# Patient Record
Sex: MALE | Race: WHITE | Employment: FULL TIME | URBAN - METROPOLITAN AREA
[De-identification: names, ages, dates, MRNs, and addresses within clinical notes are randomized per-mention and may not be internally consistent; named-entity substitution may affect disease eponyms.]

---

## 2017-07-28 ENCOUNTER — DOCUMENTATION ONLY (OUTPATIENT)
Dept: LAB | Facility: CLINIC | Age: 47
End: 2017-07-28

## 2017-07-28 DIAGNOSIS — I10 HYPERTENSION GOAL BP (BLOOD PRESSURE) < 140/90: ICD-10-CM

## 2017-07-28 DIAGNOSIS — E78.5 HYPERLIPIDEMIA LDL GOAL <130: Primary | ICD-10-CM

## 2017-07-28 NOTE — PROGRESS NOTES
Need previsit labs-see orders and close encounter if nothing else needed./Claudia Ugarte,       Lab results 8/4/17

## 2017-07-28 NOTE — PROGRESS NOTES
Please review, associate diagnosis and sign pending laboratory future orders for patient  upcoming lab appointment on 08/01/17.  Thank you,   Nancy Santos MLT

## 2017-08-01 ENCOUNTER — DOCUMENTATION ONLY (OUTPATIENT)
Dept: LAB | Facility: CLINIC | Age: 47
End: 2017-08-01

## 2017-08-01 DIAGNOSIS — I10 HYPERTENSION GOAL BP (BLOOD PRESSURE) < 140/90: ICD-10-CM

## 2017-08-01 DIAGNOSIS — E78.5 HYPERLIPIDEMIA LDL GOAL <130: ICD-10-CM

## 2017-08-01 DIAGNOSIS — R73.9 HYPERGLYCEMIA: Primary | ICD-10-CM

## 2017-08-01 DIAGNOSIS — R73.9 HYPERGLYCEMIA: ICD-10-CM

## 2017-08-01 LAB
ANION GAP SERPL CALCULATED.3IONS-SCNC: 10 MMOL/L (ref 3–14)
BUN SERPL-MCNC: 18 MG/DL (ref 7–30)
CALCIUM SERPL-MCNC: 9.1 MG/DL (ref 8.5–10.1)
CHLORIDE SERPL-SCNC: 103 MMOL/L (ref 94–109)
CHOLEST SERPL-MCNC: 168 MG/DL
CO2 SERPL-SCNC: 25 MMOL/L (ref 20–32)
CREAT SERPL-MCNC: 0.99 MG/DL (ref 0.66–1.25)
GFR SERPL CREATININE-BSD FRML MDRD: 81 ML/MIN/1.7M2
GLUCOSE SERPL-MCNC: 118 MG/DL (ref 70–99)
HBA1C MFR BLD: 6.4 % (ref 4.3–6)
HDLC SERPL-MCNC: 29 MG/DL
LDLC SERPL CALC-MCNC: 75 MG/DL
NONHDLC SERPL-MCNC: 139 MG/DL
POTASSIUM SERPL-SCNC: 4.2 MMOL/L (ref 3.4–5.3)
SODIUM SERPL-SCNC: 138 MMOL/L (ref 133–144)
TRIGL SERPL-MCNC: 319 MG/DL

## 2017-08-01 PROCEDURE — 83036 HEMOGLOBIN GLYCOSYLATED A1C: CPT | Performed by: FAMILY MEDICINE

## 2017-08-01 PROCEDURE — 80048 BASIC METABOLIC PNL TOTAL CA: CPT | Performed by: FAMILY MEDICINE

## 2017-08-01 PROCEDURE — 36415 COLL VENOUS BLD VENIPUNCTURE: CPT | Performed by: FAMILY MEDICINE

## 2017-08-01 PROCEDURE — 80061 LIPID PANEL: CPT | Performed by: FAMILY MEDICINE

## 2017-08-01 NOTE — PROGRESS NOTES
This patient had his blood drawn today and is requesting a Hgb a1c for his work.  Please review and order laboratory future orders for patient's  lab appointment on 08/01/17.  Thank you,   Nancy Santos MLT

## 2017-08-01 NOTE — PROGRESS NOTES
Please review lab orders sign and close encounter. Cynthia Valdes MA/GREER    Patient is requesting A1C for his work

## 2017-08-03 DIAGNOSIS — I10 ESSENTIAL HYPERTENSION WITH GOAL BLOOD PRESSURE LESS THAN 140/90: ICD-10-CM

## 2017-08-03 DIAGNOSIS — E78.5 HYPERLIPIDEMIA LDL GOAL <130: ICD-10-CM

## 2017-08-03 RX ORDER — AMLODIPINE BESYLATE 10 MG/1
TABLET ORAL
Qty: 90 TABLET | Refills: 0 | Status: SHIPPED | OUTPATIENT
Start: 2017-08-03 | End: 2017-08-04

## 2017-08-03 RX ORDER — SIMVASTATIN 20 MG
TABLET ORAL
Qty: 90 TABLET | Refills: 0 | Status: SHIPPED | OUTPATIENT
Start: 2017-08-03 | End: 2017-08-04

## 2017-08-04 ENCOUNTER — OFFICE VISIT (OUTPATIENT)
Dept: FAMILY MEDICINE | Facility: CLINIC | Age: 47
End: 2017-08-04
Payer: COMMERCIAL

## 2017-08-04 VITALS
WEIGHT: 224 LBS | HEIGHT: 76 IN | DIASTOLIC BLOOD PRESSURE: 78 MMHG | BODY MASS INDEX: 27.28 KG/M2 | HEART RATE: 61 BPM | TEMPERATURE: 97.3 F | SYSTOLIC BLOOD PRESSURE: 138 MMHG | OXYGEN SATURATION: 97 %

## 2017-08-04 DIAGNOSIS — I10 HYPERTENSION GOAL BP (BLOOD PRESSURE) < 140/90: Primary | ICD-10-CM

## 2017-08-04 DIAGNOSIS — E78.5 DYSLIPIDEMIA: ICD-10-CM

## 2017-08-04 PROCEDURE — 99214 OFFICE O/P EST MOD 30 MIN: CPT | Performed by: FAMILY MEDICINE

## 2017-08-04 RX ORDER — SIMVASTATIN 40 MG
40 TABLET ORAL AT BEDTIME
Qty: 90 TABLET | Refills: 3 | Status: SHIPPED | OUTPATIENT
Start: 2017-08-04 | End: 2017-08-10 | Stop reason: DRUGHIGH

## 2017-08-04 RX ORDER — AMLODIPINE BESYLATE 10 MG/1
10 TABLET ORAL DAILY
Qty: 90 TABLET | Refills: 3 | Status: SHIPPED | OUTPATIENT
Start: 2017-08-04 | End: 2018-08-21

## 2017-08-04 NOTE — PROGRESS NOTES
HPI:    I am seeing Teddy Shah for the 1st time. he is a 47 year old male here to discuss:    Work biographic form - I filled this out and gave to him.    HTN - not checking at home. Fairly controlled in clinic.  Evaluation and treatment:   Norvasc 10 mg qd - no side effects   Continue same treatment and advised diet, exercise.    Last Basic Metabolic Panel:  Lab Results   Component Value Date     08/01/2017      Lab Results   Component Value Date    POTASSIUM 4.2 08/01/2017     Lab Results   Component Value Date    CHLORIDE 103 08/01/2017     Lab Results   Component Value Date    ZENOBIA 9.1 08/01/2017     Lab Results   Component Value Date    CO2 25 08/01/2017     Lab Results   Component Value Date    BUN 18 08/01/2017     Lab Results   Component Value Date    CR 0.99 08/01/2017     Lab Results   Component Value Date     08/01/2017     Lab Results   Component Value Date    A1C 6.4 08/01/2017    A1C 6.2 07/22/2016    A1C 6.3 05/13/2016    A1C 5.5 02/11/2013       Dyslipidemia - No history of CAD, CVA, PAD or diabetes. Per ATP4, statin may or may not be recommended since I don't have the 10 year CV risk off of his statin. But he would like to protect himself given FH of MI.    Evaluation and treatment:   Zocor 20 mg qd - some mucle cramps - discussed option to switch to another statin but he wants to wait.    Fish Oil 1 gm qd - has not been taking lately   Co Q 10 qd - not taking lately    The 10-year ASCVD risk score ([place] TAVO Jr, et al., 2013) is: 5.3%    Values used to calculate the score:      Age: 47 years      Sex: Male      Is Non- : No      Diabetic: No      Tobacco smoker: No      Systolic Blood Pressure: 138 mmHg      Is BP treated: Yes      HDL Cholesterol: 29 mg/dL      Total Cholesterol: 168 mg/dL       Recent Labs   Lab Test  08/01/17   0955  07/22/16   0830  05/12/15  03/12/15   0820   CHOL  168  164   < >  181  147   HDL  29*  30*   < >  35  31*   LDL  75  83   < >  " 94  58   TRIG  319*  253*   < >  260  288*   CHOLHDLRATIO   --    --    --   5.2  4.7    < > = values in this interval not displayed.       GERD -  Evaluation and treatment:   Prilosec 40 mg qd - not needing it lately.    Overweight, pre-diabetes - diet and exercise discussed.    Body mass index is 27.27 kg/(m^2).     Wt Readings from Last 5 Encounters:   08/04/17 224 lb (101.6 kg)   07/28/16 224 lb (101.6 kg)   05/23/16 220 lb (99.8 kg)   03/12/15 237 lb (107.5 kg)   12/23/14 230 lb (104.3 kg)       Lab Results   Component Value Date     08/01/2017     Lab Results   Component Value Date    A1C 6.4 08/01/2017    A1C 6.2 07/22/2016    A1C 6.3 05/13/2016    A1C 5.5 02/11/2013       Preventive -     Immunization History   Administered Date(s) Administered     Influenza Intranasal Vaccine 4 valent 12/12/2014     TDAP Vaccine (Adacel) 06/30/2010       ROS:    Const: No fevers, weight changes or night sweats recently.  ENT: No runny nose, sore throat or ear pain.  Resp: No cough or shortness of breath.  CV: No chest pain, dizziness or cardiac palpitations.  GI: No nausea, vomiting, diarrhea or constipation. Denies blood in stools or black stools.  : No dysuria, frequency or hematuria.  The rest of the ROS negative, other than listed on HPI    SH:    Marital status:   Kids: 3  Employment: sales  Exercise: hockey once per week, motorcycling  Tobacco: no  Etoh: occ  Recreational drugs: no  Caffeine: monster daily    FH:    Dad passed away age 47 due to MI    Exam:    /78  Pulse 61  Temp 97.3  F (36.3  C) (Oral)  Ht 6' 4\" (1.93 m)  Wt 224 lb (101.6 kg)  SpO2 97%  BMI 27.27 kg/m2    Gen: Healthy appearing male in no acute distress  Eyes: Conjunctiva and sclera normal. Pupils react normally to light. No nystagmus.  Neck: No enlarged lymph nodes, thyromegally or other masses.  Lungs: Good air movement and otherwise clear.  CV: Heart RRR with no murmurs. No JVD, carotid bruits or leg " edema.      Assessment and Plan - Decision Making    1. Hypertension goal BP (blood pressure) < 140/90  Per HPI  - amLODIPine (NORVASC) 10 MG tablet; Take 1 tablet (10 mg) by mouth daily  Dispense: 90 tablet; Refill: 3    2. Dyslipidemia  Per HPI  - simvastatin (ZOCOR) 40 MG tablet; Take 1 tablet (40 mg) by mouth At Bedtime  Dispense: 90 tablet; Refill: 3      Written instructions given as follows:    Patient Instructions   1. Let's shoot for waist circ of 37 inches.    2. I recommend including veggies, fresh fruits (3 to 5 servings), nuts (unsalted) in your daily diet. Cut down on carbs like bread, pasta, rice, potatoes. Cut down on red meats like burgers etc. Increase healthy proteins like beans, tofu, tuna, chicken and turkey.    3. Get regular exercise like jogging, biking, swimming at least 3 times per week. Also do some weights.    4. Come back in 6 months fasting. If you can't, at least make a lab only appointment.

## 2017-08-04 NOTE — MR AVS SNAPSHOT
After Visit Summary   8/4/2017    Teddy Shah    MRN: [id number]           Patient Information     Date Of Birth          1970        Visit Information        Provider Department      8/4/2017 8:10 AM Levi Oliver MD [facility]        Today's Diagnoses     Hypertension goal BP (blood pressure) < 140/90    -  1    Dyslipidemia          Care Instructions    1. Let's shoot for waist circ of 37 inches.    2. I recommend including veggies, fresh fruits (3 to 5 servings), nuts (unsalted) in your daily diet. Cut down on carbs like bread, pasta, rice, potatoes. Cut down on red meats like burgers etc. Increase healthy proteins like beans, tofu, tuna, chicken and turkey.    3. Get regular exercise like jogging, biking, swimming at least 3 times per week. Also do some weights.    4. Come back in 6 months fasting. If you can't, at least make a lab only appointment.                      Follow-ups after your visit        Who to contact     If you have questions or need follow up information about today's clinic visit or your schedule please contact [facility] directly at [phone].  Normal or non-critical lab and imaging results will be communicated to you by Scondoohart, letter or phone within 4 business days after the clinic has received the results. If you do not hear from us within 7 days, please contact the clinic through Scondoohart or phone. If you have a critical or abnormal lab result, we will notify you by phone as soon as possible.  Submit refill requests through [employer] or call your pharmacy and they will forward the refill request to us. Please allow 3 business days for your refill to be completed.          Additional Information About Your Visit        MyChart Information     [employer] gives you secure access to your electronic health record. If you see a primary care provider, you can also send messages to your care team and make appointments. If you have questions,  "please call your primary care clinic.  If you do not have a primary care provider, please call [phone] and they will assist you.        Care EveryWhere ID     This is your Care EveryWhere ID. This could be used by other organizations to access your [place] medical records  [id number]        Your Vitals Were     Pulse Temperature Height Pulse Oximetry BMI (Body Mass Index)       61 97.3  F (36.3  C) (Oral) 6' 4\" (1.93 m) 97% 27.27 kg/m2        Blood Pressure from Last 3 Encounters:   08/04/17 138/78   07/28/16 124/66   05/26/16 113/65    Weight from Last 3 Encounters:   08/04/17 224 lb (101.6 kg)   07/28/16 224 lb (101.6 kg)   05/23/16 220 lb (99.8 kg)              Today, you had the following     No orders found for display         Today's Medication Changes          These changes are accurate as of: 8/4/17  8:43 AM.  If you have any questions, ask your nurse or doctor.               Start taking these medicines.        Dose/Directions    amLODIPine 10 MG tablet   Commonly known as:  NORVASC   Used for:  Hypertension goal BP (blood pressure) < 140/90   Started by:  Levi Oliver MD        Dose:  10 mg   Take 1 tablet (10 mg) by mouth daily   Quantity:  90 tablet   Refills:  3         These medicines have changed or have updated prescriptions.        Dose/Directions    simvastatin 40 MG tablet   Commonly known as:  ZOCOR   This may have changed:  See the new instructions.   Used for:  Dyslipidemia   Changed by:  Levi Oliver MD        Dose:  40 mg   Take 1 tablet (40 mg) by mouth At Bedtime   Quantity:  90 tablet   Refills:  3            Where to get your medicines      These medications were sent to [facility] - [facility], [address]  [address] [facility] [address]     Phone:  [phone]     amLODIPine 10 MG tablet    simvastatin 40 MG tablet                Primary Care Provider Office Phone # Fax #    Hasmukh Vick [phone]       " [facility] [address] [facility] [address]        Equal Access to Services     OLGA COLEMAN : Hadii aad ku hadamiralayo Soclaudiaali, waaxda luqadaha, qaybta kaalmada yarelicristianokevin, adry willson renebasil salinasjadeheather bonds. So [facility] [phone].    ATENCIÓN: Si habla español, tiene a booth disposición servicios gratuitos de asistencia lingüística. Llame al [phone].    We comply with applicable federal civil rights laws and [place] laws. We do not discriminate on the basis of race, color, national origin, age, disability sex, sexual orientation or gender identity.            Thank you!     Thank you for choosing [facility]  for your care. Our goal is always to provide you with excellent care. Hearing back from our patients is one way we can continue to improve our services. Please take a few minutes to complete the written survey that you may receive in the mail after your visit with us. Thank you!             Your Updated Medication List - Protect others around you: Learn how to safely use, store and throw away your medicines at www.disposemymeds.org.          This list is accurate as of: 8/4/17  8:43 AM.  Always use your most recent med list.                   Brand Name Dispense Instructions for use Diagnosis    amLODIPine 10 MG tablet    NORVASC    90 tablet    Take 1 tablet (10 mg) by mouth daily    Hypertension goal BP (blood pressure) < 140/90       COQ-10 PO           MAGNESIUM OXIDE PO      Take 200 mg by mouth daily        OMEGA-3 FISH OIL PO      Take 1 g by mouth daily        omeprazole 40 MG capsule    priLOSEC    90 capsule    Take 1 capsule (40 mg) by mouth daily Take 30-60 minutes before a meal. x14 days and than as needed for heartburn    GERD (gastroesophageal reflux disease)       simvastatin 40 MG tablet    ZOCOR    90 tablet    Take 1 tablet (40 mg) by mouth At Bedtime    Dyslipidemia       VITAMIN D (CHOLECALCIFEROL) PO      Take 1,000 Units by mouth daily

## 2017-08-04 NOTE — NURSING NOTE
"Chief Complaint   Patient presents with     Hypertension     Results     labs 8-1-2017       Initial /78 (Cuff Size: Adult Regular)  Pulse 61  Temp 97.3  F (36.3  C) (Oral)  Ht 6' 4\" (1.93 m)  Wt 224 lb (101.6 kg)  SpO2 97%  BMI 27.27 kg/m2 Estimated body mass index is 27.27 kg/(m^2) as calculated from the following:    Height as of this encounter: 6' 4\" (1.93 m).    Weight as of this encounter: 224 lb (101.6 kg).  Medication Reconciliation: complete    Maribel Cardozo LPN    "

## 2017-08-04 NOTE — PATIENT INSTRUCTIONS
1. Let's shoot for waist circ of 37 inches.    2. I recommend including veggies, fresh fruits (3 to 5 servings), nuts (unsalted) in your daily diet. Cut down on carbs like bread, pasta, rice, potatoes. Cut down on red meats like burgers etc. Increase healthy proteins like beans, tofu, tuna, chicken and turkey.    3. Get regular exercise like jogging, biking, swimming at least 3 times per week. Also do some weights.    4. Come back in 6 months fasting. If you can't, at least make a lab only appointment.

## 2017-08-10 ENCOUNTER — TELEPHONE (OUTPATIENT)
Dept: FAMILY MEDICINE | Facility: CLINIC | Age: 47
End: 2017-08-10

## 2017-08-10 DIAGNOSIS — E78.5 HYPERLIPIDEMIA LDL GOAL <130: ICD-10-CM

## 2017-08-10 DIAGNOSIS — E78.5 DYSLIPIDEMIA: ICD-10-CM

## 2017-08-10 RX ORDER — SIMVASTATIN 20 MG
20 TABLET ORAL AT BEDTIME
Qty: 90 TABLET | Refills: 3 | Status: SHIPPED | OUTPATIENT
Start: 2017-08-10 | End: 2018-08-21

## 2017-08-10 NOTE — TELEPHONE ENCOUNTER
[facility] calling regarding medication order for Simvastatin 40mg. Patient is already on Amlodipine. When taking Amlodipine, Simvastatin should not exceed 20mg daily. Pharmacist said they have sent this same notice a few times already by fax, so if they don't hear back in 24 hours they may need to cancel the order. Please call to advise. Thank you.

## 2018-01-09 ENCOUNTER — ALLIED HEALTH/NURSE VISIT (OUTPATIENT)
Dept: NURSING | Facility: CLINIC | Age: 48
End: 2018-01-09
Payer: COMMERCIAL

## 2018-01-09 DIAGNOSIS — Z23 NEED FOR PROPHYLACTIC VACCINATION AND INOCULATION AGAINST INFLUENZA: Primary | ICD-10-CM

## 2018-01-09 PROCEDURE — 90471 IMMUNIZATION ADMIN: CPT

## 2018-01-09 PROCEDURE — 99207 ZZC NO CHARGE NURSE ONLY: CPT

## 2018-01-09 PROCEDURE — 90686 IIV4 VACC NO PRSV 0.5 ML IM: CPT

## 2018-01-09 NOTE — MR AVS SNAPSHOT
After Visit Summary   1/9/2018    Teddy Shah    MRN: [id number]           Patient Information     Date Of Birth          1970        Visit Information        Provider Department      1/9/2018 3:30 PM AN ANCILLARY [facility]        Today's Diagnoses     Need for prophylactic vaccination and inoculation against influenza    -  1       Follow-ups after your visit        Who to contact     If you have questions or need follow up information about today's clinic visit or your schedule please contact [facility] directly at [phone].  Normal or non-critical lab and imaging results will be communicated to you by Naymithart, letter or phone within 4 business days after the clinic has received the results. If you do not hear from us within 7 days, please contact the clinic through Goingt or phone. If you have a critical or abnormal lab result, we will notify you by phone as soon as possible.  Submit refill requests through [employer] or call your pharmacy and they will forward the refill request to us. Please allow 3 business days for your refill to be completed.          Additional Information About Your Visit        MyChart Information     [employer] gives you secure access to your electronic health record. If you see a primary care provider, you can also send messages to your care team and make appointments. If you have questions, please call your primary care clinic.  If you do not have a primary care provider, please call [phone] and they will assist you.        Care EveryWhere ID     This is your Care EveryWhere ID. This could be used by other organizations to access your [place] medical records  [id number]         Blood Pressure from Last 3 Encounters:   08/04/17 138/78   07/28/16 124/66   05/26/16 113/65    Weight from Last 3 Encounters:   08/04/17 224 lb (101.6 kg)   07/28/16 224 lb (101.6 kg)   05/23/16 220 lb (99.8 kg)              We Performed the Following      FLU VAC, SPLIT VIRUS IM > 3 YO (QUADRIVALENT) [92604]     Vaccine Administration, Initial [52133]        Primary Care Provider Office Phone # Fax #    Hasmukh Vick [phone] [address] [facility] [address]        Equal Access to Services     OLGA COLEMAN : Hadii aad ku hadasho Soomaali, waaxda luqadaha, qaybta kaalmada adeegyada, waxay annain renen yareli salinasjadeheather bonds. So [facility] [phone].    ATENCIÓN: Si habla español, tiene a booth disposición servicios gratuitos de asistencia lingüística. [facility] [phone].    We comply with applicable federal civil rights laws and [place] laws. We do not discriminate on the basis of race, color, national origin, age, disability, sex, sexual orientation, or gender identity.            Thank you!     Thank you for choosing [facility]  for your care. Our goal is always to provide you with excellent care. Hearing back from our patients is one way we can continue to improve our services. Please take a few minutes to complete the written survey that you may receive in the mail after your visit with us. Thank you!             Your Updated Medication List - Protect others around you: Learn how to safely use, store and throw away your medicines at www.disposemymeds.org.          This list is accurate as of: 1/9/18  3:45 PM.  Always use your most recent med list.                   Brand Name Dispense Instructions for use Diagnosis    amLODIPine 10 MG tablet    NORVASC    90 tablet    Take 1 tablet (10 mg) by mouth daily    Hypertension goal BP (blood pressure) < 140/90       COQ-10 PO           MAGNESIUM OXIDE PO      Take 200 mg by mouth daily        OMEGA-3 FISH OIL PO      Take 1 g by mouth daily        omeprazole 40 MG capsule    priLOSEC    90 capsule    Take 1 capsule (40 mg) by mouth daily Take 30-60 minutes before a meal. x14 days and than as needed for heartburn    GERD (gastroesophageal reflux disease)        simvastatin 20 MG tablet    ZOCOR    90 tablet    Take 1 tablet (20 mg) by mouth At Bedtime    Hyperlipidemia LDL goal <130       VITAMIN D (CHOLECALCIFEROL) PO      Take 1,000 Units by mouth daily

## 2018-01-09 NOTE — PROGRESS NOTES

## 2018-02-11 ENCOUNTER — MEDICAL CORRESPONDENCE (OUTPATIENT)
Dept: HEALTH INFORMATION MANAGEMENT | Facility: CLINIC | Age: 48
End: 2018-02-11

## 2018-02-15 ENCOUNTER — TELEPHONE (OUTPATIENT)
Dept: FAMILY MEDICINE | Facility: CLINIC | Age: 48
End: 2018-02-15

## 2018-02-15 NOTE — TELEPHONE ENCOUNTER
Reason for Call:  Form, our goal is to have forms completed with 72 hours, however, some forms may require a visit or additional information.    Type of letter, form or note:  Letter of Medical necessity    Who is the form from?: Patient    Where did the form come from: Patient or family brought in       What clinic location was the form placed at?: [place]    Where the form was placed: 's Box    What number is listed as a contact on the form?: [phone]       Additional comments: Please Fax to [phone]    Call taken on 2/15/2018 at 1:42 PM by Katelyn Fernandez

## 2018-02-16 NOTE — TELEPHONE ENCOUNTER
Dr Vick filled out form. Faxed to [employer]@ [phone]. Cynthia Valdes MA/GREER

## 2018-04-01 ENCOUNTER — TRANSFERRED RECORDS (OUTPATIENT)
Dept: HEALTH INFORMATION MANAGEMENT | Facility: CLINIC | Age: 48
End: 2018-04-01

## 2018-04-24 ENCOUNTER — MYC MEDICAL ADVICE (OUTPATIENT)
Dept: FAMILY MEDICINE | Facility: CLINIC | Age: 48
End: 2018-04-24

## 2018-04-24 DIAGNOSIS — J30.2 SEASONAL ALLERGIC RHINITIS, UNSPECIFIED CHRONICITY, UNSPECIFIED TRIGGER: Primary | ICD-10-CM

## 2018-04-24 RX ORDER — FEXOFENADINE HCL 180 MG/1
180 TABLET ORAL DAILY
Qty: 90 TABLET | Refills: 0 | Status: SHIPPED | OUTPATIENT
Start: 2018-04-24 | End: 2018-08-21

## 2018-04-24 NOTE — TELEPHONE ENCOUNTER
Please call patient - overdue for md appointment/labs. Please help set-up in next month. Prescription allegra to x-press prescriptions. Hasmukh Vick MD

## 2018-04-25 ENCOUNTER — DOCUMENTATION ONLY (OUTPATIENT)
Dept: LAB | Facility: CLINIC | Age: 48
End: 2018-04-25

## 2018-04-25 DIAGNOSIS — E78.5 HYPERLIPIDEMIA LDL GOAL <130: Primary | ICD-10-CM

## 2018-04-25 DIAGNOSIS — I10 HYPERTENSION GOAL BP (BLOOD PRESSURE) < 140/90: ICD-10-CM

## 2018-04-25 DIAGNOSIS — R73.9 HYPERGLYCEMIA: ICD-10-CM

## 2018-04-25 DIAGNOSIS — Z12.5 SPECIAL SCREENING FOR MALIGNANT NEOPLASM OF PROSTATE: ICD-10-CM

## 2018-04-25 NOTE — PROGRESS NOTES
Please review, associate diagnosis and sign pending laboratory future orders for patient's  upcoming lab appointment on 04/27/18.    Thank you,   Nancy Santos MLT

## 2018-04-27 DIAGNOSIS — I10 HYPERTENSION GOAL BP (BLOOD PRESSURE) < 140/90: ICD-10-CM

## 2018-04-27 DIAGNOSIS — Z12.5 SPECIAL SCREENING FOR MALIGNANT NEOPLASM OF PROSTATE: ICD-10-CM

## 2018-04-27 DIAGNOSIS — E78.5 HYPERLIPIDEMIA LDL GOAL <130: ICD-10-CM

## 2018-04-27 DIAGNOSIS — R73.9 HYPERGLYCEMIA: ICD-10-CM

## 2018-04-27 LAB
ALBUMIN SERPL-MCNC: 4.3 G/DL (ref 3.4–5)
ANION GAP SERPL CALCULATED.3IONS-SCNC: 5 MMOL/L (ref 3–14)
BUN SERPL-MCNC: 14 MG/DL (ref 7–30)
CALCIUM SERPL-MCNC: 9.2 MG/DL (ref 8.5–10.1)
CHLORIDE SERPL-SCNC: 104 MMOL/L (ref 94–109)
CHOLEST SERPL-MCNC: 152 MG/DL
CO2 SERPL-SCNC: 29 MMOL/L (ref 20–32)
CREAT SERPL-MCNC: 0.94 MG/DL (ref 0.66–1.25)
GFR SERPL CREATININE-BSD FRML MDRD: 85 ML/MIN/1.7M2
GLUCOSE SERPL-MCNC: 131 MG/DL (ref 70–99)
HBA1C MFR BLD: 6.6 % (ref 0–5.6)
HDLC SERPL-MCNC: 31 MG/DL
LDLC SERPL CALC-MCNC: 57 MG/DL
NONHDLC SERPL-MCNC: 121 MG/DL
PHOSPHATE SERPL-MCNC: 2.4 MG/DL (ref 2.5–4.5)
POTASSIUM SERPL-SCNC: 4.6 MMOL/L (ref 3.4–5.3)
PSA SERPL-ACNC: 1.75 UG/L (ref 0–4)
SODIUM SERPL-SCNC: 138 MMOL/L (ref 133–144)
TRIGL SERPL-MCNC: 322 MG/DL

## 2018-04-27 PROCEDURE — G0103 PSA SCREENING: HCPCS | Performed by: FAMILY MEDICINE

## 2018-04-27 PROCEDURE — 80069 RENAL FUNCTION PANEL: CPT | Performed by: FAMILY MEDICINE

## 2018-04-27 PROCEDURE — 83036 HEMOGLOBIN GLYCOSYLATED A1C: CPT | Performed by: FAMILY MEDICINE

## 2018-04-27 PROCEDURE — 80061 LIPID PANEL: CPT | Performed by: FAMILY MEDICINE

## 2018-04-27 PROCEDURE — 36415 COLL VENOUS BLD VENIPUNCTURE: CPT | Performed by: FAMILY MEDICINE

## 2018-04-30 ENCOUNTER — OFFICE VISIT (OUTPATIENT)
Dept: FAMILY MEDICINE | Facility: CLINIC | Age: 48
End: 2018-04-30
Payer: COMMERCIAL

## 2018-04-30 VITALS
HEART RATE: 67 BPM | BODY MASS INDEX: 27.4 KG/M2 | RESPIRATION RATE: 16 BRPM | OXYGEN SATURATION: 100 % | DIASTOLIC BLOOD PRESSURE: 86 MMHG | SYSTOLIC BLOOD PRESSURE: 138 MMHG | WEIGHT: 225 LBS | TEMPERATURE: 96.6 F | HEIGHT: 76 IN

## 2018-04-30 DIAGNOSIS — I10 HYPERTENSION GOAL BP (BLOOD PRESSURE) < 140/90: ICD-10-CM

## 2018-04-30 DIAGNOSIS — R73.9 HYPERGLYCEMIA: Primary | ICD-10-CM

## 2018-04-30 DIAGNOSIS — E78.5 HYPERLIPIDEMIA LDL GOAL <130: ICD-10-CM

## 2018-04-30 PROCEDURE — 99214 OFFICE O/P EST MOD 30 MIN: CPT | Performed by: FAMILY MEDICINE

## 2018-04-30 RX ORDER — METFORMIN HCL 500 MG
500 TABLET, EXTENDED RELEASE 24 HR ORAL
Qty: 30 TABLET | Refills: 5 | Status: SHIPPED | OUTPATIENT
Start: 2018-04-30 | End: 2018-07-03

## 2018-04-30 NOTE — PROGRESS NOTES
SUBJECTIVE:  Teddy Shah, a 47 year old male scheduled an appointment to discuss the following issues:  Follow-up htn, high cholesterol and hyperglycemia.   No metformin. Exercise more. No family history dm. Diet pop.   Limiting bread. Hockey/biking. No chest pain or shortness of breath. No nausea, vomiting or diarrhea or black or bloody stools.   No outside blood pressure reading. No urine changes or std concerns.   Emotionally doing.   Will travel more for job. 19 yo son doing ok.    but marriage ok.     Past Medical History:   Diagnosis Date     Arthritis      Coronary artery disease     Father  of heart attack at age 47     HTN (hypertension) 2010     Hypercholesteremia        Past Surgical History:   Procedure Laterality Date     C SHOULDER SURG PROC UNLISTED       COLONOSCOPY WITH CO2 INSUFFLATION N/A 2016    Procedure: COLONOSCOPY WITH CO2 INSUFFLATION;  Surgeon: Gerber Fitzpatrick MD;  Location: [facility] OR     ESOPHAGOSCOPY, GASTROSCOPY, DUODENOSCOPY (EGD), COMBINED  7/15/2014    Procedure: COMBINED ESOPHAGOSCOPY, GASTROSCOPY, DUODENOSCOPY (EGD), BIOPSY SINGLE OR MULTIPLE;  Surgeon: Teddy Grimes MD;  Location: [facility] OR     HC OPEN TREATMENT PROX HUMERAL FRACTURE  1/4/10     LASIK  2008    Epi-LASIK due to thin cornea       Family History   Problem Relation Age of Onset     HEART DISEASE Father      mi at 39yo.     Hypertension Father      Hypertension Sister      CANCER Other      colon cancer - paternal side 3 cousins and uncle     DIABETES No family hx of      CEREBROVASCULAR DISEASE No family hx of      Thyroid Disease No family hx of      Glaucoma No family hx of      Macular Degeneration No family hx of        Social History   Substance Use Topics     Smoking status: Never Smoker     Smokeless tobacco: Never Used      Comment: Lives in smoke free household     Alcohol use Yes      Comment: occasional       ROS:  All other ROS negative    OBJECTIVE:  /86  Pulse  "67  Temp 96.6  F (35.9  C) (Oral)  Resp 16  Ht 6' 4\" (1.93 m)  Wt 225 lb (102.1 kg)  SpO2 100%  BMI 27.39 kg/m2  EXAM:  GENERAL APPEARANCE: healthy, alert and no distress  EYES: EOMI,  PERRL  HENT: ear canals and TM's normal and nose and mouth without ulcers or lesions  NECK: no adenopathy, no asymmetry, masses, or scars and thyroid normal to palpation  RESP: lungs clear to auscultation - no rales, rhonchi or wheezes  CV: regular rates and rhythm, normal S1 S2, no S3 or S4 and no murmur, click or rub -  ABDOMEN:  soft, nontender, no HSM or masses and bowel sounds normal  MS: extremities normal- no gross deformities noted, no evidence of inflammation in joints, FROM in all extremities.  PSYCH: mentation appears normal and affect normal/bright    ASSESSMENT / PLAN:  (R73.9) Hyperglycemia  (primary encounter diagnosis)  Comment: early dm  Plan: metFORMIN (GLUCOPHAGE-XR) 500 MG 24 hr tablet        Reveiwed risks and side effects of medication  Continue exercise and work on diet. Recheck in 6 months  Formal dm ed/diatitian if worse. Expected course and warning signs reviewed. Call/email with questions/concerns.     (I10) Hypertension goal BP (blood pressure) < 140/90  Comment: stable  Plan: continue meds. likley add lisinopril in future. Chest pain or shortness of breath to er    (E78.5) Hyperlipidemia LDL goal <130  Comment: stable  Plan: Recheck in 6 months continue irineo Vick    "

## 2018-04-30 NOTE — PROGRESS NOTES
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 He did hepatitis-he is not sure which one.

## 2018-04-30 NOTE — MR AVS SNAPSHOT
"              After Visit Summary   4/30/2018    Teddy Shah    MRN: [id number]           Patient Information     Date Of Birth          1970        Visit Information        Provider Department      4/30/2018 9:15 AM Hasmukh Vick MD [facility]        Today's Diagnoses     Hyperglycemia    -  1    Hypertension goal BP (blood pressure) < 140/90        Hyperlipidemia LDL goal <130           Follow-ups after your visit        Who to contact     If you have questions or need follow up information about today's clinic visit or your schedule please contact [facility] directly at [phone].  Normal or non-critical lab and imaging results will be communicated to you by Swayhart, letter or phone within 4 business days after the clinic has received the results. If you do not hear from us within 7 days, please contact the clinic through Swayhart or phone. If you have a critical or abnormal lab result, we will notify you by phone as soon as possible.  Submit refill requests through UXPin or call your pharmacy and they will forward the refill request to us. Please allow 3 business days for your refill to be completed.          Additional Information About Your Visit        MyChart Information     UXPin gives you secure access to your electronic health record. If you see a primary care provider, you can also send messages to your care team and make appointments. If you have questions, please call your primary care clinic.  If you do not have a primary care provider, please call [phone] and they will assist you.        Care EveryWhere ID     This is your Care EveryWhere ID. This could be used by other organizations to access your [facility] medical records  [id number]        Your Vitals Were     Pulse Temperature Respirations Height Pulse Oximetry BMI (Body Mass Index)    67 96.6  F (35.9  C) (Oral) 16 6' 4\" (1.93 m) 100% 27.39 kg/m2       Blood Pressure from Last 3 Encounters: "   04/30/18 138/86   08/04/17 138/78   07/28/16 124/66    Weight from Last 3 Encounters:   04/30/18 225 lb (102.1 kg)   08/04/17 224 lb (101.6 kg)   07/28/16 224 lb (101.6 kg)              Today, you had the following     No orders found for display         Today's Medication Changes          These changes are accurate as of 4/30/18  9:39 AM.  If you have any questions, ask your nurse or doctor.               Start taking these medicines.        Dose/Directions    metFORMIN 500 MG 24 hr tablet   Commonly known as:  GLUCOPHAGE-XR   Used for:  Hyperglycemia   Started by:  Hasmukh Vick MD        Dose:  500 mg   Take 1 tablet (500 mg) by mouth daily (with dinner) New for blood sugars   Quantity:  30 tablet   Refills:  5            Where to get your medicines      These medications were sent to [facility] [address], [address]  [address] [address], [facility] [address]     Phone:  [phone]     metFORMIN 500 MG 24 hr tablet                Primary Care Provider Office Phone # Fax #    Hasmukh Vick [phone] [address] [place] [address]        Equal Access to Services     OLGA COLEMAN : Hadii edi mitchell hadasho Soomaali, waaxda luqadaha, qaybta kaalmada adeegyada, adry bonds. So [facility] [phone].    ATENCIÓN: Si habla español, tiene a booth disposición servicios gratuitos de asistencia lingüística. Llame al [phone].    We comply with applicable federal civil rights laws and [place] laws. We do not discriminate on the basis of race, color, national origin, age, disability, sex, sexual orientation, or gender identity.            Thank you!     Thank you for choosing [facility]  for your care. Our goal is always to provide you with excellent care. Hearing back from our patients is one way we can continue to improve our services. Please take a few minutes to complete the written survey that  you may receive in the mail after your visit with us. Thank you!             Your Updated Medication List - Protect others around you: Learn how to safely use, store and throw away your medicines at www.disposemymeds.org.          This list is accurate as of 4/30/18  9:39 AM.  Always use your most recent med list.                   Brand Name Dispense Instructions for use Diagnosis    amLODIPine 10 MG tablet    NORVASC    90 tablet    Take 1 tablet (10 mg) by mouth daily    Hypertension goal BP (blood pressure) < 140/90       COQ-10 PO           fexofenadine 180 MG tablet    ALLEGRA    90 tablet    Take 1 tablet (180 mg) by mouth daily As needed for allergies    Seasonal allergic rhinitis, unspecified chronicity, unspecified trigger       MAGNESIUM OXIDE PO      Take 200 mg by mouth daily        metFORMIN 500 MG 24 hr tablet    GLUCOPHAGE-XR    30 tablet    Take 1 tablet (500 mg) by mouth daily (with dinner) New for blood sugars    Hyperglycemia       OMEGA-3 FISH OIL PO      Take 1 g by mouth daily        simvastatin 20 MG tablet    ZOCOR    90 tablet    Take 1 tablet (20 mg) by mouth At Bedtime    Hyperlipidemia LDL goal <130       VITAMIN D (CHOLECALCIFEROL) PO      Take 1,000 Units by mouth daily

## 2018-04-30 NOTE — NURSING NOTE
"Chief Complaint   Patient presents with     Hypertension     Health Maintenance     Consult     immunizations, he did have hepatiits        Initial /86  Pulse 67  Temp 96.6  F (35.9  C) (Oral)  Resp 16  Ht 6' 4\" (1.93 m)  Wt 225 lb (102.1 kg)  SpO2 100%  BMI 27.39 kg/m2 Estimated body mass index is 27.39 kg/(m^2) as calculated from the following:    Height as of this encounter: 6' 4\" (1.93 m).    Weight as of this encounter: 225 lb (102.1 kg).    Taryn Ortiz CMA      "

## 2018-07-03 DIAGNOSIS — R73.9 HYPERGLYCEMIA: ICD-10-CM

## 2018-07-06 RX ORDER — METFORMIN HCL 500 MG
500 TABLET, EXTENDED RELEASE 24 HR ORAL
Qty: 90 TABLET | Refills: 0 | Status: SHIPPED | OUTPATIENT
Start: 2018-07-06 | End: 2018-10-17

## 2018-08-21 DIAGNOSIS — I10 HYPERTENSION GOAL BP (BLOOD PRESSURE) < 140/90: ICD-10-CM

## 2018-08-21 DIAGNOSIS — E78.5 HYPERLIPIDEMIA LDL GOAL <130: ICD-10-CM

## 2018-08-21 DIAGNOSIS — J30.2 SEASONAL ALLERGIC RHINITIS, UNSPECIFIED CHRONICITY, UNSPECIFIED TRIGGER: ICD-10-CM

## 2018-08-21 RX ORDER — FEXOFENADINE HYDROCHLORIDE 180 MG/1
TABLET, FILM COATED ORAL
Qty: 90 TABLET | Refills: 0 | Status: SHIPPED | OUTPATIENT
Start: 2018-08-21 | End: 2019-05-09

## 2018-08-22 RX ORDER — AMLODIPINE BESYLATE 10 MG/1
TABLET ORAL
Qty: 90 TABLET | Refills: 0 | Status: SHIPPED | OUTPATIENT
Start: 2018-08-22 | End: 2018-10-17

## 2018-08-22 RX ORDER — SIMVASTATIN 20 MG
TABLET ORAL
Qty: 90 TABLET | Refills: 0 | Status: SHIPPED | OUTPATIENT
Start: 2018-08-22 | End: 2018-10-17

## 2018-10-17 DIAGNOSIS — E78.5 HYPERLIPIDEMIA LDL GOAL <130: ICD-10-CM

## 2018-10-17 DIAGNOSIS — R73.9 HYPERGLYCEMIA: ICD-10-CM

## 2018-10-17 DIAGNOSIS — I10 HYPERTENSION GOAL BP (BLOOD PRESSURE) < 140/90: ICD-10-CM

## 2018-10-17 RX ORDER — AMLODIPINE BESYLATE 10 MG/1
TABLET ORAL
Qty: 90 TABLET | Refills: 0 | Status: SHIPPED | OUTPATIENT
Start: 2018-10-17 | End: 2019-02-05

## 2018-10-17 RX ORDER — SIMVASTATIN 20 MG
TABLET ORAL
Qty: 90 TABLET | Refills: 0 | Status: SHIPPED | OUTPATIENT
Start: 2018-10-17 | End: 2019-02-05

## 2018-10-17 RX ORDER — METFORMIN HCL 500 MG
TABLET, EXTENDED RELEASE 24 HR ORAL
Qty: 90 TABLET | Refills: 0 | Status: SHIPPED | OUTPATIENT
Start: 2018-10-17 | End: 2019-02-05

## 2018-10-17 NOTE — TELEPHONE ENCOUNTER
Medication is being filled for 1 time refill only due to:  Patient needs to be seen for fasting lab appointment and appointment with the provider for further refills.  Hyperglycemia, hypertension, and cholesterol.  Taryn DARBYN, RN

## 2019-01-18 ENCOUNTER — TELEPHONE (OUTPATIENT)
Dept: FAMILY MEDICINE | Facility: CLINIC | Age: 49
End: 2019-01-18

## 2019-01-18 NOTE — TELEPHONE ENCOUNTER
[facility] looks like for [employer] life insurance. Placed in your basket.Cynthia Valdes MA/GREER

## 2019-01-28 ENCOUNTER — DOCUMENTATION ONLY (OUTPATIENT)
Dept: LAB | Facility: CLINIC | Age: 49
End: 2019-01-28

## 2019-01-28 ENCOUNTER — TELEPHONE (OUTPATIENT)
Dept: FAMILY MEDICINE | Facility: CLINIC | Age: 49
End: 2019-01-28

## 2019-01-28 DIAGNOSIS — R73.9 HYPERGLYCEMIA: ICD-10-CM

## 2019-01-28 DIAGNOSIS — E78.5 DYSLIPIDEMIA: ICD-10-CM

## 2019-01-28 DIAGNOSIS — I10 HYPERTENSION GOAL BP (BLOOD PRESSURE) < 140/90: Primary | ICD-10-CM

## 2019-01-28 DIAGNOSIS — E78.5 HYPERLIPIDEMIA LDL GOAL <130: ICD-10-CM

## 2019-01-28 NOTE — PROGRESS NOTES
Please review lab orders sign and close encounter. Cynthia Valdes MA/GREER    BP/chol appt 2/5/19

## 2019-01-28 NOTE — PROGRESS NOTES
Patient scheduled to see Lab on 1/30/2019 for Pre-visit labs.  Patient does not qualify per Pre-visit protocol.  Please place future orders, or call and advise patient to cancel Lab appointment.

## 2019-01-30 DIAGNOSIS — R73.9 HYPERGLYCEMIA: ICD-10-CM

## 2019-01-30 DIAGNOSIS — E78.5 HYPERLIPIDEMIA LDL GOAL <130: ICD-10-CM

## 2019-01-30 DIAGNOSIS — I10 HYPERTENSION GOAL BP (BLOOD PRESSURE) < 140/90: ICD-10-CM

## 2019-01-30 DIAGNOSIS — E78.5 DYSLIPIDEMIA: ICD-10-CM

## 2019-01-30 LAB
ALBUMIN SERPL-MCNC: 4.3 G/DL (ref 3.4–5)
ANION GAP SERPL CALCULATED.3IONS-SCNC: 7 MMOL/L (ref 3–14)
BUN SERPL-MCNC: 16 MG/DL (ref 7–30)
CALCIUM SERPL-MCNC: 9.2 MG/DL (ref 8.5–10.1)
CHLORIDE SERPL-SCNC: 104 MMOL/L (ref 94–109)
CHOLEST SERPL-MCNC: 165 MG/DL
CO2 SERPL-SCNC: 30 MMOL/L (ref 20–32)
CREAT SERPL-MCNC: 0.94 MG/DL (ref 0.66–1.25)
GFR SERPL CREATININE-BSD FRML MDRD: >90 ML/MIN/{1.73_M2}
GLUCOSE SERPL-MCNC: 112 MG/DL (ref 70–99)
HBA1C MFR BLD: 6.4 % (ref 0–5.6)
HDLC SERPL-MCNC: 32 MG/DL
LDLC SERPL CALC-MCNC: 86 MG/DL
NONHDLC SERPL-MCNC: 133 MG/DL
PHOSPHATE SERPL-MCNC: 2.6 MG/DL (ref 2.5–4.5)
POTASSIUM SERPL-SCNC: 4 MMOL/L (ref 3.4–5.3)
SODIUM SERPL-SCNC: 141 MMOL/L (ref 133–144)
TRIGL SERPL-MCNC: 234 MG/DL

## 2019-01-30 PROCEDURE — 80069 RENAL FUNCTION PANEL: CPT | Performed by: FAMILY MEDICINE

## 2019-01-30 PROCEDURE — 83036 HEMOGLOBIN GLYCOSYLATED A1C: CPT | Performed by: FAMILY MEDICINE

## 2019-01-30 PROCEDURE — 80061 LIPID PANEL: CPT | Performed by: FAMILY MEDICINE

## 2019-01-30 PROCEDURE — 36415 COLL VENOUS BLD VENIPUNCTURE: CPT | Performed by: FAMILY MEDICINE

## 2019-02-05 ENCOUNTER — OFFICE VISIT (OUTPATIENT)
Dept: FAMILY MEDICINE | Facility: CLINIC | Age: 49
End: 2019-02-05
Payer: COMMERCIAL

## 2019-02-05 ENCOUNTER — TELEPHONE (OUTPATIENT)
Dept: FAMILY MEDICINE | Facility: CLINIC | Age: 49
End: 2019-02-05

## 2019-02-05 VITALS
HEIGHT: 76 IN | SYSTOLIC BLOOD PRESSURE: 137 MMHG | OXYGEN SATURATION: 98 % | TEMPERATURE: 97.8 F | HEART RATE: 71 BPM | BODY MASS INDEX: 27.52 KG/M2 | DIASTOLIC BLOOD PRESSURE: 79 MMHG | WEIGHT: 226 LBS

## 2019-02-05 DIAGNOSIS — E78.5 HYPERLIPIDEMIA LDL GOAL <130: ICD-10-CM

## 2019-02-05 DIAGNOSIS — I10 HYPERTENSION GOAL BP (BLOOD PRESSURE) < 140/90: Primary | ICD-10-CM

## 2019-02-05 DIAGNOSIS — R73.9 HYPERGLYCEMIA: ICD-10-CM

## 2019-02-05 PROCEDURE — 99214 OFFICE O/P EST MOD 30 MIN: CPT | Performed by: FAMILY MEDICINE

## 2019-02-05 RX ORDER — AMLODIPINE BESYLATE 10 MG/1
10 TABLET ORAL DAILY
Qty: 90 TABLET | Refills: 1 | Status: SHIPPED | OUTPATIENT
Start: 2019-02-05 | End: 2019-08-30

## 2019-02-05 RX ORDER — SIMVASTATIN 20 MG
20 TABLET ORAL AT BEDTIME
Qty: 90 TABLET | Refills: 3 | Status: SHIPPED | OUTPATIENT
Start: 2019-02-05 | End: 2019-08-30

## 2019-02-05 RX ORDER — METFORMIN HCL 500 MG
TABLET, EXTENDED RELEASE 24 HR ORAL
Qty: 90 TABLET | Refills: 1 | Status: SHIPPED | OUTPATIENT
Start: 2019-02-05 | End: 2019-08-30

## 2019-02-05 ASSESSMENT — MIFFLIN-ST. JEOR: SCORE: 1991.63

## 2019-02-05 NOTE — PROGRESS NOTES
SUBJECTIVE:  Teddy Shah, a 48 year old male scheduled an appointment to discuss the following issues:  Follow-up htn, high cholesterol and hyperglycemia.  No outside blood pressure reading. No cuff. Exercise - hockey x2/week and treadmill. Some weight lifting.   No chest pain or shortness of breath. No nausea, vomiting or diarrhea or black/bloody stools. Uncle family history colon. Had colonoscopy 3 years.  No urine changes or hematuria.   Family ok 18yo.   Marriage ok - wife in [place].         Past Medical History:   Diagnosis Date     Arthritis      Coronary artery disease     Father  of heart attack at age 47     HTN (hypertension) 2010     Hypercholesteremia        Past Surgical History:   Procedure Laterality Date     C SHOULDER SURG PROC UNLISTED       COLONOSCOPY WITH CO2 INSUFFLATION N/A 2016    Procedure: COLONOSCOPY WITH CO2 INSUFFLATION;  Surgeon: Gerber Fitzpatrick MD;  Location: [facility] OR     ESOPHAGOSCOPY, GASTROSCOPY, DUODENOSCOPY (EGD), COMBINED  7/15/2014    Procedure: COMBINED ESOPHAGOSCOPY, GASTROSCOPY, DUODENOSCOPY (EGD), BIOPSY SINGLE OR MULTIPLE;  Surgeon: Teddy Grimes MD;  Location:  OR      OPEN TREATMENT PROX HUMERAL FRACTURE  1/4/10     LASIK      Epi-LASIK due to thin cornea       Family History   Problem Relation Age of Onset     Heart Disease Father         mi at 39yo.     Hypertension Father      Hypertension Sister      Cancer Other         colon cancer - paternal side 3 cousins and uncle     Diabetes No family hx of      Cerebrovascular Disease No family hx of      Thyroid Disease No family hx of      Glaucoma No family hx of      Macular Degeneration No family hx of        Social History     Tobacco Use     Smoking status: Never Smoker     Smokeless tobacco: Never Used     Tobacco comment: Lives in smoke free household   Substance Use Topics     Alcohol use: Yes     Comment: occasional       ROS:  All other ROS negative    OBJECTIVE:  BP  "137/79   Pulse 71   Temp 97.8  F (36.6  C) (Oral)   Ht 1.93 m (6' 4\")   Wt 102.5 kg (226 lb)   SpO2 98%   BMI 27.51 kg/m    EXAM:  GENERAL APPEARANCE: healthy, alert and no distress  EYES: EOMI,  PERRL  HENT: ear canals and TM's normal and nose and mouth without ulcers or lesions  RESP: lungs clear to auscultation - no rales, rhonchi or wheezes  CV: regular rates and rhythm, normal S1 S2, no S3 or S4 and no murmur, click or rub -  ABDOMEN:  soft, nontender, no HSM or masses and bowel sounds normal  MS: extremities normal- no gross deformities noted, no evidence of inflammation in joints, FROM in all extremities.  PSYCH: mentation appears normal and affect normal/bright    ASSESSMENT / PLAN:  (I10) Hypertension goal BP (blood pressure) < 140/90  (primary encounter diagnosis)  Comment: a little high  Plan: order for DME, amLODIPine (NORVASC) 10 MG         tablet        Self-monitor with home blood pressure cuff. Add lisinopril if worse. Limit sodium and continue exercise. Chest pain or shortness of breath to er. Recheck in 6 months  Sooner if worse    (E78.5) Hyperlipidemia LDL goal <130  Comment: stable  Plan: simvastatin (ZOCOR) 20 MG tablet        Continue diet/exercise    (R73.9) Hyperglycemia  Comment: improving  Plan: metFORMIN (GLUCOPHAGE-XR) 500 MG 24 hr tablet        Continue med/diet/exercise and 5 lbs weight loss. Reveiwed risks and side effects of medication  Follow-up eye exam.     Hasmukh Vick  "

## 2019-02-05 NOTE — TELEPHONE ENCOUNTER
[employer] calling. They faxed over a Hepatitis Questionnaire on 1/28. They are wondering if it was received.

## 2019-02-05 NOTE — TELEPHONE ENCOUNTER
I completed a form this AM about hyperglycemia and gave to patient. Didn't see any other form. Hasmukh Vick MD

## 2019-02-08 NOTE — TELEPHONE ENCOUNTER
Please call. Form says hepatitis. I have no record of patient having hepatitis in past. Was this in [place]. Patient might have fatty liver? But that is not hepatitis. If not had testing for hepatis we could do a lab test for A/BandC. Hasmukh Vick MD

## 2019-05-01 ENCOUNTER — TRANSFERRED RECORDS (OUTPATIENT)
Dept: MULTI SPECIALTY CLINIC | Facility: CLINIC | Age: 49
End: 2019-05-01

## 2019-05-05 ENCOUNTER — MYC MEDICAL ADVICE (OUTPATIENT)
Dept: FAMILY MEDICINE | Facility: CLINIC | Age: 49
End: 2019-05-05

## 2019-05-07 ENCOUNTER — MYC MEDICAL ADVICE (OUTPATIENT)
Dept: FAMILY MEDICINE | Facility: CLINIC | Age: 49
End: 2019-05-07

## 2019-05-09 DIAGNOSIS — J30.2 SEASONAL ALLERGIC RHINITIS: ICD-10-CM

## 2019-05-09 RX ORDER — FEXOFENADINE HYDROCHLORIDE 180 MG/1
TABLET, FILM COATED ORAL
Qty: 90 TABLET | Refills: 0 | Status: SHIPPED | OUTPATIENT
Start: 2019-05-09 | End: 2019-12-02

## 2019-05-09 NOTE — TELEPHONE ENCOUNTER
Patient should work hard on diet/exercise/ blood pressure and weight loss. We can discuss more heart tests at follow-up md appointment in next couple months. Hasmukh Vick MD if having actual chest pain or shortness of breath to er.

## 2019-08-06 ENCOUNTER — MYC MEDICAL ADVICE (OUTPATIENT)
Dept: FAMILY MEDICINE | Facility: CLINIC | Age: 49
End: 2019-08-06

## 2019-08-06 NOTE — TELEPHONE ENCOUNTER
I placed form in your basket to review. Is this something you can do or does he need to come in and see a different provider? If you will do it, I will fill in the spots I can after I know if this is something you will do.Cynthia Valdes MA/TC

## 2019-08-09 ENCOUNTER — TELEPHONE (OUTPATIENT)
Dept: FAMILY MEDICINE | Facility: CLINIC | Age: 49
End: 2019-08-09

## 2019-08-09 NOTE — TELEPHONE ENCOUNTER
Robbie is calling from [employer] to inform provider they only received the front page of form there should be the back side with patient's signature. Please fax the form back to [phone]     If need to speak to Robbie his ext is [phone]    Thank you

## 2019-08-12 NOTE — TELEPHONE ENCOUNTER
Form was emailed in-form was not signed. Left message on Robbie's voicemail that patient did not sign the from prior to emailing it to us.

## 2019-08-19 ENCOUNTER — DOCUMENTATION ONLY (OUTPATIENT)
Dept: LAB | Facility: CLINIC | Age: 49
End: 2019-08-19

## 2019-08-19 DIAGNOSIS — E78.5 HYPERLIPIDEMIA LDL GOAL <130: Primary | ICD-10-CM

## 2019-08-19 DIAGNOSIS — I10 HYPERTENSION GOAL BP (BLOOD PRESSURE) < 140/90: ICD-10-CM

## 2019-08-19 DIAGNOSIS — Z00.00 ROUTINE HISTORY AND PHYSICAL EXAMINATION OF ADULT: ICD-10-CM

## 2019-08-19 DIAGNOSIS — Z12.5 SCREENING FOR PROSTATE CANCER: ICD-10-CM

## 2019-08-19 DIAGNOSIS — E11.9 TYPE 2 DIABETES MELLITUS WITHOUT COMPLICATION, WITHOUT LONG-TERM CURRENT USE OF INSULIN (H): ICD-10-CM

## 2019-08-19 NOTE — PROGRESS NOTES
Pt has a PV-lab appointment on 8-23-19 at 0745 with no orders.  Please review pended order and place any additional future orders if necessary.      Thank you,  Maira Dawson MLT  Lab

## 2019-08-23 DIAGNOSIS — Z12.5 SCREENING FOR PROSTATE CANCER: ICD-10-CM

## 2019-08-23 DIAGNOSIS — E78.5 HYPERLIPIDEMIA LDL GOAL <130: ICD-10-CM

## 2019-08-23 DIAGNOSIS — E11.9 TYPE 2 DIABETES MELLITUS WITHOUT COMPLICATION, WITHOUT LONG-TERM CURRENT USE OF INSULIN (H): ICD-10-CM

## 2019-08-23 DIAGNOSIS — I10 HYPERTENSION GOAL BP (BLOOD PRESSURE) < 140/90: ICD-10-CM

## 2019-08-23 LAB
ALT SERPL W P-5'-P-CCNC: 38 U/L (ref 0–70)
ANION GAP SERPL CALCULATED.3IONS-SCNC: 10 MMOL/L (ref 3–14)
BUN SERPL-MCNC: 12 MG/DL (ref 7–30)
CALCIUM SERPL-MCNC: 9.4 MG/DL (ref 8.5–10.1)
CHLORIDE SERPL-SCNC: 105 MMOL/L (ref 94–109)
CHOLEST SERPL-MCNC: 148 MG/DL
CO2 SERPL-SCNC: 26 MMOL/L (ref 20–32)
CREAT SERPL-MCNC: 1.05 MG/DL (ref 0.66–1.25)
CREAT UR-MCNC: 209 MG/DL
GFR SERPL CREATININE-BSD FRML MDRD: 82 ML/MIN/{1.73_M2}
GLUCOSE SERPL-MCNC: 104 MG/DL (ref 70–99)
HDLC SERPL-MCNC: 36 MG/DL
LDLC SERPL CALC-MCNC: 81 MG/DL
MICROALBUMIN UR-MCNC: 24 MG/L
MICROALBUMIN/CREAT UR: 11.67 MG/G CR (ref 0–17)
NONHDLC SERPL-MCNC: 112 MG/DL
POTASSIUM SERPL-SCNC: 4 MMOL/L (ref 3.4–5.3)
PSA SERPL-ACNC: 2.07 UG/L (ref 0–4)
SODIUM SERPL-SCNC: 141 MMOL/L (ref 133–144)
TRIGL SERPL-MCNC: 153 MG/DL
TSH SERPL DL<=0.005 MIU/L-ACNC: 2.42 MU/L (ref 0.4–4)

## 2019-08-23 PROCEDURE — 36415 COLL VENOUS BLD VENIPUNCTURE: CPT | Performed by: FAMILY MEDICINE

## 2019-08-23 PROCEDURE — 80061 LIPID PANEL: CPT | Performed by: FAMILY MEDICINE

## 2019-08-23 PROCEDURE — 82043 UR ALBUMIN QUANTITATIVE: CPT | Performed by: FAMILY MEDICINE

## 2019-08-23 PROCEDURE — 84443 ASSAY THYROID STIM HORMONE: CPT | Performed by: FAMILY MEDICINE

## 2019-08-23 PROCEDURE — 84460 ALANINE AMINO (ALT) (SGPT): CPT | Performed by: FAMILY MEDICINE

## 2019-08-23 PROCEDURE — G0103 PSA SCREENING: HCPCS | Performed by: FAMILY MEDICINE

## 2019-08-23 PROCEDURE — 80048 BASIC METABOLIC PNL TOTAL CA: CPT | Performed by: FAMILY MEDICINE

## 2019-08-29 ENCOUNTER — TELEPHONE (OUTPATIENT)
Dept: FAMILY MEDICINE | Facility: CLINIC | Age: 49
End: 2019-08-29

## 2019-08-30 ENCOUNTER — OFFICE VISIT (OUTPATIENT)
Dept: FAMILY MEDICINE | Facility: CLINIC | Age: 49
End: 2019-08-30
Payer: COMMERCIAL

## 2019-08-30 VITALS
HEART RATE: 56 BPM | SYSTOLIC BLOOD PRESSURE: 142 MMHG | WEIGHT: 221 LBS | DIASTOLIC BLOOD PRESSURE: 64 MMHG | RESPIRATION RATE: 20 BRPM | HEIGHT: 76 IN | BODY MASS INDEX: 26.91 KG/M2 | TEMPERATURE: 97.8 F

## 2019-08-30 DIAGNOSIS — R73.03 PREDIABETES: ICD-10-CM

## 2019-08-30 DIAGNOSIS — E78.5 HYPERLIPIDEMIA LDL GOAL <130: ICD-10-CM

## 2019-08-30 DIAGNOSIS — Z00.00 ENCOUNTER FOR PREVENTIVE HEALTH EXAMINATION: Primary | ICD-10-CM

## 2019-08-30 DIAGNOSIS — I10 HYPERTENSION GOAL BP (BLOOD PRESSURE) < 140/90: ICD-10-CM

## 2019-08-30 LAB — HBA1C MFR BLD: 6.1 % (ref 0–5.6)

## 2019-08-30 PROCEDURE — 36415 COLL VENOUS BLD VENIPUNCTURE: CPT | Performed by: FAMILY MEDICINE

## 2019-08-30 PROCEDURE — 83036 HEMOGLOBIN GLYCOSYLATED A1C: CPT | Performed by: FAMILY MEDICINE

## 2019-08-30 PROCEDURE — 99213 OFFICE O/P EST LOW 20 MIN: CPT | Mod: 25 | Performed by: FAMILY MEDICINE

## 2019-08-30 PROCEDURE — 99396 PREV VISIT EST AGE 40-64: CPT | Performed by: FAMILY MEDICINE

## 2019-08-30 RX ORDER — AMLODIPINE BESYLATE 10 MG/1
10 TABLET ORAL DAILY
Qty: 90 TABLET | Refills: 1 | Status: CANCELLED | OUTPATIENT
Start: 2019-08-30

## 2019-08-30 RX ORDER — LOSARTAN POTASSIUM 50 MG/1
50 TABLET ORAL DAILY
Qty: 90 TABLET | Refills: 3 | Status: SHIPPED | OUTPATIENT
Start: 2019-08-30 | End: 2019-11-29 | Stop reason: DRUGHIGH

## 2019-08-30 RX ORDER — GLUC/MSM/COLGN2/HYAL/ANTIARTH3 375-375-20
2 TABLET ORAL DAILY
COMMUNITY
End: 2021-11-23

## 2019-08-30 RX ORDER — SIMVASTATIN 20 MG
20 TABLET ORAL AT BEDTIME
Qty: 90 TABLET | Refills: 3 | Status: SHIPPED | OUTPATIENT
Start: 2019-08-30 | End: 2020-09-22

## 2019-08-30 ASSESSMENT — ENCOUNTER SYMPTOMS
HEADACHES: 0
NAUSEA: 0
SHORTNESS OF BREATH: 0
EYE PAIN: 0
DIARRHEA: 0
DYSURIA: 0
PALPITATIONS: 0
JOINT SWELLING: 0
COUGH: 0
SORE THROAT: 0
FEVER: 0
MYALGIAS: 0
HEMATOCHEZIA: 0
HEARTBURN: 0
FREQUENCY: 0
DIZZINESS: 0
PARESTHESIAS: 0
CONSTIPATION: 0
CHILLS: 0
HEMATURIA: 0
WEAKNESS: 0
ARTHRALGIAS: 0
NERVOUS/ANXIOUS: 0
ABDOMINAL PAIN: 0

## 2019-08-30 ASSESSMENT — MIFFLIN-ST. JEOR: SCORE: 1963.95

## 2019-08-30 NOTE — PROGRESS NOTES
SUBJECTIVE:   CC: Teddy Shah is an 49 year old male who presents for preventative health visit.     Healthy Habits:     Getting at least 3 servings of Calcium per day:  NO    Bi-annual eye exam:  Yes    Dental care twice a year:  Yes    Sleep apnea or symptoms of sleep apnea:  None    Diet:  Regular (no restrictions)    Frequency of exercise:  2-3 days/week    Duration of exercise:  15-30 minutes    Taking medications regularly:  Yes    Medication side effects:  Other    PHQ-2 Total Score: 0    Additional concerns today:  No    Work biographic form - I filled this out and gave to him.    Prediabetes - does not check glucose at home - does not have monitor. Denies symptoms of high or low glucose.  Evaluation and treatment:    Eye exam ???   Foot exam ???   Urine albumin - negative 8/23/19   Metformin  mg daily - no side effects.   After reviewing records (he had only one A1c and one glucose in the diabetes range), we decided to stop the Metformin and focus on diet and exercise.    Lab Results   Component Value Date    A1C 6.1 08/30/2019    A1C 6.4 01/30/2019    A1C 6.6 04/27/2018    A1C 6.4 08/01/2017    A1C 6.2 07/22/2016       HTN - not checking at home. Fairly controlled in clinic.  Evaluation and treatment:   Norvasc 10 mg qd - minor leg swelling.   I asked him to stop the Norvasc and try Losartan 50 mg daily - start with 1/2 per day for about 5 days.   Check BP at home and send results in 2 weeks.      BP Readings from Last 6 Encounters:   08/30/19 (!) 142/64   02/05/19 137/79   04/30/18 138/86   08/04/17 138/78   07/28/16 124/66   05/26/16 113/65       Last Comprehensive Metabolic Panel:  Sodium   Date Value Ref Range Status   08/23/2019 141 133 - 144 mmol/L Final     Potassium   Date Value Ref Range Status   08/23/2019 4.0 3.4 - 5.3 mmol/L Final     Chloride   Date Value Ref Range Status   08/23/2019 105 94 - 109 mmol/L Final     Carbon Dioxide   Date Value Ref Range Status   08/23/2019 26 20 - 32  mmol/L Final     Anion Gap   Date Value Ref Range Status   08/23/2019 10 3 - 14 mmol/L Final     Glucose   Date Value Ref Range Status   08/23/2019 104 (H) 70 - 99 mg/dL Final     Comment:     Fasting specimen     Urea Nitrogen   Date Value Ref Range Status   08/23/2019 12 7 - 30 mg/dL Final     Creatinine   Date Value Ref Range Status   08/23/2019 1.05 0.66 - 1.25 mg/dL Final     GFR Estimate   Date Value Ref Range Status   08/23/2019 82 >60 mL/min/[1.73_m2] Final     Comment:     Non  GFR Calc  Starting 12/18/2018, serum creatinine based estimated GFR (eGFR) will be   calculated using the Chronic Kidney Disease Epidemiology Collaboration   (CKD-EPI) equation.       Calcium   Date Value Ref Range Status   08/23/2019 9.4 8.5 - 10.1 mg/dL Final     Dyslipidemia - No history of CAD, CVA, PAD. But has prediabetes. There is FH or MI.  Evaluation and treatment:   Evaluation and treatment:   Zocor 20 mg qd - some mucle cramps - discussed option to switch to another statin but he wants to wait.    Fish Oil 1 gm qd - has not been taking    Co Q 10 qd -     The 10-year ASCVD risk score (Uticapardeep VO Jr., et al., 2013) is: 8.5%    Values used to calculate the score:      Age: 49 years      Sex: Male      Is Non- : No      Diabetic: Yes      Tobacco smoker: No      Systolic Blood Pressure: 142 mmHg      Is BP treated: Yes      HDL Cholesterol: 36 mg/dL      Total Cholesterol: 148 mg/dL    Recent Labs   Lab Test 08/23/19  0741 01/30/19  0811  05/12/15 03/12/15  0820   CHOL 148 165   < > 181 147   HDL 36* 32*   < > 35 31*   LDL 81 86   < > 94 58   TRIG 153* 234*   < > 260 288*   CHOLHDLRATIO  --   --   --  5.2 4.7    < > = values in this interval not displayed.       GERD -  Evaluation and treatment:   Prilosec 40 mg qd - not needing it lately.    Overweight - diet and exercise discussed.    Body mass index is 26.9 kg/m .     Wt Readings from Last 5 Encounters:   08/30/19 100.2 kg (221 lb)    02/05/19 102.5 kg (226 lb)   04/30/18 102.1 kg (225 lb)   08/04/17 101.6 kg (224 lb)   07/28/16 101.6 kg (224 lb)       Preventive - advised to get Shingrix from our pharmacy.     Immunization History   Administered Date(s) Administered     Influenza Intranasal Vaccine 4 valent 12/12/2014     Influenza Vaccine IM > 6 months Valent IIV4 01/09/2018     TDAP Vaccine (Adacel) 06/30/2010     -STD screen: declines    - Colon CA screen: there is family history - Colonoscopy 5/26/16 inflammatory polyp - repeat in 5 years.    - Prostate CA screen: Discussed controversy about screening.   PSA   Date Value Ref Range Status   08/23/2019 2.07 0 - 4 ug/L Final     Comment:     Assay Method:  Chemiluminescence using Siemens Vista analyzer       SH:    Marital status:   Kids: 3  Employment: sales  Exercise: hockey once per week, motorcycling  Tobacco: no  Etoh: occ  Recreational drugs: no  Caffeine: monster daily    FH:    Dad passed away age 47 due to MI          Today's PHQ-2 Score:   PHQ-2 ( 1999 Pfizer) 8/30/2019   Q1: Little interest or pleasure in doing things 0   Q2: Feeling down, depressed or hopeless 0   PHQ-2 Score 0   Q1: Little interest or pleasure in doing things Not at all   Q2: Feeling down, depressed or hopeless Not at all   PHQ-2 Score 0       Abuse: Current or Past(Physical, Sexual or Emotional)- No  Do you feel safe in your environment? Yes    Social History     Tobacco Use     Smoking status: Never Smoker     Smokeless tobacco: Never Used     Tobacco comment: Lives in smoke free household   Substance Use Topics     Alcohol use: Yes     Comment: occasional         Alcohol Use 8/30/2019   Prescreen: >3 drinks/day or >7 drinks/week? No   Prescreen: >3 drinks/day or >7 drinks/week? -       Last PSA:   PSA   Date Value Ref Range Status   08/23/2019 2.07 0 - 4 ug/L Final     Comment:     Assay Method:  Chemiluminescence using Siemens Vista analyzer       Reviewed orders with patient. Reviewed health  "maintenance and updated orders accordingly - Yes      Reviewed and updated as needed this visit by clinical staff         Reviewed and updated as needed this visit by Provider            Review of Systems   Constitutional: Negative for chills and fever.   HENT: Negative for congestion, ear pain, hearing loss and sore throat.    Eyes: Negative for pain and visual disturbance.   Respiratory: Negative for cough and shortness of breath.    Cardiovascular: Negative for chest pain, palpitations and peripheral edema.   Gastrointestinal: Negative for abdominal pain, constipation, diarrhea, heartburn, hematochezia and nausea.   Genitourinary: Negative for dysuria, frequency, genital sores, hematuria and urgency.   Musculoskeletal: Negative for arthralgias, joint swelling and myalgias.   Skin: Negative for rash.   Neurological: Negative for dizziness, weakness, headaches and paresthesias.   Psychiatric/Behavioral: Negative for mood changes. The patient is not nervous/anxious.        OBJECTIVE:   BP (!) 142/64   Pulse 56   Temp 97.8  F (36.6  C) (Oral)   Resp 20   Ht 1.93 m (6' 4\")   Wt 100.2 kg (221 lb)   BMI 26.90 kg/m      Physical Exam  GENERAL: healthy, alert and no distress  EYES: Eyes grossly normal to inspection, PERRL and conjunctivae and sclerae normal  HENT: ear canals and TM's normal, nose and mouth without ulcers or lesions  NECK: no adenopathy, no asymmetry, masses, or scars and thyroid normal to palpation  RESP: lungs clear to auscultation - no rales, rhonchi or wheezes  CV: regular rate and rhythm, normal S1 S2, no S3 or S4, no murmur, click or rub, no peripheral edema and peripheral pulses strong  ABDOMEN: soft, nontender, no hepatosplenomegaly, no masses and bowel sounds normal  MS: no gross musculoskeletal defects noted, no edema  SKIN: no suspicious lesions or rashes  NEURO: Normal strength and tone, mentation intact and speech normal  PSYCH: mentation appears normal, affect " "normal/bright      ASSESSMENT/PLAN:       COUNSELING:   Reviewed preventive health counseling, as reflected in patient instructions       Regular exercise       Healthy diet/nutrition    Estimated body mass index is 27.51 kg/m  as calculated from the following:    Height as of 2/5/19: 1.93 m (6' 4\").    Weight as of 2/5/19: 102.5 kg (226 lb).     Weight management plan: Discussed healthy diet and exercise guidelines     reports that he has never smoked. He has never used smokeless tobacco.      Assessment and Plan - Decision Making    1. Encounter for preventive health examination    Results of today's exam given to patient verbally along with age appropriate preventive measures. Written instructions reviewed and handed to the patient.      2. Prediabetes    Per HPI    - Hemoglobin A1c    3. Hypertension goal BP (blood pressure) < 140/90    Per HPI    - losartan (COZAAR) 50 MG tablet; Take 1 tablet (50 mg) by mouth daily  Dispense: 90 tablet; Refill: 3    4. Hyperlipidemia LDL goal <130    Per HPI    - simvastatin (ZOCOR) 20 MG tablet; Take 1 tablet (20 mg) by mouth At Bedtime For cholesterol  Dispense: 90 tablet; Refill: 3      Written instructions given as follows:    Patient Instructions   1. I recommend including veggies, fresh fruits (3 to 5 servings), nuts (unsalted) in your daily diet. Cut down on carbs like bread, pasta, rice, potatoes. Cut down on red meats like burgers etc. Increase healthy proteins like beans, tofu, tuna, chicken and turkey. Cut out pop.    2. Get regular exercise like jogging, biking, swimming at least 5 times per week (150 minutes per week).      3. Avoid the sun or otherwise use sun screen - please look at the guide I gave you about melanoma.    4. See the dentist and eye doctor regularly.     5. Let's stop the Metformin.    6. Let's stop Amlodipine and start Losartan 50 mg daily (start with 1/2 for 5 days and then go to a full pill).    7. Stop by our pharmacy and get the Shingles " vaccine.    8. Send My Chart message in 2 weeks and send me BP records.

## 2019-08-30 NOTE — PATIENT INSTRUCTIONS
1. I recommend including veggies, fresh fruits (3 to 5 servings), nuts (unsalted) in your daily diet. Cut down on carbs like bread, pasta, rice, potatoes. Cut down on red meats like burgers etc. Increase healthy proteins like beans, tofu, tuna, chicken and turkey. Cut out pop.    2. Get regular exercise like jogging, biking, swimming at least 5 times per week (150 minutes per week).      3. Avoid the sun or otherwise use sun screen - please look at the guide I gave you about melanoma.    4. See the dentist and eye doctor regularly.     5. Let's stop the Metformin.    6. Let's stop Amlodipine and start Losartan 50 mg daily (start with 1/2 for 5 days and then go to a full pill).    7. Stop by our pharmacy and get the Shingles vaccine.    8. Send My Chart message in 2 weeks and send me BP records.

## 2019-08-30 NOTE — NURSING NOTE
"Chief Complaint   Patient presents with     Physical       Initial BP (!) 142/64   Pulse 56   Temp 97.8  F (36.6  C) (Oral)   Resp 20   Ht 1.93 m (6' 4\")   Wt 100.2 kg (221 lb)   BMI 26.90 kg/m   Estimated body mass index is 26.9 kg/m  as calculated from the following:    Height as of this encounter: 1.93 m (6' 4\").    Weight as of this encounter: 100.2 kg (221 lb).    Taryn Ortiz CMA    "

## 2019-09-30 ENCOUNTER — HEALTH MAINTENANCE LETTER (OUTPATIENT)
Age: 49
End: 2019-09-30

## 2019-10-17 ENCOUNTER — MYC MEDICAL ADVICE (OUTPATIENT)
Dept: FAMILY MEDICINE | Facility: CLINIC | Age: 49
End: 2019-10-17

## 2019-10-17 DIAGNOSIS — I10 ESSENTIAL HYPERTENSION WITH GOAL BLOOD PRESSURE LESS THAN 140/90: Primary | ICD-10-CM

## 2019-10-21 RX ORDER — HYDROCHLOROTHIAZIDE 12.5 MG/1
12.5 TABLET ORAL DAILY
Qty: 30 TABLET | Refills: 1 | Status: SHIPPED | OUTPATIENT
Start: 2019-10-21 | End: 2020-02-13

## 2019-10-28 ENCOUNTER — ALLIED HEALTH/NURSE VISIT (OUTPATIENT)
Dept: NURSING | Facility: CLINIC | Age: 49
End: 2019-10-28
Payer: COMMERCIAL

## 2019-10-28 DIAGNOSIS — Z23 NEED FOR PROPHYLACTIC VACCINATION AND INOCULATION AGAINST INFLUENZA: Primary | ICD-10-CM

## 2019-10-28 PROCEDURE — 90472 IMMUNIZATION ADMIN EACH ADD: CPT

## 2019-10-28 PROCEDURE — 90732 PPSV23 VACC 2 YRS+ SUBQ/IM: CPT

## 2019-10-28 PROCEDURE — 90471 IMMUNIZATION ADMIN: CPT

## 2019-10-28 PROCEDURE — 90682 RIV4 VACC RECOMBINANT DNA IM: CPT

## 2019-10-28 NOTE — PROGRESS NOTES
Prior to immunization administration, verified patients identity using patient s name and date of birth. Please see Immunization Activity for additional information.     Screening Questionnaire for Adult Immunization    Are you sick today?   No   Do you have allergies to medications, food, a vaccine component or latex?   No   Have you ever had a serious reaction after receiving a vaccination?   No   Do you have a long-term health problem with heart disease, lung disease, asthma, kidney disease, metabolic disease (e.g. diabetes), anemia, or other blood disorder?   No   Do you have cancer, leukemia, HIV/AIDS, or any other immune system problem?   No   In the past 3 months, have you taken medications that affect  your immune system, such as prednisone, other steroids, or anticancer drugs; drugs for the treatment of rheumatoid arthritis, Crohn s disease, or psoriasis; or have you had radiation treatments?   No   Have you had a seizure, or a brain or other nervous system problem?   No   During the past year, have you received a transfusion of blood or blood     products, or been given immune (gamma) globulin or antiviral drug?   No   For women: Are you pregnant or is there a chance you could become        pregnant during the next month?   No   Have you received any vaccinations in the past 4 weeks?   No     Immunization questionnaire answers were all negative.        Per orders of Dr. Oliver, injection of Flu Pneumonia 23 given by Charmaine Alonso CMA. Patient instructed to remain in clinic for 15 minutes afterwards, and to report any adverse reaction to me immediately.       Screening performed by Charmaine Alonso CMA on 10/28/2019 at 4:05 PM.

## 2019-11-26 ENCOUNTER — MYC MEDICAL ADVICE (OUTPATIENT)
Dept: FAMILY MEDICINE | Facility: CLINIC | Age: 49
End: 2019-11-26

## 2019-11-26 DIAGNOSIS — I10 ESSENTIAL HYPERTENSION WITH GOAL BLOOD PRESSURE LESS THAN 140/90: Primary | ICD-10-CM

## 2019-11-29 RX ORDER — LOSARTAN POTASSIUM 100 MG/1
100 TABLET ORAL DAILY
Qty: 30 TABLET | Refills: 1 | Status: SHIPPED | OUTPATIENT
Start: 2019-11-29 | End: 2020-01-07

## 2019-12-02 DIAGNOSIS — J30.2 SEASONAL ALLERGIC RHINITIS: ICD-10-CM

## 2019-12-03 RX ORDER — FEXOFENADINE HYDROCHLORIDE 180 MG/1
TABLET, FILM COATED ORAL
Qty: 90 TABLET | Refills: 1 | Status: SHIPPED | OUTPATIENT
Start: 2019-12-03

## 2019-12-03 NOTE — TELEPHONE ENCOUNTER
Pres[place] per [facility] [place].  Jo Orozco RN

## 2019-12-04 ENCOUNTER — TELEPHONE (OUTPATIENT)
Dept: FAMILY MEDICINE | Facility: CLINIC | Age: 49
End: 2019-12-04

## 2019-12-04 NOTE — TELEPHONE ENCOUNTER
See 11/26 Vrvana message  To provider to sign form (in basket) to be faxed to pharmacy    Princess DARBYN, RN, CPN

## 2019-12-04 NOTE — TELEPHONE ENCOUNTER
Form filled out for Losartan 100mg     - please fax form to pharmacy    Princess DARBYN, RN, CPN

## 2019-12-04 NOTE — TELEPHONE ENCOUNTER
Message: We are processing a presc and our records indicate that a drug was recently filled in the same therapeutic category. Please clarify current therapy, sign, and return.     Req: Losartan 50mg tabs #90  Recently filled: Losartan Potassium 100mg #30.

## 2019-12-04 NOTE — TELEPHONE ENCOUNTER
Patient shouldn't be on both 100 and 50mg. Just 100mg - top dosage. If has 50mg can keep them and that 2x50=100mg but max dosage is 100mg daily. Hasmukh Vick MD

## 2020-01-07 ENCOUNTER — MYC REFILL (OUTPATIENT)
Dept: FAMILY MEDICINE | Facility: CLINIC | Age: 50
End: 2020-01-07

## 2020-01-07 ENCOUNTER — MYC MEDICAL ADVICE (OUTPATIENT)
Dept: FAMILY MEDICINE | Facility: CLINIC | Age: 50
End: 2020-01-07

## 2020-01-07 DIAGNOSIS — I10 ESSENTIAL HYPERTENSION WITH GOAL BLOOD PRESSURE LESS THAN 140/90: ICD-10-CM

## 2020-01-07 RX ORDER — AMLODIPINE BESYLATE 5 MG/1
5 TABLET ORAL DAILY
Qty: 30 TABLET | Refills: 1 | Status: SHIPPED | OUTPATIENT
Start: 2020-01-07 | End: 2020-03-11

## 2020-01-07 RX ORDER — HYDROCHLOROTHIAZIDE 25 MG/1
25 TABLET ORAL DAILY
Qty: 30 TABLET | Refills: 1 | Status: SHIPPED | OUTPATIENT
Start: 2020-01-07 | End: 2020-03-18

## 2020-01-07 RX ORDER — HYDROCHLOROTHIAZIDE 12.5 MG/1
12.5 TABLET ORAL DAILY
Qty: 30 TABLET | Refills: 1 | Status: CANCELLED | OUTPATIENT
Start: 2020-01-07

## 2020-01-07 RX ORDER — LOSARTAN POTASSIUM 100 MG/1
100 TABLET ORAL DAILY
Qty: 30 TABLET | Refills: 1 | Status: SHIPPED | OUTPATIENT
Start: 2020-01-07 | End: 2020-03-11

## 2020-01-07 RX ORDER — LOSARTAN POTASSIUM 100 MG/1
100 TABLET ORAL DAILY
Qty: 30 TABLET | Refills: 1 | Status: CANCELLED | OUTPATIENT
Start: 2020-01-07

## 2020-02-11 ENCOUNTER — MYC MEDICAL ADVICE (OUTPATIENT)
Dept: FAMILY MEDICINE | Facility: CLINIC | Age: 50
End: 2020-02-11

## 2020-02-11 ENCOUNTER — MYC REFILL (OUTPATIENT)
Dept: FAMILY MEDICINE | Facility: CLINIC | Age: 50
End: 2020-02-11

## 2020-02-11 DIAGNOSIS — I10 ESSENTIAL HYPERTENSION WITH GOAL BLOOD PRESSURE LESS THAN 140/90: ICD-10-CM

## 2020-02-11 RX ORDER — AMLODIPINE BESYLATE 5 MG/1
5 TABLET ORAL DAILY
Qty: 30 TABLET | Refills: 1 | Status: CANCELLED | OUTPATIENT
Start: 2020-02-11

## 2020-02-11 RX ORDER — LOSARTAN POTASSIUM 100 MG/1
100 TABLET ORAL DAILY
Qty: 30 TABLET | Refills: 1 | Status: CANCELLED | OUTPATIENT
Start: 2020-02-11

## 2020-02-12 NOTE — TELEPHONE ENCOUNTER
"Refills available at [facility] on orders sent 1/7/20 and pt is due for office visit. Message sent to pt.    Requested Prescriptions   Pending Prescriptions Disp Refills     losartan (COZAAR) 100 MG tablet 30 tablet 1     Sig: Take 1 tablet (100 mg) by mouth daily       Angiotensin-II Receptors Failed - 2/11/2020  3:11 PM        Failed - Last blood pressure under 140/90 in past 12 months     BP Readings from Last 3 Encounters:   08/30/19 (!) 142/64   02/05/19 137/79   04/30/18 138/86                 Passed - Recent (12 mo) or future (30 days) visit within the authorizing provider's specialty     Patient has had an office visit with the authorizing provider or a provider within the authorizing providers department within the previous 12 mos or has a future within next 30 days. See \"Patient Info\" tab in inbasket, or \"Choose Columns\" in Meds & Orders section of the refill encounter.              Passed - Medication is active on med list        Passed - Patient is age 18 or older        Passed - Normal serum creatinine on file in past 12 months     Recent Labs   Lab Test 08/23/19  0741   CR 1.05             Passed - Normal serum potassium on file in past 12 months     Recent Labs   Lab Test 08/23/19  0741   POTASSIUM 4.0                    amLODIPine (NORVASC) 5 MG tablet 30 tablet 1     Sig: Take 1 tablet (5 mg) by mouth daily       Calcium Channel Blockers Protocol  Failed - 2/11/2020  3:11 PM        Failed - Blood pressure under 140/90 in past 12 months     BP Readings from Last 3 Encounters:   08/30/19 (!) 142/64   02/05/19 137/79   04/30/18 138/86                 Passed - Recent (12 mo) or future (30 days) visit within the authorizing provider's specialty     Patient has had an office visit with the authorizing provider or a provider within the authorizing providers department within the previous 12 mos or has a future within next 30 days. See \"Patient Info\" tab in inbasket, or \"Choose Columns\" in " Meds & Orders section of the refill encounter.              Passed - Medication is active on med list        Passed - Patient is age 18 or older        Passed - Normal serum creatinine on file in past 12 months     Recent Labs   Lab Test 08/23/19  0741   CR 1.05             Mitzy Coleman, MEHNAZN, RN

## 2020-03-09 ENCOUNTER — DOCUMENTATION ONLY (OUTPATIENT)
Dept: LAB | Facility: CLINIC | Age: 50
End: 2020-03-09

## 2020-03-09 DIAGNOSIS — I10 HYPERTENSION GOAL BP (BLOOD PRESSURE) < 140/90: ICD-10-CM

## 2020-03-09 DIAGNOSIS — R73.9 HYPERGLYCEMIA: Primary | ICD-10-CM

## 2020-03-09 NOTE — PROGRESS NOTES
Please review ADD and sign Pending Pre-visit Labs in [facility]. Labs 03/11/20 and BP 03/18/20   Jodie BUTTERFIELD

## 2020-03-11 DIAGNOSIS — R73.9 HYPERGLYCEMIA: ICD-10-CM

## 2020-03-11 DIAGNOSIS — I10 ESSENTIAL HYPERTENSION WITH GOAL BLOOD PRESSURE LESS THAN 140/90: ICD-10-CM

## 2020-03-11 DIAGNOSIS — I10 HYPERTENSION GOAL BP (BLOOD PRESSURE) < 140/90: ICD-10-CM

## 2020-03-11 LAB
ANION GAP SERPL CALCULATED.3IONS-SCNC: 8 MMOL/L (ref 3–14)
BUN SERPL-MCNC: 14 MG/DL (ref 7–30)
CALCIUM SERPL-MCNC: 9.4 MG/DL (ref 8.5–10.1)
CHLORIDE SERPL-SCNC: 104 MMOL/L (ref 94–109)
CO2 SERPL-SCNC: 29 MMOL/L (ref 20–32)
CREAT SERPL-MCNC: 1.02 MG/DL (ref 0.66–1.25)
GFR SERPL CREATININE-BSD FRML MDRD: 85 ML/MIN/{1.73_M2}
GLUCOSE SERPL-MCNC: 143 MG/DL (ref 70–99)
HBA1C MFR BLD: 6.4 % (ref 0–5.6)
POTASSIUM SERPL-SCNC: 4 MMOL/L (ref 3.4–5.3)
SODIUM SERPL-SCNC: 141 MMOL/L (ref 133–144)

## 2020-03-11 PROCEDURE — 80048 BASIC METABOLIC PNL TOTAL CA: CPT | Performed by: FAMILY MEDICINE

## 2020-03-11 PROCEDURE — 83036 HEMOGLOBIN GLYCOSYLATED A1C: CPT | Performed by: FAMILY MEDICINE

## 2020-03-11 PROCEDURE — 36415 COLL VENOUS BLD VENIPUNCTURE: CPT | Performed by: FAMILY MEDICINE

## 2020-03-11 RX ORDER — LOSARTAN POTASSIUM 100 MG/1
100 TABLET ORAL DAILY
Qty: 30 TABLET | Refills: 0 | Status: SHIPPED | OUTPATIENT
Start: 2020-03-11 | End: 2020-03-18

## 2020-03-11 RX ORDER — AMLODIPINE BESYLATE 5 MG/1
5 TABLET ORAL DAILY
Qty: 30 TABLET | Refills: 1 | Status: SHIPPED | OUTPATIENT
Start: 2020-03-11 | End: 2020-03-18

## 2020-03-12 ENCOUNTER — MYC MEDICAL ADVICE (OUTPATIENT)
Dept: FAMILY MEDICINE | Facility: CLINIC | Age: 50
End: 2020-03-12

## 2020-03-18 ENCOUNTER — OFFICE VISIT (OUTPATIENT)
Dept: FAMILY MEDICINE | Facility: CLINIC | Age: 50
End: 2020-03-18
Payer: COMMERCIAL

## 2020-03-18 VITALS
HEART RATE: 66 BPM | WEIGHT: 224 LBS | TEMPERATURE: 97.1 F | SYSTOLIC BLOOD PRESSURE: 132 MMHG | BODY MASS INDEX: 27.27 KG/M2 | DIASTOLIC BLOOD PRESSURE: 60 MMHG

## 2020-03-18 DIAGNOSIS — E78.5 DYSLIPIDEMIA: Primary | ICD-10-CM

## 2020-03-18 DIAGNOSIS — I10 ESSENTIAL HYPERTENSION WITH GOAL BLOOD PRESSURE LESS THAN 140/90: ICD-10-CM

## 2020-03-18 PROCEDURE — 99214 OFFICE O/P EST MOD 30 MIN: CPT | Performed by: FAMILY MEDICINE

## 2020-03-18 RX ORDER — AMLODIPINE BESYLATE 5 MG/1
5 TABLET ORAL DAILY
Qty: 90 TABLET | Refills: 3 | Status: SHIPPED | OUTPATIENT
Start: 2020-03-18 | End: 2020-07-21

## 2020-03-18 RX ORDER — LOSARTAN POTASSIUM 100 MG/1
100 TABLET ORAL DAILY
Qty: 90 TABLET | Refills: 3 | Status: SHIPPED | OUTPATIENT
Start: 2020-03-18 | End: 2021-03-03

## 2020-03-18 RX ORDER — HYDROCHLOROTHIAZIDE 25 MG/1
25 TABLET ORAL DAILY
Qty: 90 TABLET | Refills: 3 | Status: SHIPPED | OUTPATIENT
Start: 2020-03-18 | End: 2020-07-21

## 2020-03-18 ASSESSMENT — PAIN SCALES - GENERAL: PAINLEVEL: NO PAIN (0)

## 2020-03-18 NOTE — NURSING NOTE
"Chief Complaint   Patient presents with     Hypertension       Initial BP (!) 142/72   Pulse 66   Temp 97.1  F (36.2  C) (Oral)   Wt 101.6 kg (224 lb)   BMI 27.27 kg/m   Estimated body mass index is 27.27 kg/m  as calculated from the following:    Height as of 8/30/19: 1.93 m (6' 4\").    Weight as of this encounter: 101.6 kg (224 lb).  Medication Reconciliation: complete    SMILEY Moody MA    "

## 2020-03-18 NOTE — PROGRESS NOTES
HPI:    Vasyl is a 49 year old male here for follow-up:    Biometric form - we did his last one less than a year ago. But he has changed insurance so we will send this for him after we get lab results.    Diabetes 2 - has glucose monitor at home but does not check at home, citing he does not like needles. Denies symptoms of high or low glucose.  Evaluation and treatment:    Eye exam - advised to set up.    Foot exam - normal 3/18/20.     Urine albumin - negative 8/23/19   Metformin  mg daily - stopped since not needed - we will focus on diet and exercise.    After reviewing records (he had only one A1c and one glucose in the diabetes range), we decided to stop the Metformin and focus on diet and exercise.    Lab Results   Component Value Date    A1C 6.4 03/11/2020    A1C 6.1 08/30/2019    A1C 6.4 01/30/2019    A1C 6.6 04/27/2018    A1C 6.4 08/01/2017     HTN - not checking at home. Fairly controlled in clinic.  Evaluation and treatment:   Norvasc 5 mg daily (10 mg caused minor leg swelling) - no side effects.   Losartan 100 mg daily - no side effects.   hydrochlorothiazide 25 mg daily - no side effects.   Continue same tx.    BP Readings from Last 6 Encounters:   03/18/20 132/60   08/30/19 (!) 142/64   02/05/19 137/79   04/30/18 138/86   08/04/17 138/78   07/28/16 124/66       Last Comprehensive Metabolic Panel:  Sodium   Date Value Ref Range Status   03/11/2020 141 133 - 144 mmol/L Final     Potassium   Date Value Ref Range Status   03/11/2020 4.0 3.4 - 5.3 mmol/L Final     Chloride   Date Value Ref Range Status   03/11/2020 104 94 - 109 mmol/L Final     Carbon Dioxide   Date Value Ref Range Status   03/11/2020 29 20 - 32 mmol/L Final     Anion Gap   Date Value Ref Range Status   03/11/2020 8 3 - 14 mmol/L Final     Glucose   Date Value Ref Range Status   03/11/2020 143 (H) 70 - 99 mg/dL Final     Comment:     Fasting specimen     Urea Nitrogen   Date Value Ref Range Status   03/11/2020 14 7 - 30 mg/dL Final      Creatinine   Date Value Ref Range Status   03/11/2020 1.02 0.66 - 1.25 mg/dL Final     GFR Estimate   Date Value Ref Range Status   03/11/2020 85 >60 mL/min/[1.73_m2] Final     Comment:     Non  GFR Calc  Starting 12/18/2018, serum creatinine based estimated GFR (eGFR) will be   calculated using the Chronic Kidney Disease Epidemiology Collaboration   (CKD-EPI) equation.       Calcium   Date Value Ref Range Status   03/11/2020 9.4 8.5 - 10.1 mg/dL Final     Dyslipidemia - No history of CAD, CVA, PAD. But has diabetes. There is FH or MI.  Evaluation and treatment:   Evaluation and treatment:   Zocor 20 mg qd - some mucle cramps - discussed option to switch to another statin but he wants to stay with the same med.    Fish Oil 1 gm qd - has not been taking    Co Q 10 qd -    Continue same tx.    The 10-year ASCVD risk score (Charlotte VO Jr., et al., 2013) is: 8.5%    Values used to calculate the score:      Age: 49 years      Sex: Male      Is Non- : No      Diabetic: Yes      Tobacco smoker: No      Systolic Blood Pressure: 142 mmHg      Is BP treated: Yes      HDL Cholesterol: 36 mg/dL      Total Cholesterol: 148 mg/dL      Recent Labs   Lab Test 08/23/19  0741 01/30/19  0811  05/12/15 03/12/15  0820   CHOL 148 165   < > 181 147   HDL 36* 32*   < > 35 31*   LDL 81 86   < > 94 58   TRIG 153* 234*   < > 260 288*   CHOLHDLRATIO  --   --   --  5.2 4.7    < > = values in this interval not displayed.       GERD -  Evaluation and treatment:   Prilosec 40 mg qd - not needing it lately.    Overweight - diet and exercise discussed.    Body mass index is 27.27 kg/m .     Wt Readings from Last 5 Encounters:   03/18/20 101.6 kg (224 lb)   08/30/19 100.2 kg (221 lb)   02/05/19 102.5 kg (226 lb)   04/30/18 102.1 kg (225 lb)   08/04/17 101.6 kg (224 lb)       Preventive - advised to get Shingrix from our pharmacy.     Immunization History   Administered Date(s) Administered     Influenza  Intranasal Vaccine 4 valent 12/12/2014     Influenza Quad, Recombinant, p-free (RIV4) 10/28/2019     Influenza Vaccine IM > 6 months Valent IIV4 01/09/2018     Pneumococcal 23 valent 10/28/2019     TDAP Vaccine (Adacel) 06/30/2010     -STD screen: declines    - Colon CA screen: there is family history - Colonoscopy 5/26/16 inflammatory polyp - repeat in 5 years.    - Prostate CA screen: Discussed controversy about screening.     PSA   Date Value Ref Range Status   08/23/2019 2.07 0 - 4 ug/L Final     Comment:     Assay Method:  Chemiluminescence using Siemens Vista analyzer       SH:    Marital status:   Kids: 3  Employment: sales  Exercise: hockey once per week, motorcycling  Tobacco: no  Etoh: occ  Recreational drugs: no  Caffeine: monster daily    FH:    Dad passed away age 47 due to MI    Exam:    /60   Pulse 66   Temp 97.1  F (36.2  C) (Oral)   Wt 101.6 kg (224 lb)   BMI 27.27 kg/m      Gen: Healthy appearing male in no acute distress  Eyes: Conjunctiva and sclera normal. Pupils react normally to light. No nystagmus.  Neck: No enlarged lymph nodes, thyromegally or other masses.  Lungs: Good air movement and otherwise clear.  CV: Heart RRR with no murmurs. No JVD, carotid bruits or leg edema.  Foot exam: Both feet appear normal by inspection. No calluses or other lesions. DP pluses are normal. The tips of the toes are warm with good capillary refill. Sensation intact per monofilament.    Assessment and Plan - Decision Making    1. Dyslipidemia    Per HPI    - Lipid panel reflex to direct LDL Fasting; Future    2. Essential hypertension with goal blood pressure less than 140/90    Per HPI    - losartan (COZAAR) 100 MG tablet; Take 1 tablet (100 mg) by mouth daily  Dispense: 90 tablet; Refill: 3  - hydrochlorothiazide (HYDRODIURIL) 25 MG tablet; Take 1 tablet (25 mg) by mouth daily  Dispense: 90 tablet; Refill: 3  - amLODIPine (NORVASC) 5 MG tablet; Take 1 tablet (5 mg) by mouth daily  Dispense:  90 tablet; Refill: 3      Written instructions given as follows:    Patient Instructions   See you in 6 months for a physical with fasting labs.

## 2020-04-16 ENCOUNTER — TELEPHONE (OUTPATIENT)
Dept: FAMILY MEDICINE | Facility: CLINIC | Age: 50
End: 2020-04-16

## 2020-04-16 NOTE — TELEPHONE ENCOUNTER
Please call patient:    I know the lab test on 4/3/20 was cancelled. Should I fill out the form based on cholesterol from 8/23/19.    Or help him make future lab appointment.    Levi Oliver M.D.

## 2020-04-16 NOTE — TELEPHONE ENCOUNTER
Called and spoke to patient. He does not care if you put in the lab values from last year, if you are comfortable with it. Otherwise, he will make a lab appointment when the virus slows down.Cynthia Valdes MA/TC

## 2020-05-19 ENCOUNTER — TELEPHONE (OUTPATIENT)
Dept: FAMILY MEDICINE | Facility: CLINIC | Age: 50
End: 2020-05-19

## 2020-05-19 ENCOUNTER — MYC MEDICAL ADVICE (OUTPATIENT)
Dept: FAMILY MEDICINE | Facility: CLINIC | Age: 50
End: 2020-05-19

## 2020-07-21 ENCOUNTER — TELEPHONE (OUTPATIENT)
Dept: FAMILY MEDICINE | Facility: CLINIC | Age: 50
End: 2020-07-21

## 2020-07-21 DIAGNOSIS — I10 ESSENTIAL HYPERTENSION WITH GOAL BLOOD PRESSURE LESS THAN 140/90: ICD-10-CM

## 2020-07-21 RX ORDER — HYDROCHLOROTHIAZIDE 25 MG/1
25 TABLET ORAL DAILY
Qty: 90 TABLET | Refills: 2 | Status: SHIPPED | OUTPATIENT
Start: 2020-07-21 | End: 2021-03-03

## 2020-07-21 RX ORDER — AMLODIPINE BESYLATE 5 MG/1
5 TABLET ORAL DAILY
Qty: 90 TABLET | Refills: 2 | Status: SHIPPED | OUTPATIENT
Start: 2020-07-21 | End: 2021-03-03

## 2020-07-21 NOTE — TELEPHONE ENCOUNTER
Reason for Call:  Medication or medication refill:    Do you use a [facility]?  Name of the pharmacy and phone number for the current request:  [facility] [address]  [phone]    Name of the medication requested: hydrochlorothiazide (HYDRODIURIL) 25 MG tablet, and amLODIPine (NORVASC) 5 MG tablet    Other request:     Can we leave a detailed message on this number? YES    Phone number patient can be reached at: [phone]    Best Time:     Call taken on 7/21/2020 at 11:04 AM by Dorita Pierre

## 2020-09-21 DIAGNOSIS — E78.5 HYPERLIPIDEMIA LDL GOAL <130: ICD-10-CM

## 2020-09-22 NOTE — TELEPHONE ENCOUNTER
Simvastatin refill request  Last OV Dr. Oliver was 3/18/20  Last lipid panel was 8/23/19; there is an order futured.  To MD to advise on refill; shows a potential interaction with amlodipine.  RN unable to address.

## 2020-09-23 RX ORDER — SIMVASTATIN 20 MG
TABLET ORAL
Qty: 90 TABLET | Refills: 0 | Status: SHIPPED | OUTPATIENT
Start: 2020-09-23 | End: 2021-03-02

## 2020-12-05 ENCOUNTER — TRANSFERRED RECORDS (OUTPATIENT)
Dept: HEALTH INFORMATION MANAGEMENT | Facility: CLINIC | Age: 50
End: 2020-12-05

## 2020-12-05 LAB — RETINOPATHY: NEGATIVE

## 2021-01-15 ENCOUNTER — HEALTH MAINTENANCE LETTER (OUTPATIENT)
Age: 51
End: 2021-01-15

## 2021-01-15 ENCOUNTER — MYC MEDICAL ADVICE (OUTPATIENT)
Dept: FAMILY MEDICINE | Facility: CLINIC | Age: 51
End: 2021-01-15

## 2021-01-15 DIAGNOSIS — E78.5 HYPERLIPIDEMIA LDL GOAL <130: ICD-10-CM

## 2021-01-15 DIAGNOSIS — I10 HYPERTENSION GOAL BP (BLOOD PRESSURE) < 140/90: ICD-10-CM

## 2021-01-15 DIAGNOSIS — R73.9 HYPERGLYCEMIA: ICD-10-CM

## 2021-01-15 DIAGNOSIS — Z12.5 SCREENING FOR PROSTATE CANCER: ICD-10-CM

## 2021-01-15 DIAGNOSIS — Z00.00 ROUTINE HISTORY AND PHYSICAL EXAMINATION OF ADULT: Primary | ICD-10-CM

## 2021-01-15 NOTE — TELEPHONE ENCOUNTER
Please review lab orders sign and close encounter. Cynthia Valdes MA/GREER    I informed patient to set up a physical and fasting lab appt

## 2021-02-14 ENCOUNTER — DOCUMENTATION ONLY (OUTPATIENT)
Dept: FAMILY MEDICINE | Facility: CLINIC | Age: 51
End: 2021-02-14

## 2021-02-14 DIAGNOSIS — R73.9 HYPERGLYCEMIA: ICD-10-CM

## 2021-02-14 DIAGNOSIS — E78.5 DYSLIPIDEMIA: ICD-10-CM

## 2021-02-14 DIAGNOSIS — Z12.5 SCREENING PSA (PROSTATE SPECIFIC ANTIGEN): ICD-10-CM

## 2021-02-14 DIAGNOSIS — I10 HYPERTENSION GOAL BP (BLOOD PRESSURE) < 140/90: Primary | ICD-10-CM

## 2021-02-15 NOTE — PROGRESS NOTES
Please review lab orders sign and close encounter. Cynthia Valdes MA/GREER    Med check appt 3/3/21

## 2021-02-28 DIAGNOSIS — E78.5 HYPERLIPIDEMIA LDL GOAL <130: ICD-10-CM

## 2021-03-01 DIAGNOSIS — Z12.5 SCREENING PSA (PROSTATE SPECIFIC ANTIGEN): ICD-10-CM

## 2021-03-01 DIAGNOSIS — E78.5 DYSLIPIDEMIA: ICD-10-CM

## 2021-03-01 DIAGNOSIS — I10 HYPERTENSION GOAL BP (BLOOD PRESSURE) < 140/90: ICD-10-CM

## 2021-03-01 DIAGNOSIS — R73.9 HYPERGLYCEMIA: ICD-10-CM

## 2021-03-01 LAB
ALBUMIN SERPL-MCNC: 4.7 G/DL (ref 3.4–5)
ANION GAP SERPL CALCULATED.3IONS-SCNC: 2 MMOL/L (ref 3–14)
BUN SERPL-MCNC: 18 MG/DL (ref 7–30)
CALCIUM SERPL-MCNC: 9.8 MG/DL (ref 8.5–10.1)
CHLORIDE SERPL-SCNC: 103 MMOL/L (ref 94–109)
CHOLEST SERPL-MCNC: 190 MG/DL
CO2 SERPL-SCNC: 33 MMOL/L (ref 20–32)
CREAT SERPL-MCNC: 1.03 MG/DL (ref 0.66–1.25)
GFR SERPL CREATININE-BSD FRML MDRD: 83 ML/MIN/{1.73_M2}
GLUCOSE SERPL-MCNC: 106 MG/DL (ref 70–99)
HBA1C MFR BLD: 6.7 % (ref 0–5.6)
HDLC SERPL-MCNC: 33 MG/DL
LDLC SERPL CALC-MCNC: 93 MG/DL
NONHDLC SERPL-MCNC: 157 MG/DL
PHOSPHATE SERPL-MCNC: 3.3 MG/DL (ref 2.5–4.5)
POTASSIUM SERPL-SCNC: 4.3 MMOL/L (ref 3.4–5.3)
PSA SERPL-ACNC: 1.71 UG/L (ref 0–4)
SODIUM SERPL-SCNC: 138 MMOL/L (ref 133–144)
TRIGL SERPL-MCNC: 322 MG/DL

## 2021-03-01 PROCEDURE — G0103 PSA SCREENING: HCPCS | Performed by: FAMILY MEDICINE

## 2021-03-01 PROCEDURE — 80061 LIPID PANEL: CPT | Performed by: FAMILY MEDICINE

## 2021-03-01 PROCEDURE — 83036 HEMOGLOBIN GLYCOSYLATED A1C: CPT | Performed by: FAMILY MEDICINE

## 2021-03-01 PROCEDURE — 80069 RENAL FUNCTION PANEL: CPT | Performed by: FAMILY MEDICINE

## 2021-03-01 PROCEDURE — 36415 COLL VENOUS BLD VENIPUNCTURE: CPT | Performed by: FAMILY MEDICINE

## 2021-03-02 ENCOUNTER — MYC MEDICAL ADVICE (OUTPATIENT)
Dept: FAMILY MEDICINE | Facility: CLINIC | Age: 51
End: 2021-03-02

## 2021-03-02 RX ORDER — SIMVASTATIN 20 MG
TABLET ORAL
Qty: 90 TABLET | Refills: 3 | Status: SHIPPED | OUTPATIENT
Start: 2021-03-02 | End: 2021-03-03 | Stop reason: ALTCHOICE

## 2021-03-03 ENCOUNTER — OFFICE VISIT (OUTPATIENT)
Dept: FAMILY MEDICINE | Facility: CLINIC | Age: 51
End: 2021-03-03
Payer: COMMERCIAL

## 2021-03-03 VITALS
TEMPERATURE: 96.2 F | DIASTOLIC BLOOD PRESSURE: 60 MMHG | SYSTOLIC BLOOD PRESSURE: 150 MMHG | BODY MASS INDEX: 26.79 KG/M2 | WEIGHT: 220 LBS | HEART RATE: 80 BPM | HEIGHT: 76 IN

## 2021-03-03 DIAGNOSIS — E78.5 DYSLIPIDEMIA: ICD-10-CM

## 2021-03-03 DIAGNOSIS — I10 ESSENTIAL HYPERTENSION WITH GOAL BLOOD PRESSURE LESS THAN 140/90: ICD-10-CM

## 2021-03-03 DIAGNOSIS — R06.83 SNORING: ICD-10-CM

## 2021-03-03 DIAGNOSIS — Z00.00 ROUTINE GENERAL MEDICAL EXAMINATION AT A HEALTH CARE FACILITY: Primary | ICD-10-CM

## 2021-03-03 DIAGNOSIS — E11.9 TYPE 2 DIABETES MELLITUS WITHOUT COMPLICATION, WITHOUT LONG-TERM CURRENT USE OF INSULIN (H): ICD-10-CM

## 2021-03-03 DIAGNOSIS — N52.9 ERECTILE DYSFUNCTION, UNSPECIFIED ERECTILE DYSFUNCTION TYPE: ICD-10-CM

## 2021-03-03 LAB
CREAT UR-MCNC: 107 MG/DL
MICROALBUMIN UR-MCNC: 12 MG/L
MICROALBUMIN/CREAT UR: 10.93 MG/G CR (ref 0–17)

## 2021-03-03 PROCEDURE — 90471 IMMUNIZATION ADMIN: CPT | Performed by: FAMILY MEDICINE

## 2021-03-03 PROCEDURE — 99213 OFFICE O/P EST LOW 20 MIN: CPT | Mod: 25 | Performed by: FAMILY MEDICINE

## 2021-03-03 PROCEDURE — 90750 HZV VACC RECOMBINANT IM: CPT | Performed by: FAMILY MEDICINE

## 2021-03-03 PROCEDURE — 99207 PR FOOT EXAM NO CHARGE: CPT | Mod: 25 | Performed by: FAMILY MEDICINE

## 2021-03-03 PROCEDURE — 99396 PREV VISIT EST AGE 40-64: CPT | Mod: 25 | Performed by: FAMILY MEDICINE

## 2021-03-03 PROCEDURE — 82043 UR ALBUMIN QUANTITATIVE: CPT | Performed by: FAMILY MEDICINE

## 2021-03-03 RX ORDER — SILDENAFIL CITRATE 20 MG/1
TABLET ORAL
Qty: 30 TABLET | Refills: 3 | Status: SHIPPED | OUTPATIENT
Start: 2021-03-03 | End: 2022-08-18

## 2021-03-03 RX ORDER — HYDRALAZINE HYDROCHLORIDE 25 MG/1
25 TABLET, FILM COATED ORAL 2 TIMES DAILY
Qty: 1 TABLET | Refills: 0 | Status: SHIPPED | OUTPATIENT
Start: 2021-03-03 | End: 2021-04-02

## 2021-03-03 RX ORDER — ATORVASTATIN CALCIUM 20 MG/1
20 TABLET, FILM COATED ORAL DAILY
Qty: 90 TABLET | Refills: 2 | Status: SHIPPED | OUTPATIENT
Start: 2021-06-01 | End: 2022-02-01

## 2021-03-03 RX ORDER — HYDRALAZINE HYDROCHLORIDE 25 MG/1
25 TABLET, FILM COATED ORAL 2 TIMES DAILY
Qty: 180 TABLET | Refills: 3 | Status: SHIPPED | OUTPATIENT
Start: 2021-03-03 | End: 2021-03-03

## 2021-03-03 RX ORDER — AMLODIPINE BESYLATE 5 MG/1
5 TABLET ORAL DAILY
Qty: 90 TABLET | Refills: 3 | Status: SHIPPED | OUTPATIENT
Start: 2021-03-03 | End: 2022-05-22

## 2021-03-03 RX ORDER — LOSARTAN POTASSIUM 100 MG/1
100 TABLET ORAL DAILY
Qty: 90 TABLET | Refills: 3 | Status: SHIPPED | OUTPATIENT
Start: 2021-03-03 | End: 2022-05-22

## 2021-03-03 RX ORDER — HYDRALAZINE HYDROCHLORIDE 25 MG/1
25 TABLET, FILM COATED ORAL 2 TIMES DAILY
Qty: 180 TABLET | Refills: 3 | Status: SHIPPED | OUTPATIENT
Start: 2021-03-03 | End: 2022-05-22

## 2021-03-03 RX ORDER — HYDROCHLOROTHIAZIDE 25 MG/1
25 TABLET ORAL DAILY
Qty: 90 TABLET | Refills: 3 | Status: SHIPPED | OUTPATIENT
Start: 2021-03-03 | End: 2022-05-22

## 2021-03-03 ASSESSMENT — ENCOUNTER SYMPTOMS
PALPITATIONS: 0
SORE THROAT: 0
FEVER: 0
HEADACHES: 0
JOINT SWELLING: 0
DYSURIA: 0
PARESTHESIAS: 0
HEMATURIA: 0
EYE PAIN: 0
CONSTIPATION: 0
CHILLS: 0
HEMATOCHEZIA: 0
DIZZINESS: 0
ABDOMINAL PAIN: 0
FREQUENCY: 1
HEARTBURN: 0
DIARRHEA: 0
MYALGIAS: 0
ARTHRALGIAS: 0
WEAKNESS: 0
NERVOUS/ANXIOUS: 1
SHORTNESS OF BREATH: 1
NAUSEA: 0
COUGH: 0

## 2021-03-03 ASSESSMENT — MIFFLIN-ST. JEOR: SCORE: 1950.44

## 2021-03-03 NOTE — PATIENT INSTRUCTIONS
1. Look up the DASH diet.    2. Keep up with your exercise - try to get down to 205-210#.    3. Avoid the sun or otherwise use sun screen.    4. See the dentist and eye doctor regularly.     5. After you run out of your Simvastatin, change to Lipitor (Atorvastatin) 20 mg daily - more potent.    6. Try generic Viagra as needed. It works better on empty stomach. Try low dose 1st and go up if not effective, up to 5 at a time. Possible side effects may be low blood pressure, dizziness, nasal congestion. Rare reports of vision or hearing problems.     7. Set up the sleep appointment - see below for phone number.    8. Check the blood pressure and heart rate daily - give those numbers to me in a few weeks.

## 2021-03-03 NOTE — PROGRESS NOTES
"  3  SUBJECTIVE:   CC: Teddy Shah is an 50 year old male who presents for preventive health visit.     {Split Bill scripting  The purpose of this visit is to discuss your medical history and prevent health problems before you are sick. You may be responsible for a co-pay, coinsurance, or deductible if your visit today includes services such as checking on a sore throat, having an x-ray or lab test, or treating and evaluating a new or existing condition :713534}  Patient has been advised of split billing requirements and indicates understanding: {Yes and No:882160}  Healthy Habits:    Do you get at least three servings of calcium containing foods daily (dairy, green leafy vegetables, etc.)? { :896810::\"yes\"}    Amount of exercise or daily activities, outside of work: { :933997}    Problems taking medications regularly { :476451::\"No\"}    Medication side effects: { :247982::\"No\"}    Have you had an eye exam in the past two years? { :959789}    Do you see a dentist twice per year? { :517143}    Do you have sleep apnea, excessive snoring or daytime drowsiness?{ :688932}  {Outside tests to abstract? :453968}    {additional problems to add (Optional):613578}    Today's PHQ-2 Score:   PHQ-2 ( 1999 Pfizer) 3/18/2020 8/30/2019   Q1: Little interest or pleasure in doing things 0 0   Q2: Feeling down, depressed or hopeless 0 0   PHQ-2 Score 0 0   Q1: Little interest or pleasure in doing things - Not at all   Q2: Feeling down, depressed or hopeless - Not at all   PHQ-2 Score - 0     {PHQ-2 LOOK IN ASSESSMENTS (Optional) :090676}  Abuse: Current or Past(Physical, Sexual or Emotional)- {YES/NO/NA:813861}  Do you feel safe in your environment? {YES/NO/NA:063635}    Have you ever done Advance Care Planning? (For example, a Health Directive, POLST, or a discussion with a medical provider or your loved ones about your wishes): { :614744}    Social History     Tobacco Use     Smoking status: Never Smoker     Smokeless tobacco: " "Never Used     Tobacco comment: Lives in smoke free household   Substance Use Topics     Alcohol use: Yes     Comment: occasional     If you drink alcohol do you typically have >3 drinks per day or >7 drinks per week? {ETOH :393183}                      Last PSA:   PSA   Date Value Ref Range Status   03/01/2021 1.71 0 - 4 ug/L Final     Comment:     Assay Method:  Chemiluminescence using Siemens Vista analyzer       Reviewed orders with patient. Reviewed health maintenance and updated orders accordingly - {Yes/No:410313::\"Yes\"}  {Chronicprobdata (Optional):306487}    Reviewed and updated as needed this visit by clinical staff                 Reviewed and updated as needed this visit by Provider                {HISTORY OPTIONS (Optional):964595}    ROS:  { :671743::\"CONSTITUTIONAL: NEGATIVE for fever, chills, change in weight\",\"INTEGUMENTARY/SKIN: NEGATIVE for worrisome rashes, moles or lesions\",\"EYES: NEGATIVE for vision changes or irritation\",\"ENT: NEGATIVE for ear, mouth and throat problems\",\"RESP: NEGATIVE for significant cough or SOB\",\"CV: NEGATIVE for chest pain, palpitations or peripheral edema\",\"GI: NEGATIVE for nausea, abdominal pain, heartburn, or change in bowel habits\",\" male: negative for dysuria, hematuria, decreased urinary stream, erectile dysfunction, urethral discharge\",\"MUSCULOSKELETAL: NEGATIVE for significant arthralgias or myalgia\",\"NEURO: NEGATIVE for weakness, dizziness or paresthesias\",\"PSYCHIATRIC: NEGATIVE for changes in mood or affect\"}    OBJECTIVE:   There were no vitals taken for this visit.  EXAM:  {Exam Choices:241150}    {Diagnostic Test Results (Optional):782746::\"Diagnostic Test Results:\",\"Labs reviewed in Epic\"}    ASSESSMENT/PLAN:   {Diag Picklist:655689}    Patient has been advised of split billing requirements and indicates understanding: {YES / NO:763690::\"Yes\"}  COUNSELING:  {MALE COUNSELING MESSAGES:624530::\"Reviewed preventive health counseling, as reflected in patient " "instructions\"}    Estimated body mass index is 27.27 kg/m  as calculated from the following:    Height as of 8/30/19: 1.93 m (6' 4\").    Weight as of 3/18/20: 101.6 kg (224 lb).    {Weight Management Plan (ACO) Complete if BMI is abnormal-  Ages 18-64  BMI >24.9.  Age 65+ with BMI <23 or >30 (Optional):834261}    He reports that he has never smoked. He has never used smokeless tobacco.      Counseling Resources:  ATP IV Guidelines  Pooled Cohorts Equation Calculator  FRAX Risk Assessment  ICSI Preventive Guidelines  Dietary Guidelines for Americans, 2010  USDA's MyPlate  ASA Prophylaxis  Lung CA Screening    MONA RODRIGUEZ MD  [facility]  "

## 2021-03-03 NOTE — PROGRESS NOTES
"SUBJECTIVE:   CC: Teddy Shah is an 50 year old male who presents for preventative health visit.     Patient has been advised of split billing requirements and indicates understanding: Yes  Healthy Habits:     Getting at least 3 servings of Calcium per day:  NO    Bi-annual eye exam:  Yes    Dental care twice a year:  Yes    Sleep apnea or symptoms of sleep apnea:  Excessive snoring    Diet:  Regular (no restrictions)    Frequency of exercise:  2-3 days/week    Duration of exercise:  45-60 minutes    Taking medications regularly:  Yes    Medication side effects:  None    PHQ-2 Total Score: 0    Additional concerns today:  Yes    Biometric form - we did this previously but he does not need one today.      Diabetes 2 - checks his glucose levels \"occasionally\" 30 day average 139 and 90 day average 134. Denies symptoms of high or low glucose.  Evaluation and treatment:    Eye exam - advised to set up.    Foot exam - normal 3/18/20.     Urine albumin - negative 8/23/19 - ordered today.   Metformin  mg daily - previously stopped since not needed - I asked him to restart.    Counseled about diet, exercise, controlling weight, checking glucose regularly, self foot checks, various diabetes complications.    Lab Results   Component Value Date    A1C 6.7 03/01/2021    A1C 6.4 03/11/2020    A1C 6.1 08/30/2019    A1C 6.4 01/30/2019    A1C 6.6 04/27/2018     HTN - checking at home sometimes - doesn't recall numbers but he says there were high. Not controlled in clinic.  Evaluation and treatment:   Norvasc 5 mg daily (10 mg caused minor leg swelling) - no side effects.   Losartan 100 mg daily - no side effects.   hydrochlorothiazide 25 mg daily - no side effects.   Add Hydralazine 25 mg bid.   I counseled him about DASH diet.   He snores but does not know about apnea at night - see below.    BP Readings from Last 6 Encounters:   03/03/21 (!) 150/60   03/18/20 132/60   08/30/19 (!) 142/64   02/05/19 137/79   04/30/18 " 138/86   08/04/17 138/78       Last Comprehensive Metabolic Panel:  Sodium   Date Value Ref Range Status   03/01/2021 138 133 - 144 mmol/L Final     Potassium   Date Value Ref Range Status   03/01/2021 4.3 3.4 - 5.3 mmol/L Final     Chloride   Date Value Ref Range Status   03/01/2021 103 94 - 109 mmol/L Final     Carbon Dioxide   Date Value Ref Range Status   03/01/2021 33 (H) 20 - 32 mmol/L Final     Anion Gap   Date Value Ref Range Status   03/01/2021 2 (L) 3 - 14 mmol/L Final     Glucose   Date Value Ref Range Status   03/01/2021 106 (H) 70 - 99 mg/dL Final     Comment:     Fasting specimen     Urea Nitrogen   Date Value Ref Range Status   03/01/2021 18 7 - 30 mg/dL Final     Creatinine   Date Value Ref Range Status   03/01/2021 1.03 0.66 - 1.25 mg/dL Final     GFR Estimate   Date Value Ref Range Status   03/01/2021 83 >60 mL/min/[1.73_m2] Final     Comment:     Non  GFR Calc  Starting 12/18/2018, serum creatinine based estimated GFR (eGFR) will be   calculated using the Chronic Kidney Disease Epidemiology Collaboration   (CKD-EPI) equation.       Calcium   Date Value Ref Range Status   03/01/2021 9.8 8.5 - 10.1 mg/dL Final     Dyslipidemia - No history of CAD, CVA, PAD. But has diabetes. There is FH of MI.   Evaluation and treatment:   Zocor 20 mg qd - some mucle cramps but tolerable. I asked him to switch to Lipitor 20 mg daily.   Fish Oil 1 gm qd - has not been taking    Co Q 10 qd -     The 10-year ASCVD risk score (Como TAVO Jr., et al., 2013) is: 16.9%    Values used to calculate the score:      Age: 50 years      Sex: Male      Is Non- : No      Diabetic: Yes      Tobacco smoker: No      Systolic Blood Pressure: 158 mmHg      Is BP treated: Yes      HDL Cholesterol: 33 mg/dL      Total Cholesterol: 190 mg/dL    Recent Labs   Lab Test 03/01/21  1511 08/23/19  0741 05/12/15  0000 05/12/15 03/12/15  0820   CHOL 190 148   < > 181 147   HDL 33* 36*   < > 35 31*   LDL 93 81    < > 94 58   TRIG 322* 153*   < > 260 288*   CHOLHDLRATIO  --   --   --  5.2 4.7    < > = values in this interval not displayed.     GERD -  Evaluation and treatment:   Prilosec 40 mg qd - not needing it lately.    ED - erections have been poor for a while. They are unreliable. Libido is fine.  Evaluation and treatment:    No contraindications to PDE5 inhibitors.   I asked him to try generic Sildenafil - side effects discussed.    Overweight, snoring -   Evaluation and treatment:    diet and exercise discussed.   I am referring him to sleep specialist.    Body mass index is 26.96 kg/m .     Wt Readings from Last 5 Encounters:   03/03/21 99.8 kg (220 lb)   03/18/20 101.6 kg (224 lb)   08/30/19 100.2 kg (221 lb)   02/05/19 102.5 kg (226 lb)   04/30/18 102.1 kg (225 lb)     Preventive - we gave him Shingrix in clinic - he has looked up his insurance and was informed they cover it.     Immunization History   Administered Date(s) Administered     Flu, Unspecified 11/07/2020     Influenza Intranasal Vaccine 4 valent 12/12/2014     Influenza Quad, Recombinant, p-free (RIV4) 10/28/2019     Influenza Vaccine IM > 6 months Valent IIV4 01/09/2018     Influenza,INJ,MDCK,PF,Quad >4yrs 10/02/2020     Pneumococcal 23 valent 10/28/2019     TDAP Vaccine (Adacel) 06/30/2010     Zoster vaccine recombinant adjuvanted (SHINGRIX) 03/03/2021     -STD screen: declines    - Colon CA screen: there is family history - Colonoscopy 5/26/16 inflammatory polyp - repeat in 5 years.    - Prostate CA screen: Discussed controversy about screening.     PSA   Date Value Ref Range Status   03/01/2021 1.71 0 - 4 ug/L Final     Comment:     Assay Method:  Chemiluminescence using Siemens Vista analyzer       SH:    Marital status:  - she lives in [place]. It works out well for them.  Kids: 3  Employment: sales  Exercise: hockey once per week, motorcycling, treadmill  Tobacco: no  Etoh: occ  Recreational drugs: no  Caffeine: monster  daily    FH:    Dad passed away age 47 due to MI      Today's PHQ-2 Score:   PHQ-2 ( 1999 Pfizer) 3/3/2021   Q1: Little interest or pleasure in doing things -   Q2: Feeling down, depressed or hopeless -   PHQ-2 Score -   Q1: Little interest or pleasure in doing things -   Q2: Feeling down, depressed or hopeless -   PHQ-2 Score Incomplete       Abuse: Current or Past(Physical, Sexual or Emotional)- No  Do you feel safe in your environment? Yes    Have you ever done Advance Care Planning? (For example, a Health Directive, POLST, or a discussion with a medical provider or your loved ones about your wishes): No, advance care planning information given to patient to review.  Patient declined advance care planning discussion at this time.    Social History     Tobacco Use     Smoking status: Never Smoker     Smokeless tobacco: Never Used     Tobacco comment: Lives in smoke free household   Substance Use Topics     Alcohol use: Yes     Comment: occasional         Alcohol Use 8/30/2019   Prescreen: >3 drinks/day or >7 drinks/week? No   Prescreen: >3 drinks/day or >7 drinks/week? -       Last PSA:   PSA   Date Value Ref Range Status   03/01/2021 1.71 0 - 4 ug/L Final     Comment:     Assay Method:  Chemiluminescence using Siemens Vista analyzer       Reviewed orders with patient. Reviewed health maintenance and updated orders accordingly - Yes      Reviewed and updated as needed this visit by clinical staff  Tobacco  Allergies    Med Hx  Surg Hx  Fam Hx  Soc Hx        Reviewed and updated as needed this visit by Provider                    Review of Systems   Constitutional: Negative for chills and fever.   HENT: Negative for congestion, ear pain, hearing loss and sore throat.    Eyes: Negative for pain and visual disturbance.   Respiratory: Positive for shortness of breath. Negative for cough.    Cardiovascular: Negative for chest pain, palpitations and peripheral edema.   Gastrointestinal: Negative for abdominal pain,  "constipation, diarrhea, heartburn, hematochezia and nausea.   Genitourinary: Positive for frequency. Negative for discharge, dysuria, genital sores, hematuria, impotence and urgency.   Musculoskeletal: Negative for arthralgias, joint swelling and myalgias.   Skin: Negative for rash.   Neurological: Negative for dizziness, weakness, headaches and paresthesias.   Psychiatric/Behavioral: Negative for mood changes. The patient is nervous/anxious.      OBJECTIVE:   BP (!) 150/60   Pulse 80   Temp 96.2  F (35.7  C) (Tympanic)   Ht 1.924 m (6' 3.75\")   Wt 99.8 kg (220 lb)   BMI 26.96 kg/m      Physical Exam  GENERAL: healthy, alert and no distress  EYES: Eyes grossly normal to inspection, PERRL and conjunctivae and sclerae normal  HENT: ear canals and TM's normal, nose and mouth without ulcers or lesions  NECK: no adenopathy, no asymmetry, masses, or scars and thyroid normal to palpation  RESP: lungs clear to auscultation - no rales, rhonchi or wheezes  CV: regular rate and rhythm, normal S1 S2, no S3 or S4, no murmur, click or rub, no peripheral edema and peripheral pulses strong  ABDOMEN: soft, nontender, no hepatosplenomegaly, no masses and bowel sounds normal  MS: no gross musculoskeletal defects noted, no edema  SKIN: no suspicious lesions or rashes  NEURO: Normal strength and tone, mentation intact and speech normal  PSYCH: mentation appears normal, affect normal/bright  Foot exam: Both feet appear normal by inspection. No calluses or other lesions. DP pluses are normal. The tips of the toes are warm with good capillary refill. Sensation intact per monofilament.        ASSESSMENT/PLAN:     COUNSELING:   Reviewed preventive health counseling, as reflected in patient instructions       Regular exercise       Healthy diet/nutrition    Estimated body mass index is 27.27 kg/m  as calculated from the following:    Height as of 8/30/19: 1.93 m (6' 4\").    Weight as of 3/18/20: 101.6 kg (224 lb).     Weight management " plan: Discussed healthy diet and exercise guidelines    He reports that he has never smoked. He has never used smokeless tobacco.    Assessment and Plan - Decision Making    1. Routine general medical examination at a health care facility    Results of today's exam given to patient verbally along with age appropriate preventive measures. Written instructions reviewed and handed to the patient.      2. Type 2 diabetes mellitus without complication, without long-term current use of insulin (H)    Per HPI    - Albumin Random Urine Quantitative with Creat Ratio  - FOOT EXAM    3. Dyslipidemia    Per HPI    - atorvastatin (LIPITOR) 20 MG tablet; Take 1 tablet (20 mg) by mouth daily In 90 days he will switch from Simvastatin to Atorvastatin.  Dispense: 90 tablet; Refill: 2    4. Essential hypertension with goal blood pressure less than 140/90    Per HPI    - hydrochlorothiazide (HYDRODIURIL) 25 MG tablet; Take 1 tablet (25 mg) by mouth daily  Dispense: 90 tablet; Refill: 3  - amLODIPine (NORVASC) 5 MG tablet; Take 1 tablet (5 mg) by mouth daily  Dispense: 90 tablet; Refill: 3  - losartan (COZAAR) 100 MG tablet; Take 1 tablet (100 mg) by mouth daily  Dispense: 90 tablet; Refill: 3  - hydrALAZINE (APRESOLINE) 25 MG tablet; Take 1 tablet (25 mg) by mouth 2 times daily  Dispense: 180 tablet; Refill: 3      5. Erectile dysfunction, unspecified erectile dysfunction type    Per HPI    - sildenafil (REVATIO) 20 MG tablet; Take 2-5 tablets at once as needed, max once per day.  Dispense: 30 tablet; Refill: 3    6. Snoring    Per [facility]    - SLEEP EVALUATION & MANAGEMENT REFERRAL - [facility]  [phone] (Age 15 and up); Future      Written instructions given as follows:    Patient Instructions   1. Look up the DASH diet.    2. Keep up with your exercise - try to get down to 205-210#.    3. Avoid the sun or otherwise use sun screen.    4. See the dentist and eye doctor regularly.     5. After you  run out of your Simvastatin, change to Lipitor (Atorvastatin) 20 mg daily - more potent.    6. Try generic Viagra as needed. It works better on empty stomach. Try low dose 1st and go up if not effective, up to 5 at a time. Possible side effects may be low blood pressure, dizziness, nasal congestion. Rare reports of vision or hearing problems.     7. Set up the sleep appointment - see below for phone number.    8. Check the blood pressure and heart rate daily - give those numbers to me in a few weeks.

## 2021-03-24 ENCOUNTER — IMMUNIZATION (OUTPATIENT)
Dept: PEDIATRICS | Facility: CLINIC | Age: 51
End: 2021-03-24
Payer: COMMERCIAL

## 2021-03-24 PROCEDURE — 0001A PR COVID VAC PFIZER DIL RECON 30 MCG/0.3 ML IM: CPT

## 2021-03-24 PROCEDURE — 91300 PR COVID VAC PFIZER DIL RECON 30 MCG/0.3 ML IM: CPT

## 2021-04-02 ENCOUNTER — MYC MEDICAL ADVICE (OUTPATIENT)
Dept: FAMILY MEDICINE | Facility: CLINIC | Age: 51
End: 2021-04-02

## 2021-04-02 ENCOUNTER — VIRTUAL VISIT (OUTPATIENT)
Dept: FAMILY MEDICINE | Facility: CLINIC | Age: 51
End: 2021-04-02
Payer: COMMERCIAL

## 2021-04-02 DIAGNOSIS — I10 ESSENTIAL HYPERTENSION WITH GOAL BLOOD PRESSURE LESS THAN 140/90: Primary | ICD-10-CM

## 2021-04-02 PROCEDURE — 99213 OFFICE O/P EST LOW 20 MIN: CPT | Mod: 95 | Performed by: FAMILY MEDICINE

## 2021-04-02 NOTE — PROGRESS NOTES
"Vasyl is a 50 year old who is being evaluated via a billable video visit.      How would you like to obtain your AVS? MyChart  If the video visit is dropped, the invitation should be resent by: Text to cell phone: [phone]  Will anyone else be joining your video visit? No      Video Start Time: 2:14 PM    Biometric form - we did this previously but he does not need one today.      Diabetes 2 - checks his glucose levels \"occasionally\" 30 day average 139 and 90 day average 134. Denies symptoms of high or low glucose.  Evaluation and treatment:    Eye exam - advised to set up.    Foot exam - normal 3/18/20.     Urine albumin - negative 8/23/19 - ordered today.   Metformin  mg daily - previously stopped since not needed - I asked him to restart.    Counseled about diet, exercise, controlling weight, checking glucose regularly, self foot checks, various diabetes complications.    Lab Results   Component Value Date    A1C 6.7 03/01/2021    A1C 6.4 03/11/2020    A1C 6.1 08/30/2019    A1C 6.4 01/30/2019    A1C 6.6 04/27/2018     HTN - checking at home sometimes - he sent numbers via My Chart - was 140's systolic but the last 2 were around 119/64. doesn't recall numbers but he says there were high. Not controlled in clinic.  Evaluation and treatment:   Norvasc 5 mg daily (10 mg caused minor leg swelling) - no side effects.   Losartan 100 mg daily - no side effects.   hydrochlorothiazide 25 mg daily - no side effects.   Hydralazine 25 mg bid - no side effects.   I counseled him about DASH diet.   He snores - see below.     BP Readings from Last 6 Encounters:   03/03/21 (!) 150/60   03/18/20 132/60   08/30/19 (!) 142/64   02/05/19 137/79   04/30/18 138/86   08/04/17 138/78       Last Comprehensive Metabolic Panel:  Sodium   Date Value Ref Range Status   03/01/2021 138 133 - 144 mmol/L Final     Potassium   Date Value Ref Range Status   03/01/2021 4.3 3.4 - 5.3 mmol/L Final     Chloride   Date Value Ref Range Status "   03/01/2021 103 94 - 109 mmol/L Final     Carbon Dioxide   Date Value Ref Range Status   03/01/2021 33 (H) 20 - 32 mmol/L Final     Anion Gap   Date Value Ref Range Status   03/01/2021 2 (L) 3 - 14 mmol/L Final     Glucose   Date Value Ref Range Status   03/01/2021 106 (H) 70 - 99 mg/dL Final     Comment:     Fasting specimen     Urea Nitrogen   Date Value Ref Range Status   03/01/2021 18 7 - 30 mg/dL Final     Creatinine   Date Value Ref Range Status   03/01/2021 1.03 0.66 - 1.25 mg/dL Final     GFR Estimate   Date Value Ref Range Status   03/01/2021 83 >60 mL/min/[1.73_m2] Final     Comment:     Non  GFR Calc  Starting 12/18/2018, serum creatinine based estimated GFR (eGFR) will be   calculated using the Chronic Kidney Disease Epidemiology Collaboration   (CKD-EPI) equation.       Calcium   Date Value Ref Range Status   03/01/2021 9.8 8.5 - 10.1 mg/dL Final     Dyslipidemia - No history of CAD, CVA, PAD. But has diabetes. There is FH of MI.   Evaluation and treatment:   Zocor 20 mg qd - some mucle cramps but tolerable. I asked him to switch to Lipitor 20 mg daily.   Fish Oil 1 gm qd - has not been taking    Co Q 10 qd -     The 10-year ASCVD risk score (Crossnorepardeep VO Jr., et al., 2013) is: 15.5%    Values used to calculate the score:      Age: 50 years      Sex: Male      Is Non- : No      Diabetic: Yes      Tobacco smoker: No      Systolic Blood Pressure: 150 mmHg      Is BP treated: Yes      HDL Cholesterol: 33 mg/dL      Total Cholesterol: 190 mg/dL    Recent Labs   Lab Test 03/01/21  1511 08/23/19  0741 05/12/15  0000 05/12/15 03/12/15  0820   CHOL 190 148   < > 181 147   HDL 33* 36*   < > 35 31*   LDL 93 81   < > 94 58   TRIG 322* 153*   < > 260 288*   CHOLHDLRATIO  --   --   --  5.2 4.7    < > = values in this interval not displayed.     GERD -  Evaluation and treatment:   Prilosec 40 mg qd - not needing it lately.    ED - erections have been poor for a while. They are  unreliable. Libido is fine.  Evaluation and treatment:    No contraindications to PDE5 inhibitors.   I asked him to try generic Sildenafil - side effects discussed.    Overweight, snoring -   Evaluation and treatment:    diet and exercise discussed.   He snores but does not know about apnea at night. Previously referred to sleep specialist.    There is no height or weight on file to calculate BMI.     Wt Readings from Last 5 Encounters:   03/03/21 99.8 kg (220 lb)   03/18/20 101.6 kg (224 lb)   08/30/19 100.2 kg (221 lb)   02/05/19 102.5 kg (226 lb)   04/30/18 102.1 kg (225 lb)     Preventive - we gave him Shingrix in clinic - he has looked up his insurance and was informed they cover it.     Immunization History   Administered Date(s) Administered     COVID-19,PF,Pfizer 03/24/2021     Flu, Unspecified 11/07/2020     Influenza Intranasal Vaccine 4 valent 12/12/2014     Influenza Quad, Recombinant, p-free (RIV4) 10/28/2019     Influenza Vaccine IM > 6 months Valent IIV4 01/09/2018     Influenza,INJ,MDCK,PF,Quad >4yrs 10/02/2020     Pneumococcal 23 valent 10/28/2019     TDAP Vaccine (Adacel) 06/30/2010     Zoster vaccine recombinant adjuvanted (SHINGRIX) 03/03/2021     -STD screen: declines    - Colon CA screen: there is family history - Colonoscopy 5/26/16 inflammatory polyp - repeat in 5 years.    - Prostate CA screen: Discussed controversy about screening.     PSA   Date Value Ref Range Status   03/01/2021 1.71 0 - 4 ug/L Final     Comment:     Assay Method:  Chemiluminescence using Siemens Vista analyzer     SH:    Marital status:  - she lives in [place]. It works out well for them.  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    On video, in NAD  Assessment and Plan - Decision Making    1.  Essential hypertension with goal blood pressure less than 140/90    Per HPI        Written instructions given as follows:    Patient Instructions   Keep up the good work.    See you in 6 months by video for follow up.     Video-Visit Details    Type of service:  Video Visit    Video End Time:2:23 PM    Originating Location (pt. Location): Home    Distant Location (provider location):  [facility]     Platform used for Video Visit: [employer]

## 2021-04-13 ENCOUNTER — MYC MEDICAL ADVICE (OUTPATIENT)
Dept: FAMILY MEDICINE | Facility: CLINIC | Age: 51
End: 2021-04-13

## 2021-04-14 ENCOUNTER — OFFICE VISIT (OUTPATIENT)
Dept: PEDIATRICS | Facility: CLINIC | Age: 51
End: 2021-04-14
Attending: INTERNAL MEDICINE
Payer: COMMERCIAL

## 2021-04-14 PROCEDURE — 0002A PR COVID VAC PFIZER DIL RECON 30 MCG/0.3 ML IM: CPT

## 2021-04-14 PROCEDURE — 91300 PR COVID VAC PFIZER DIL RECON 30 MCG/0.3 ML IM: CPT

## 2021-04-30 ASSESSMENT — MIFFLIN-ST. JEOR: SCORE: 1931.73

## 2021-04-30 NOTE — PROGRESS NOTES
Teddy Shah is a 50 year old who is being evaluated via a billable video visit.      How would you like to obtain your AVS? MyChart  If the video visit is dropped, the invitation should be resent by: Send to e-mail at: [email or web address]  Will anyone else be joining your video visit? Fara Perez    Video Start Time: 1:03 PM     Video-Visit Details    Type of service:  Video Visit    Video End Time:1:31 PM    Originating Location (pt. Location): Home    Distant Location (provider location):  @[email or web address]@     Platform used for Video Visit: [facility]         Sleep Consultation:    Date on this visit: 5/3/2021    Teddy Shah is sent by Levi Oliver for a sleep consultation regarding snoring.    Primary Physician: Hasmukh Vick     Chief Complaint   Patient presents with     Consult        Teddy goes to bed at 10:30 PM during the week. One night he goes to bed at 11-12 because he plays hockey. He gets up at 7:00 AM with an alarm.  Teddy denies difficulty falling asleep.  He wakes up >2 times a night without falling back to sleep. He will put [employer] because he has trouble falling back to sleep. He has problems turning his brain off. He sometimes will have an elevated pulse when he wakes up.     On weekends, Teddy goes to sleep at 10:30 PM.  He wakes up at same time.He will sometimes watch TV in bed until 8-9 AM.    He is somewhat resistant to sleep hygiene suggestions.     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     Teddy does snore snoring is very loud. Patient does not have a regular bed partner. His wife lives in [place]. He does have witnessed apneas. Patient sleeps on his back <<  side and stomach. He denies no morning headaches or restless legs.     Teddy denies any sleep walking, sleep talking and dream enactment.    Teddy denies frequent reflux at night.       Patient's [place] Sleepiness score 6/24 inconsistent  with severe daytime sleepiness.  He denies significant fatigue.     Teddy naps 1-2 times per month on linch break. He takes rare inadvertant naps.  He denies dozing while driving. He uses 1 liter of sodas/day. Last caffeine intake is usually before dinner.      Recent Labs   Lab Test 03/01/21  1511 03/11/20  0709    141   POTASSIUM 4.3 4.0   CHLORIDE 103 104   CO2 33* 29   ANIONGAP 2* 8   * 143*   BUN 18 14   CR 1.03 1.02   ZENOBIA 9.8 9.4         Allergies:    No Known Allergies    Medications:    Current Outpatient Medications   Medication Sig Dispense Refill     ALLEGRA ALLERGY 180 MG tablet TAKE 1 TABLET DAILY AS NEEDED FOR ALLERGIES 90 tablet 1     amLODIPine (NORVASC) 5 MG tablet Take 1 tablet (5 mg) by mouth daily 90 tablet 3     [START ON 6/1/2021] atorvastatin (LIPITOR) 20 MG tablet Take 1 tablet (20 mg) by mouth daily In 90 days he will switch from Simvastatin to Atorvastatin. 90 tablet 2     CINNAMON PO        Coenzyme Q10 (COQ-10 PO) Take 200 mg by mouth daily        hydrALAZINE (APRESOLINE) 25 MG tablet Take 1 tablet (25 mg) by mouth 2 times daily 180 tablet 3     hydrochlorothiazide (HYDRODIURIL) 25 MG tablet Take 1 tablet (25 mg) by mouth daily 90 tablet 3     losartan (COZAAR) 100 MG tablet Take 1 tablet (100 mg) by mouth daily 90 tablet 3     Multiple Vitamins-Minerals (FIGUEROA MULTI MEN) TABS Take 2 tablets by mouth daily       sildenafil (REVATIO) 20 MG tablet Take 2-5 tablets at once as needed, max once per day. 30 tablet 3       Problem List:  Patient Active Problem List    Diagnosis Date Noted     Diabetes mellitus, type 2 (H) 05/02/2021     Priority: Medium     Essential hypertension with goal blood pressure less than 140/90 07/13/2016     Priority: Medium     Hyperlipidemia LDL goal <130 10/31/2010     Priority: Medium     Seasonal allergic rhinitis, unspecified chronicity, unspecified trigger 04/24/2018     Priority: Low     Hx of Epi-LASIK 2008 10/22/2013     Priority: Low         Past Medical/Surgical History:  Past Medical History:   Diagnosis Date     Adjustment disorder with anxiety 04/12/2013     Arthritis      Closed fracture of humerus 07/08/2010     Delayed union of fracture 07/08/2010     Diabetes mellitus, type 2 (H) 5/2/2021     HTN (hypertension)      Hypercholesteremia      Past Surgical History:   Procedure Laterality Date     COLONOSCOPY WITH CO2 INSUFFLATION N/A 05/26/2016    Procedure: COLONOSCOPY WITH CO2 INSUFFLATION;  Surgeon: Gerber Fitzpatrick MD;  Location: [facility]     ESOPHAGOSCOPY, GASTROSCOPY, DUODENOSCOPY (EGD), COMBINED  07/15/2014    Procedure: COMBINED ESOPHAGOSCOPY, GASTROSCOPY, DUODENOSCOPY (EGD), BIOPSY SINGLE OR MULTIPLE;  Surgeon: Teddy Grimes MD;  Location: [facility]     LASIK  2008    Epi-LASIK due to thin cornea     ORTHOPEDIC SURGERY  01/04/2010    ORIF humerus       Social History:  Social History     Socioeconomic History     Marital status:      Spouse name: Not on file     Number of children: 2     Years of education: Not on file     Highest education level: Not on file   Occupational History     Occupation: Global    Social Needs     Financial resource strain: Not on file     Food insecurity     Worry: Not on file     Inability: Not on file     Transportation needs     Medical: Not on file     Non-medical: Not on file   Tobacco Use     Smoking status: Never Smoker     Smokeless tobacco: Never Used     Tobacco comment: Lives in smoke free household   Substance and Sexual Activity     Alcohol use: Yes     Comment: occasional     Drug use: No     Sexual activity: Yes     Partners: Female   Lifestyle     Physical activity     Days per week: Not on file     Minutes per session: Not on file     Stress: Not on file   Relationships     Social connections     Talks on phone: Not on file     Gets together: Not on file     Attends Advent service: Not on file     Active member of club or organization: Not on file      "Attends meetings of clubs or organizations: Not on file     Relationship status: Not on file     Intimate partner violence     Fear of current or ex partner: Not on file     Emotionally abused: Not on file     Physically abused: Not on file     Forced sexual activity: Not on file   Other Topics Concern     Parent/sibling w/ CABG, MI or angioplasty before 65F 55M? Yes     Comment: Father   Social History Narrative     Not on file       Family History:  Family History   Problem Relation Age of Onset     Heart Disease Father         mi at 41yo.     Hypertension Father      Coronary Artery Disease Father      Emphysema Mother      Hypertension Sister      Cancer Other         colon cancer - paternal side 3 cousins and uncle     Diabetes No family hx of      Cerebrovascular Disease No family hx of      Thyroid Disease No family hx of      Glaucoma No family hx of      Macular Degeneration No family hx of        Review of Systems:  A complete review of systems reviewed by me is negative with the exeption of what has been mentioned in the history of present illness.  CONSTITUTIONAL:  NEGATIVE for  night sweats  EYES:  POSITIVE for double vision  ENT:  POSITIVE for  sore throat  CARDIAC:  NEGATIVE for  fluttering in chest  NEUROLOGIC:  NEGATIVE for  weakness or numbness in the arms or legs  DERMATOLOGIC:  NEGATIVE for  rashes  PULMONARY:  NEGATIVE for  SOB with activity  GASTROINTESTINAL:  NEGATIVE for  loose or watery stools and constipation  GENITOURINARY:  NEGATIVE for  urinating more frequently than usual  MUSCULOSKELETAL:  POSITIVE for  bone or joint pain  ENDOCRINE:  NEGATIVE for  Diabetes   LYMPHATIC:  NEGATIVE for  swollen lymph nodes    Physical Examination:  Vitals: Ht 1.93 m (6' 4\")   Wt 97.5 kg (215 lb)   BMI 26.17 kg/m    BMI= Body mass index is 26.17 kg/m .      [place] Total Score 5/1/2021   Total score - [place] 6     DOMINGUEZ 11    SpO2 Readings from Last 4 Encounters:   02/05/19 98%   04/30/18 100%   08/04/17 " "97%   07/28/16 99%     GENERAL APPEARANCE: alert and no distress  EYES: Eyes grossly normal to inspection  HENT: mouth without ulcers or lesions  NECK: ?17.5\",  generous size  LUNGS: no shortness of breath , cough  NEURO: mentation intact, speech normal and cranial nerves 2-12 appear intact  PSYCH: affect normal/bright  Mallampati Class: 2       Impression/Plan:    Loud snoring, witnessed apneas, sleep maintenance difficulties, large neck. Comorbid hypertension, diabetes mellitus.   - Patient appears to be a good candidate for Home Sleep Test STOPBANG 6    Polysomnography reviewed.  Obstructive sleep apnea reviewed.  Complications of untreated sleep apnea were reviewed.    I spent 25 minutes with patient including counseling, and 10 minutes with chart review, and documentation      CC: Levi Oliver        "

## 2021-04-30 NOTE — PATIENT INSTRUCTIONS

## 2021-05-02 PROBLEM — E11.9 DIABETES MELLITUS, TYPE 2 (H): Status: ACTIVE | Noted: 2021-05-02

## 2021-05-02 PROBLEM — E11.9 DIABETES MELLITUS, TYPE 2 (H): Chronic | Status: ACTIVE | Noted: 2021-05-02

## 2021-05-03 ENCOUNTER — VIRTUAL VISIT (OUTPATIENT)
Dept: SLEEP MEDICINE | Facility: CLINIC | Age: 51
End: 2021-05-03
Attending: FAMILY MEDICINE
Payer: COMMERCIAL

## 2021-05-03 VITALS — HEIGHT: 76 IN | BODY MASS INDEX: 26.18 KG/M2 | WEIGHT: 215 LBS

## 2021-05-03 DIAGNOSIS — G47.9 DISTURBANCE IN SLEEP BEHAVIOR: ICD-10-CM

## 2021-05-03 DIAGNOSIS — R06.83 SNORING: Primary | ICD-10-CM

## 2021-05-03 DIAGNOSIS — E11.9 TYPE 2 DIABETES MELLITUS WITHOUT COMPLICATION, WITHOUT LONG-TERM CURRENT USE OF INSULIN (H): ICD-10-CM

## 2021-05-03 DIAGNOSIS — I10 ESSENTIAL HYPERTENSION WITH GOAL BLOOD PRESSURE LESS THAN 140/90: Chronic | ICD-10-CM

## 2021-05-03 PROBLEM — J30.2 SEASONAL ALLERGIC RHINITIS: Chronic | Status: ACTIVE | Noted: 2018-04-24

## 2021-05-03 PROCEDURE — 99203 OFFICE O/P NEW LOW 30 MIN: CPT | Mod: 95 | Performed by: INTERNAL MEDICINE

## 2021-05-03 SDOH — ECONOMIC STABILITY: FOOD INSECURITY: WITHIN THE PAST 12 MONTHS, THE FOOD YOU BOUGHT JUST DIDN'T LAST AND YOU DIDN'T HAVE MONEY TO GET MORE.: NOT ASKED

## 2021-05-03 SDOH — ECONOMIC STABILITY: TRANSPORTATION INSECURITY
IN THE PAST 12 MONTHS, HAS THE LACK OF TRANSPORTATION KEPT YOU FROM MEDICAL APPOINTMENTS OR FROM GETTING MEDICATIONS?: NOT ASKED

## 2021-05-03 SDOH — ECONOMIC STABILITY: INCOME INSECURITY: HOW HARD IS IT FOR YOU TO PAY FOR THE VERY BASICS LIKE FOOD, HOUSING, MEDICAL CARE, AND HEATING?: NOT ASKED

## 2021-05-03 SDOH — ECONOMIC STABILITY: FOOD INSECURITY: WITHIN THE PAST 12 MONTHS, YOU WORRIED THAT YOUR FOOD WOULD RUN OUT BEFORE YOU GOT MONEY TO BUY MORE.: NOT ASKED

## 2021-05-03 SDOH — ECONOMIC STABILITY: TRANSPORTATION INSECURITY
IN THE PAST 12 MONTHS, HAS LACK OF TRANSPORTATION KEPT YOU FROM MEETINGS, WORK, OR FROM GETTING THINGS NEEDED FOR DAILY LIVING?: NOT ASKED

## 2021-05-12 ENCOUNTER — TELEPHONE (OUTPATIENT)
Dept: SLEEP MEDICINE | Facility: CLINIC | Age: 51
End: 2021-05-12

## 2021-05-12 NOTE — TELEPHONE ENCOUNTER
Attempted to reach Vasyl today to schedule home sleep test, along with return virtual visit with Dr. Gray for test results. No answer. [facility]    Subha GANDARA CMA, [facility], 5/12/2021 11:52 AM

## 2021-05-12 NOTE — TELEPHONE ENCOUNTER
----- Message from Christi Coe CMA sent at 5/4/2021  8:01 AM CDT -----  Regarding: schedule HST and return visit Dr. Gray  Insurance: BCBS/BCBS OUT OF STATE

## 2021-05-18 NOTE — TELEPHONE ENCOUNTER
2nd attempt:    Attempted to reach Vasyl today to schedule home sleep test, along with return virtual visit with Dr. Gray for test results. No answer. [facility]    Subha GANDARA CMA, [facility], 5/18/2021 3:51 PM

## 2021-05-19 NOTE — TELEPHONE ENCOUNTER
3rd attempt:     Attempted to reach Vasyl today to schedule home sleep test, along with return virtual visit with Dr. Gray for test results. No answer. [facility]      Subha GANDARA CMA, [facility], 5/19/2021 1:11 PM

## 2021-06-11 ENCOUNTER — ALLIED HEALTH/NURSE VISIT (OUTPATIENT)
Dept: NURSING | Facility: CLINIC | Age: 51
End: 2021-06-11
Payer: COMMERCIAL

## 2021-06-11 DIAGNOSIS — Z23 NEED FOR VACCINATION: Primary | ICD-10-CM

## 2021-06-11 PROCEDURE — 99207 PR NO CHARGE NURSE ONLY: CPT

## 2021-06-11 PROCEDURE — 90471 IMMUNIZATION ADMIN: CPT

## 2021-06-11 PROCEDURE — 90750 HZV VACC RECOMBINANT IM: CPT

## 2021-07-04 ENCOUNTER — HEALTH MAINTENANCE LETTER (OUTPATIENT)
Age: 51
End: 2021-07-04

## 2021-10-24 ENCOUNTER — HEALTH MAINTENANCE LETTER (OUTPATIENT)
Age: 51
End: 2021-10-24

## 2021-10-25 ENCOUNTER — TELEPHONE (OUTPATIENT)
Dept: FAMILY MEDICINE | Facility: CLINIC | Age: 51
End: 2021-10-25

## 2021-10-25 DIAGNOSIS — Z12.11 COLON CANCER SCREENING: Primary | ICD-10-CM

## 2021-11-02 DIAGNOSIS — Z11.59 ENCOUNTER FOR SCREENING FOR OTHER VIRAL DISEASES: ICD-10-CM

## 2021-11-18 ENCOUNTER — DOCUMENTATION ONLY (OUTPATIENT)
Dept: LAB | Facility: CLINIC | Age: 51
End: 2021-11-18
Payer: COMMERCIAL

## 2021-11-18 DIAGNOSIS — E11.9 TYPE 2 DIABETES MELLITUS WITHOUT COMPLICATION, WITHOUT LONG-TERM CURRENT USE OF INSULIN (H): Primary | ICD-10-CM

## 2021-11-19 ENCOUNTER — DOCUMENTATION ONLY (OUTPATIENT)
Dept: LAB | Facility: CLINIC | Age: 51
End: 2021-11-19

## 2021-11-19 ENCOUNTER — LAB (OUTPATIENT)
Dept: LAB | Facility: CLINIC | Age: 51
End: 2021-11-19
Payer: COMMERCIAL

## 2021-11-19 DIAGNOSIS — E78.5 DYSLIPIDEMIA: ICD-10-CM

## 2021-11-19 DIAGNOSIS — E11.9 TYPE 2 DIABETES MELLITUS WITHOUT COMPLICATION, WITHOUT LONG-TERM CURRENT USE OF INSULIN (H): ICD-10-CM

## 2021-11-19 DIAGNOSIS — E78.5 DYSLIPIDEMIA: Primary | ICD-10-CM

## 2021-11-19 LAB
CHOLEST SERPL-MCNC: 145 MG/DL
FASTING STATUS PATIENT QL REPORTED: YES
HBA1C MFR BLD: 6.5 % (ref 0–5.6)
HDLC SERPL-MCNC: 30 MG/DL
HOLD SPECIMEN: NORMAL
LDLC SERPL CALC-MCNC: 72 MG/DL
NONHDLC SERPL-MCNC: 115 MG/DL
TRIGL SERPL-MCNC: 217 MG/DL

## 2021-11-19 PROCEDURE — 83036 HEMOGLOBIN GLYCOSYLATED A1C: CPT

## 2021-11-19 PROCEDURE — 80061 LIPID PANEL: CPT

## 2021-11-19 PROCEDURE — 36415 COLL VENOUS BLD VENIPUNCTURE: CPT

## 2021-11-19 NOTE — PROGRESS NOTES
Vasyl Shah came in this morning to be drawn for A1C and had request to also be drawn for Lipid profile.      Thanks  Karina

## 2021-11-19 NOTE — PROGRESS NOTES
I added the cholesterol. But I don't see upcoming visit with me.    Please add him by telephone next week. Double book is fine.    Levi Oliver M.D.

## 2021-11-19 NOTE — PROGRESS NOTES
Called Vasyl and scheduled him for a Telephone visit with Dr. Oliver.on 11/30/2021.  Minesh Zapata

## 2021-11-26 ENCOUNTER — LAB (OUTPATIENT)
Dept: LAB | Facility: CLINIC | Age: 51
End: 2021-11-26
Payer: COMMERCIAL

## 2021-11-26 ENCOUNTER — ANESTHESIA EVENT (OUTPATIENT)
Dept: GASTROENTEROLOGY | Facility: CLINIC | Age: 51
End: 2021-11-26
Payer: COMMERCIAL

## 2021-11-26 DIAGNOSIS — Z11.59 ENCOUNTER FOR SCREENING FOR OTHER VIRAL DISEASES: ICD-10-CM

## 2021-11-26 LAB — SARS-COV-2 RNA RESP QL NAA+PROBE: NEGATIVE

## 2021-11-26 PROCEDURE — U0003 INFECTIOUS AGENT DETECTION BY NUCLEIC ACID (DNA OR RNA); SEVERE ACUTE RESPIRATORY SYNDROME CORONAVIRUS 2 (SARS-COV-2) (CORONAVIRUS DISEASE [COVID-19]), AMPLIFIED PROBE TECHNIQUE, MAKING USE OF HIGH THROUGHPUT TECHNOLOGIES AS DESCRIBED BY CMS-2020-01-R: HCPCS

## 2021-11-26 PROCEDURE — U0005 INFEC AGEN DETEC AMPLI PROBE: HCPCS

## 2021-11-26 NOTE — ANESTHESIA PREPROCEDURE EVALUATION
Anesthesia Pre-Procedure Evaluation    Patient: Teddy Shah   MRN: [id number] : 1970        Preoperative Diagnosis: Colon cancer screening [Z12.11]    Procedure : Procedure(s):  COLONOSCOPY          Past Medical History:   Diagnosis Date     Adjustment disorder with anxiety 2013     Arthritis      Closed fracture of humerus 2010     Delayed union of fracture 2010     Diabetes mellitus, type 2 (H) 2021     HTN (hypertension)      Hypercholesteremia       Past Surgical History:   Procedure Laterality Date     COLONOSCOPY WITH CO2 INSUFFLATION N/A 2016    Procedure: COLONOSCOPY WITH CO2 INSUFFLATION;  Surgeon: Gerber Fitzpatrick MD;  Location: [facility]     ESOPHAGOSCOPY, GASTROSCOPY, DUODENOSCOPY (EGD), COMBINED  07/15/2014    Procedure: COMBINED ESOPHAGOSCOPY, GASTROSCOPY, DUODENOSCOPY (EGD), BIOPSY SINGLE OR MULTIPLE;  Surgeon: Teddy Grimes MD;  Location: [facility]     LASIK      Epi-LASIK due to thin cornea     ORTHOPEDIC SURGERY  2010    ORIF humerus      Not on File   Social History     Tobacco Use     Smoking status: Never Smoker     Smokeless tobacco: Never Used     Tobacco comment: Lives in smoke free household   Substance Use Topics     Alcohol use: Yes     Comment: occasional      Wt Readings from Last 1 Encounters:   21 97.5 kg (215 lb)        Anesthesia Evaluation   Pt has had prior anesthetic. Type: General and MAC.    No history of anesthetic complications       ROS/MED HX  ENT/Pulmonary:     (+) allergic rhinitis,     Neurologic:  - neg neurologic ROS     Cardiovascular:     (+) Dyslipidemia hypertension-----    METS/Exercise Tolerance:     Hematologic:  - neg hematologic  ROS     Musculoskeletal:   (+) arthritis,     GI/Hepatic:     (+) bowel prep,     Renal/Genitourinary:  - neg Renal ROS     Endo:     (+) type II DM, Not using insulin,     Psychiatric/Substance Use:     (+) psychiatric history anxiety     Infectious Disease:  - neg  infectious disease ROS     Malignancy:  - neg malignancy ROS     Other:            Physical Exam    Airway        Mallampati: II   TM distance: > 3 FB   Neck ROM: full   Mouth opening: > 3 cm    Respiratory Devices and Support         Dental  no notable dental history         Cardiovascular   cardiovascular exam normal          Pulmonary   pulmonary exam normal                OUTSIDE LABS:  CBC:   Lab Results   Component Value Date    WBC 7.8 03/12/2015    WBC 8.3 12/23/2014    HGB 14.3 03/12/2015    HGB 14.7 12/23/2014    HCT 42.4 03/12/2015    HCT 43.7 12/23/2014     03/12/2015     12/23/2014     BMP:   Lab Results   Component Value Date     03/01/2021     03/11/2020    POTASSIUM 4.3 03/01/2021    POTASSIUM 4.0 03/11/2020    CHLORIDE 103 03/01/2021    CHLORIDE 104 03/11/2020    CO2 33 (H) 03/01/2021    CO2 29 03/11/2020    BUN 18 03/01/2021    BUN 14 03/11/2020    CR 1.03 03/01/2021    CR 1.02 03/11/2020     (H) 03/01/2021     (H) 03/11/2020     COAGS: No results found for: PTT, INR, FIBR  POC: No results found for: BGM, HCG, HCGS  HEPATIC:   Lab Results   Component Value Date    ALBUMIN 4.7 03/01/2021    PROTTOTAL 7.7 07/22/2016    ALT 38 08/23/2019    AST 19 07/22/2016    ALKPHOS 79 07/22/2016    BILITOTAL 0.8 07/22/2016     OTHER:   Lab Results   Component Value Date    A1C 6.5 (H) 11/19/2021    ZENOBIA 9.8 03/01/2021    PHOS 3.3 03/01/2021    TSH 2.42 08/23/2019    T4 0.93 12/23/2014       Anesthesia Plan    ASA Status:  2   NPO Status:  NPO Appropriate    Anesthesia Type: General.     - Airway: Native airway              Consents    Anesthesia Plan(s) and associated risks, benefits, and realistic alternatives discussed. Questions answered and patient/representative(s) expressed understanding.     - Discussed: Risks, Benefits and Alternatives for BOTH SEDATION and the PROCEDURE were discussed     - Discussed with:  Patient         Postoperative Care            Comments:                 Harley Pina, CRNA, APRN CRNA

## 2021-11-29 ENCOUNTER — HOSPITAL ENCOUNTER (OUTPATIENT)
Facility: CLINIC | Age: 51
Discharge: HOME | End: 2021-11-29
Attending: SURGERY | Admitting: SURGERY
Payer: COMMERCIAL

## 2021-11-29 ENCOUNTER — ANESTHESIA (OUTPATIENT)
Dept: GASTROENTEROLOGY | Facility: CLINIC | Age: 51
End: 2021-11-29
Payer: COMMERCIAL

## 2021-11-29 VITALS
BODY MASS INDEX: 26.18 KG/M2 | HEART RATE: 58 BPM | SYSTOLIC BLOOD PRESSURE: 130 MMHG | TEMPERATURE: 97.6 F | OXYGEN SATURATION: 99 % | HEIGHT: 76 IN | DIASTOLIC BLOOD PRESSURE: 69 MMHG | WEIGHT: 215 LBS | RESPIRATION RATE: 16 BRPM

## 2021-11-29 LAB — COLONOSCOPY: NORMAL

## 2021-11-29 PROCEDURE — 258N000003 HC RX IP 258 OP 636: Performed by: SURGERY

## 2021-11-29 PROCEDURE — 370N000017 HC ANESTHESIA TECHNICAL FEE, PER MIN: Performed by: SURGERY

## 2021-11-29 PROCEDURE — 250N000009 HC RX 250: Performed by: SURGERY

## 2021-11-29 PROCEDURE — 88305 TISSUE EXAM BY PATHOLOGIST: CPT | Mod: TC | Performed by: SURGERY

## 2021-11-29 PROCEDURE — 250N000009 HC RX 250: Performed by: NURSE ANESTHETIST, CERTIFIED REGISTERED

## 2021-11-29 PROCEDURE — 45384 COLONOSCOPY W/LESION REMOVAL: CPT | Mod: PT | Performed by: SURGERY

## 2021-11-29 PROCEDURE — 250N000011 HC RX IP 250 OP 636: Performed by: NURSE ANESTHETIST, CERTIFIED REGISTERED

## 2021-11-29 PROCEDURE — 45384 COLONOSCOPY W/LESION REMOVAL: CPT | Performed by: SURGERY

## 2021-11-29 RX ORDER — PROPOFOL 10 MG/ML
INJECTION, EMULSION INTRAVENOUS PRN
Status: DISCONTINUED | OUTPATIENT
Start: 2021-11-29 | End: 2021-11-29

## 2021-11-29 RX ORDER — ONDANSETRON 2 MG/ML
4 INJECTION INTRAMUSCULAR; INTRAVENOUS
Status: DISCONTINUED | OUTPATIENT
Start: 2021-11-29 | End: 2021-11-29 | Stop reason: HOSPADM

## 2021-11-29 RX ORDER — LIDOCAINE HYDROCHLORIDE 10 MG/ML
INJECTION, SOLUTION EPIDURAL; INFILTRATION; INTRACAUDAL; PERINEURAL PRN
Status: DISCONTINUED | OUTPATIENT
Start: 2021-11-29 | End: 2021-11-29

## 2021-11-29 RX ORDER — GLYCOPYRROLATE 0.2 MG/ML
INJECTION, SOLUTION INTRAMUSCULAR; INTRAVENOUS PRN
Status: DISCONTINUED | OUTPATIENT
Start: 2021-11-29 | End: 2021-11-29

## 2021-11-29 RX ORDER — LIDOCAINE 40 MG/G
CREAM TOPICAL
Status: DISCONTINUED | OUTPATIENT
Start: 2021-11-29 | End: 2021-11-29 | Stop reason: HOSPADM

## 2021-11-29 RX ORDER — PROPOFOL 10 MG/ML
INJECTION, EMULSION INTRAVENOUS CONTINUOUS PRN
Status: DISCONTINUED | OUTPATIENT
Start: 2021-11-29 | End: 2021-11-29

## 2021-11-29 RX ORDER — SODIUM CHLORIDE, SODIUM LACTATE, POTASSIUM CHLORIDE, CALCIUM CHLORIDE 600; 310; 30; 20 MG/100ML; MG/100ML; MG/100ML; MG/100ML
INJECTION, SOLUTION INTRAVENOUS CONTINUOUS
Status: DISCONTINUED | OUTPATIENT
Start: 2021-11-29 | End: 2021-11-29 | Stop reason: HOSPADM

## 2021-11-29 RX ADMIN — LIDOCAINE HYDROCHLORIDE 50 MG: 10 INJECTION, SOLUTION EPIDURAL; INFILTRATION; INTRACAUDAL; PERINEURAL at 10:45

## 2021-11-29 RX ADMIN — PROPOFOL 50 MG: 10 INJECTION, EMULSION INTRAVENOUS at 10:47

## 2021-11-29 RX ADMIN — LIDOCAINE HYDROCHLORIDE 0.1 ML: 10 INJECTION, SOLUTION EPIDURAL; INFILTRATION; INTRACAUDAL; PERINEURAL at 10:26

## 2021-11-29 RX ADMIN — PROPOFOL 50 MG: 10 INJECTION, EMULSION INTRAVENOUS at 10:49

## 2021-11-29 RX ADMIN — PROPOFOL 200 MCG/KG/MIN: 10 INJECTION, EMULSION INTRAVENOUS at 10:45

## 2021-11-29 RX ADMIN — SODIUM CHLORIDE, POTASSIUM CHLORIDE, SODIUM LACTATE AND CALCIUM CHLORIDE: 600; 310; 30; 20 INJECTION, SOLUTION INTRAVENOUS at 10:26

## 2021-11-29 RX ADMIN — PROPOFOL 100 MG: 10 INJECTION, EMULSION INTRAVENOUS at 10:45

## 2021-11-29 RX ADMIN — GLYCOPYRROLATE 0.2 MG: 0.2 INJECTION, SOLUTION INTRAMUSCULAR; INTRAVENOUS at 10:46

## 2021-11-29 ASSESSMENT — MIFFLIN-ST. JEOR: SCORE: 1926.73

## 2021-11-29 NOTE — H&P
"51 year old year old male here for colonoscopy for screening.    Patient Active Problem List   Diagnosis     Hyperlipidemia LDL goal <130     Hx of Epi-LASIK 2008     Essential hypertension with goal blood pressure less than 140/90     Seasonal allergic rhinitis, unspecified chronicity, unspecified trigger     Diabetes mellitus, type 2 (H)       Past Medical History:   Diagnosis Date     Adjustment disorder with anxiety 04/12/2013     Arthritis      Closed fracture of humerus 07/08/2010     Delayed union of fracture 07/08/2010     Diabetes mellitus, type 2 (H) 5/2/2021     HTN (hypertension)      Hypercholesteremia        Past Surgical History:   Procedure Laterality Date     COLONOSCOPY WITH CO2 INSUFFLATION N/A 05/26/2016    Procedure: COLONOSCOPY WITH CO2 INSUFFLATION;  Surgeon: Gerber Fitzpatrick MD;  Location: [facility]     ESOPHAGOSCOPY, GASTROSCOPY, DUODENOSCOPY (EGD), COMBINED  07/15/2014    Procedure: COMBINED ESOPHAGOSCOPY, GASTROSCOPY, DUODENOSCOPY (EGD), BIOPSY SINGLE OR MULTIPLE;  Surgeon: Teddy Grimes MD;  Location: [facility]     LASIK  2008    Epi-LASIK due to thin cornea     ORTHOPEDIC SURGERY  01/04/2010    ORIF humerus       @[facility]@    No current outpatient medications on file.       No Known Allergies    Pt reports that he has never smoked. He has never used smokeless tobacco. He reports current alcohol use. He reports that he does not use drugs.    Exam:  BP (!) 150/66 (BP Location: Right arm)   Pulse 58   Temp 97.6  F (36.4  C) (Oral)   Resp 18   Ht 1.93 m (6' 4\")   Wt 97.5 kg (215 lb)   SpO2 100%   BMI 26.17 kg/m      Awake, Alert OX3  Lungs - CTA bilaterally  CV - RRR, no murmurs, distal pulses intact  Abd - soft, non-distended, non-tender, +BS  Extr - No cyanosis or edema    A/P 51 year old year old male in need of colonoscopy for screening. Risks, benefits, alternatives, and complications were discussed including the possibility of perforation and the patient agreed to " proceed    Karthik Oliver MD

## 2021-11-29 NOTE — ANESTHESIA CARE TRANSFER NOTE
Patient: Teddy Shah    Procedure: Procedure(s):  COLONOSCOPY, WITH LESION EXCISION USING HOT BIOPSY DEVICE       Diagnosis: Colon cancer screening [Z12.11]  Diagnosis Additional Information: No value filed.    Anesthesia Type:   General     Note:    Oropharynx: oropharynx clear of all foreign objects and spontaneously breathing  Level of Consciousness: drowsy  Oxygen Supplementation: room air    Independent Airway: airway patency satisfactory and stable  Dentition: dentition unchanged  Vital Signs Stable: post-procedure vital signs reviewed and stable  Report to RN Given: handoff report given  Patient transferred to: Phase II    Handoff Report: Identifed the Patient, Identified the Reponsible Provider, Reviewed the pertinent medical history, Discussed the surgical course, Reviewed Intra-OP anesthesia mangement and issues during anesthesia, Set expectations for post-procedure period and Allowed opportunity for questions and acknowledgement of understanding      Vitals:  Vitals Value Taken Time   BP     Temp     Pulse     Resp     SpO2         Electronically Signed By: FERNANDO Larry CRNA  November 29, 2021  10:58 AM

## 2021-11-29 NOTE — ANESTHESIA POSTPROCEDURE EVALUATION
Patient: Teddy Shah    Procedure: Procedure(s):  COLONOSCOPY, WITH LESION EXCISION USING HOT BIOPSY DEVICE       Diagnosis:Colon cancer screening [Z12.11]  Diagnosis Additional Information: No value filed.    Anesthesia Type:  General    Note:  Disposition: Outpatient   Postop Pain Control: Uneventful            Sign Out: Well controlled pain   PONV: No   Neuro/Psych: Uneventful            Sign Out: Acceptable/Baseline neuro status   Airway/Respiratory: Uneventful            Sign Out: Acceptable/Baseline resp. status   CV/Hemodynamics: Uneventful            Sign Out: Acceptable CV status; No obvious hypovolemia; No obvious fluid overload   Other NRE: NONE   DID A NON-ROUTINE EVENT OCCUR? No           Last vitals:  Vitals Value Taken Time   /63 11/29/21 1100   Temp     Pulse 56 11/29/21 1100   Resp 16 11/29/21 1100   SpO2 98 % 11/29/21 1113   Vitals shown include unvalidated device data.    Electronically Signed By: FERNANDO Larry CRNA  November 29, 2021  11:14 AM

## 2021-11-30 ENCOUNTER — VIRTUAL VISIT (OUTPATIENT)
Dept: FAMILY MEDICINE | Facility: CLINIC | Age: 51
End: 2021-11-30
Payer: COMMERCIAL

## 2021-11-30 DIAGNOSIS — I10 ESSENTIAL HYPERTENSION WITH GOAL BLOOD PRESSURE LESS THAN 140/90: ICD-10-CM

## 2021-11-30 DIAGNOSIS — E11.9 TYPE 2 DIABETES MELLITUS WITHOUT COMPLICATION, WITHOUT LONG-TERM CURRENT USE OF INSULIN (H): Primary | ICD-10-CM

## 2021-11-30 PROCEDURE — 99214 OFFICE O/P EST MOD 30 MIN: CPT | Mod: 95 | Performed by: FAMILY MEDICINE

## 2021-11-30 NOTE — PROGRESS NOTES
"Vasyl is a 51 year old who is being evaluated via a billable telephone visit.      What phone number would you like to be contacted at? [phone]    How would you like to obtain your AVS? Jamesquinalberto Fallon is a 50 year old who is being evaluated via a billable video visit.      How would you like to obtain your AVS? Jamesquinalberto  If the video visit is dropped, the invitation should be resent by: Text to cell phone: [phone]  Will anyone else be joining your video visit? No      Video Start Time: 2:14 PM    Biometric form - we did this previously but he does not need one today.      Diabetes 2 - checks his glucose levels \"occasionally\" 30 day average 139 and 90 day average 134. Denies symptoms of high or low glucose.  Evaluation and treatment:    Eye exam - advised to set up.    Foot exam - normal 3/3/21.     Urine albumin - negative 3/3/21.   Metformin - previously stopped since not needed - under good control    Counseled about diet, exercise, controlling weight, checking glucose regularly, self foot checks, various diabetes complications.     Lab Results   Component Value Date    A1C 6.5 11/19/2021    A1C 6.7 03/01/2021    A1C 6.4 03/11/2020    A1C 6.1 08/30/2019    A1C 6.4 01/30/2019    A1C 6.6 04/27/2018     HTN - checking at home sometimes - mostly around upper 130's - lower 140's/60's.   Evaluation and treatment:   Norvasc 5 mg daily (10 mg caused minor leg swelling) - no side effects.   Losartan 100 mg daily - no side effects.   hydrochlorothiazide 25 mg daily - no side effects.   Hydralazine 25 mg bid - no side effects.   I counseled him about DASH diet.   He snores - see below.    Even though his home BP is not ideal, we decided not to add more medications.   Focus on lifestyle factors.    BP Readings from Last 6 Encounters:   11/29/21 130/69   03/03/21 (!) 150/60   03/18/20 132/60   08/30/19 (!) 142/64   02/05/19 137/79   04/30/18 138/86       Last Comprehensive Metabolic Panel:  Sodium   Date Value Ref Range " Status   03/01/2021 138 133 - 144 mmol/L Final     Potassium   Date Value Ref Range Status   03/01/2021 4.3 3.4 - 5.3 mmol/L Final     Chloride   Date Value Ref Range Status   03/01/2021 103 94 - 109 mmol/L Final     Carbon Dioxide   Date Value Ref Range Status   03/01/2021 33 (H) 20 - 32 mmol/L Final     Anion Gap   Date Value Ref Range Status   03/01/2021 2 (L) 3 - 14 mmol/L Final     Glucose   Date Value Ref Range Status   03/01/2021 106 (H) 70 - 99 mg/dL Final     Comment:     Fasting specimen     Urea Nitrogen   Date Value Ref Range Status   03/01/2021 18 7 - 30 mg/dL Final     Creatinine   Date Value Ref Range Status   03/01/2021 1.03 0.66 - 1.25 mg/dL Final     GFR Estimate   Date Value Ref Range Status   03/01/2021 83 >60 mL/min/[1.73_m2] Final     Comment:     Non  GFR Calc  Starting 12/18/2018, serum creatinine based estimated GFR (eGFR) will be   calculated using the Chronic Kidney Disease Epidemiology Collaboration   (CKD-EPI) equation.       Calcium   Date Value Ref Range Status   03/01/2021 9.8 8.5 - 10.1 mg/dL Final     Dyslipidemia - No history of CAD, CVA, PAD. But has diabetes. There is FH of MI.   Evaluation and treatment:   Zocor 20 mg qd - some mucle cramps but tolerable. I asked him to switch to Lipitor 20 mg daily.   Fish Oil 1 gm qd - has not been taking    Co Q 10 qd -     The 10-year ASCVD risk score (Charlotte TAVO Jr., et al., 2013) is: 10.4%    Values used to calculate the score:      Age: 51 years      Sex: Male      Is Non- : No      Diabetic: Yes      Tobacco smoker: No      Systolic Blood Pressure: 130 mmHg      Is BP treated: Yes      HDL Cholesterol: 30 mg/dL      Total Cholesterol: 145 mg/dL    Recent Labs   Lab Test 11/19/21  0841 03/01/21  1511 05/13/16  0000 05/12/15  0000 03/12/15  0820   CHOL 145 190   < > 181 147   HDL 30* 33*   < > 35 31*   LDL 72 93   < > 94 58   TRIG 217* 322*   < > 260 288*   CHOLHDLRATIO  --   --   --  5.2 4.7    < > =  values in this interval not displayed.     GERD -  Evaluation and treatment:   Prilosec 40 mg qd - not needing it lately.    ED - erections have been poor for a while. They are unreliable. Libido is fine.  Evaluation and treatment:    No contraindications to PDE5 inhibitors.   I asked him to try generic Sildenafil - side effects discussed.    Overweight, snoring -   Evaluation and treatment:    diet and exercise discussed.   He snores but does not know about apnea at night. Previously referred to sleep specialist.    There is no height or weight on file to calculate BMI.     Wt Readings from Last 5 Encounters:   11/29/21 97.5 kg (215 lb)   04/30/21 97.5 kg (215 lb)   03/03/21 99.8 kg (220 lb)   03/18/20 101.6 kg (224 lb)   08/30/19 100.2 kg (221 lb)     Preventive -     Immunization History   Administered Date(s) Administered     COVID-19,PF,Pfizer (12+ Yrs) 03/24/2021, 04/14/2021     Flu, Unspecified 11/07/2020     Influenza Intranasal Vaccine 4 valent (FluMist) 12/12/2014     Influenza Quad, Recombinant, pf(RIV4) (Flublok) 10/28/2019     Influenza Vaccine IM > 6 months Valent IIV4 (Alfuria,Fluzone) 01/09/2018     Influenza,INJ,MDCK,PF,Quad >4yrs 10/02/2020     Pneumococcal 23 valent 10/28/2019     TDAP Vaccine (Adacel) 06/30/2010     Zoster vaccine recombinant adjuvanted (SHINGRIX) 03/03/2021, 06/11/2021     -STD screen: declines    - Colon CA screen: there is family history - Colonoscopy 11/29/21 - path is pending which will determine when to repeat.    - Prostate CA screen: Discussed controversy about screening.     PSA   Date Value Ref Range Status   03/01/2021 1.71 0 - 4 ug/L Final     Comment:     Assay Method:  Chemiluminescence using Siemens Vista analyzer     SH:    Marital status:  - she lives in [place]. It works out well for them.  Kids: 3  Employment: sales  Exercise: hockey once per week, motorcycling, treadmill  Tobacco: no  Etoh: occ  Recreational drugs: no  Caffeine: monster  daily    FH:    Dad passed away age 47 due to MI      OBJECTIVE:   There were no vitals taken for this visit.    On telephone, in NAD        Assessment and Plan - Decision Making    1. Type 2 diabetes mellitus without complication, without long-term current use of insulin (H)    Per HPI      2. Essential hypertension with goal blood pressure less than 140/90    Per HPI        Written instructions given as follows:    Patient Instructions   Keep up the good work.    See you in June for a physical with fasting labs.      Total time on the phone and reviewing records: 12 min

## 2021-12-01 LAB
PATH REPORT.COMMENTS IMP SPEC: NORMAL
PATH REPORT.COMMENTS IMP SPEC: NORMAL
PATH REPORT.FINAL DX SPEC: NORMAL
PATH REPORT.GROSS SPEC: NORMAL
PATH REPORT.MICROSCOPIC SPEC OTHER STN: NORMAL
PHOTO IMAGE: NORMAL

## 2021-12-01 PROCEDURE — 88305 TISSUE EXAM BY PATHOLOGIST: CPT | Mod: 26 | Performed by: PATHOLOGY

## 2022-01-03 ENCOUNTER — IMMUNIZATION (OUTPATIENT)
Dept: FAMILY MEDICINE | Facility: CLINIC | Age: 52
End: 2022-01-03
Payer: COMMERCIAL

## 2022-01-03 DIAGNOSIS — Z23 NEED FOR PROPHYLACTIC VACCINATION AND INOCULATION AGAINST INFLUENZA: Primary | ICD-10-CM

## 2022-01-03 PROCEDURE — 99207 PR NO CHARGE NURSE ONLY: CPT

## 2022-01-03 PROCEDURE — 90682 RIV4 VACC RECOMBINANT DNA IM: CPT

## 2022-01-03 PROCEDURE — 91300 PR COVID VAC PFIZER DIL RECON 30 MCG/0.3 ML IM: CPT

## 2022-01-03 PROCEDURE — 0004A PR COVID VAC PFIZER DIL RECON 30 MCG/0.3 ML IM: CPT

## 2022-01-03 PROCEDURE — 90471 IMMUNIZATION ADMIN: CPT

## 2022-01-03 NOTE — ADDENDUM NOTE
Addended by: STANLEY FOY on: 1/3/2022 10:02 AM     Modules accepted: Orders, Level of Service, SmartSet

## 2022-01-24 DIAGNOSIS — E78.5 DYSLIPIDEMIA: ICD-10-CM

## 2022-01-24 RX ORDER — ATORVASTATIN CALCIUM 20 MG/1
TABLET, FILM COATED ORAL
Qty: 90 TABLET | Refills: 3 | OUTPATIENT
Start: 2022-01-24

## 2022-02-01 RX ORDER — ATORVASTATIN CALCIUM 20 MG/1
20 TABLET, FILM COATED ORAL DAILY
Qty: 90 TABLET | Refills: 1 | Status: SHIPPED | OUTPATIENT
Start: 2022-02-01 | End: 2022-08-18

## 2022-02-01 NOTE — TELEPHONE ENCOUNTER
Jayla from [facility] is calling to follow up on a this medication.  It was sent as denial because it was sent to them on 6/1/2021.  They are stating that they never go this Prescription(s).  I will need to send it through protocol to see if it passes as it only has 2 months left on that refill and I am not able to send it as is for the 3 months that [facility] needs.

## 2022-02-01 NOTE — TELEPHONE ENCOUNTER
Return in about 27 weeks (around 6/7/2022) for Physical Exam, Lab Work.    Refilled for 6 months per protocol with a message in notes to the pharmacy that he needs to be seen in June of 6/2022  Thank you. Nancy Singleton R.N.

## 2022-04-10 ENCOUNTER — HEALTH MAINTENANCE LETTER (OUTPATIENT)
Age: 52
End: 2022-04-10

## 2022-05-03 ENCOUNTER — TELEPHONE (OUTPATIENT)
Dept: FAMILY MEDICINE | Facility: CLINIC | Age: 52
End: 2022-05-03
Payer: COMMERCIAL

## 2022-05-03 NOTE — TELEPHONE ENCOUNTER
Patient Quality Outreach    Patient is due for the following:   Diabetes -  A1C  Hypertension -  BP check  Physical  - due now  Immunizations  -  Pneumococcal and TD    NEXT STEPS:   Schedule a yearly physical    Type of outreach:    Sent apomio message.      Questions for provider review:    None     Princess Melgar MA

## 2022-05-19 DIAGNOSIS — I10 ESSENTIAL HYPERTENSION WITH GOAL BLOOD PRESSURE LESS THAN 140/90: ICD-10-CM

## 2022-05-20 NOTE — TELEPHONE ENCOUNTER
Routing refill request to provider for review/approval because:  Labs not current:    Potassium   Date Value Ref Range Status   03/01/2021 4.3 3.4 - 5.3 mmol/L Final     Creatinine   Date Value Ref Range Status   03/01/2021 1.03 0.66 - 1.25 mg/dL Final     Sodium   Date Value Ref Range Status   03/01/2021 138 133 - 144 mmol/L Final     Jo Orozco RN

## 2022-05-22 RX ORDER — AMLODIPINE BESYLATE 5 MG/1
TABLET ORAL
Qty: 90 TABLET | Refills: 0 | Status: SHIPPED | OUTPATIENT
Start: 2022-05-22 | End: 2022-08-18

## 2022-05-22 RX ORDER — HYDROCHLOROTHIAZIDE 25 MG/1
TABLET ORAL
Qty: 90 TABLET | Refills: 0 | Status: SHIPPED | OUTPATIENT
Start: 2022-05-22 | End: 2022-08-18

## 2022-05-22 RX ORDER — LOSARTAN POTASSIUM 100 MG/1
TABLET ORAL
Qty: 90 TABLET | Refills: 0 | Status: SHIPPED | OUTPATIENT
Start: 2022-05-22 | End: 2022-08-18

## 2022-05-22 RX ORDER — HYDRALAZINE HYDROCHLORIDE 25 MG/1
TABLET, FILM COATED ORAL
Qty: 180 TABLET | Refills: 0 | Status: SHIPPED | OUTPATIENT
Start: 2022-05-22 | End: 2022-09-16

## 2022-07-31 ENCOUNTER — HEALTH MAINTENANCE LETTER (OUTPATIENT)
Age: 52
End: 2022-07-31

## 2022-08-04 ENCOUNTER — HOSPITAL ENCOUNTER (EMERGENCY)
Facility: CLINIC | Age: 52
Discharge: HOME | End: 2022-08-04
Attending: EMERGENCY MEDICINE | Admitting: EMERGENCY MEDICINE
Payer: COMMERCIAL

## 2022-08-04 VITALS
SYSTOLIC BLOOD PRESSURE: 153 MMHG | OXYGEN SATURATION: 97 % | HEART RATE: 73 BPM | RESPIRATION RATE: 16 BRPM | WEIGHT: 216 LBS | HEIGHT: 76 IN | TEMPERATURE: 97.9 F | DIASTOLIC BLOOD PRESSURE: 93 MMHG | BODY MASS INDEX: 26.3 KG/M2

## 2022-08-04 DIAGNOSIS — G24.3 SPASMODIC TORTICOLLIS: ICD-10-CM

## 2022-08-04 PROCEDURE — 250N000013 HC RX MED GY IP 250 OP 250 PS 637: Performed by: EMERGENCY MEDICINE

## 2022-08-04 PROCEDURE — 20552 NJX 1/MLT TRIGGER POINT 1/2: CPT | Performed by: EMERGENCY MEDICINE

## 2022-08-04 PROCEDURE — 99283 EMERGENCY DEPT VISIT LOW MDM: CPT | Mod: 25

## 2022-08-04 PROCEDURE — 20552 NJX 1/MLT TRIGGER POINT 1/2: CPT

## 2022-08-04 RX ORDER — OXYCODONE HYDROCHLORIDE 5 MG/1
10 TABLET ORAL ONCE
Status: COMPLETED | OUTPATIENT
Start: 2022-08-04 | End: 2022-08-04

## 2022-08-04 RX ORDER — OXYCODONE HYDROCHLORIDE 5 MG/1
5 TABLET ORAL EVERY 6 HOURS PRN
Qty: 10 TABLET | Refills: 0 | Status: SHIPPED | OUTPATIENT
Start: 2022-08-04 | End: 2024-04-19

## 2022-08-04 RX ADMIN — OXYCODONE HYDROCHLORIDE 10 MG: 5 TABLET ORAL at 03:25

## 2022-08-04 NOTE — ED TRIAGE NOTES
Pt with severe  R sided shoulder, arm and neck pain that started to night at hockey around 9:30.  He can not lift his head up.  He has been having issues for over a month, with numbness and tingling in his R arm and fingers.  He has not seen anyone for this, he has tried to 'work it out' with messaging it with ball.   Pt has taken ibuprofen and tylenol tonight without relief.

## 2022-08-04 NOTE — ED PROVIDER NOTES
History     Chief Complaint   Patient presents with     Neck Pain     Shoulder Pain     HPI  Teddy Shah is a 52 year old male who presents for right-sided posterior neck pain and right shoulder pain.  The patient says that he has been having some occasional paresthesias of the right upper extremity and spasms in the back.  He has been trying to use a rollerball to help with symptoms.  Tonight he went to hockey and played hockey and then afterwards he felt very tight and had severe pain and spasms in the back of his neck.  He is used acetaminophen and ibuprofen with minimal improvement.  Denies any fever, chills, shortness of breath, abdominal pain, nausea, vomiting.  No weakness in the arm.  No numbness currently.  No known injury.    Allergies:  No Known Allergies    Problem List:    Patient Active Problem List    Diagnosis Date Noted     Diabetes mellitus, type 2 (H) 05/02/2021     Priority: Medium     Seasonal allergic rhinitis, unspecified chronicity, unspecified trigger 04/24/2018     Priority: Medium     Essential hypertension with goal blood pressure less than 140/90 07/13/2016     Priority: Medium     Hx of Epi-LASIK 2008 10/22/2013     Priority: Medium     Hyperlipidemia LDL goal <130 10/31/2010     Priority: Medium        Past Medical History:    Past Medical History:   Diagnosis Date     Adjustment disorder with anxiety 04/12/2013     Arthritis      Closed fracture of humerus 07/08/2010     Delayed union of fracture 07/08/2010     Diabetes mellitus, type 2 (H) 5/2/2021     HTN (hypertension)      Hypercholesteremia        Past Surgical History:    Past Surgical History:   Procedure Laterality Date     COLONOSCOPY WITH CO2 INSUFFLATION N/A 05/26/2016    Procedure: COLONOSCOPY WITH CO2 INSUFFLATION;  Surgeon: Gerber Fitzpatrick MD;  Location:  OR     ESOPHAGOSCOPY, GASTROSCOPY, DUODENOSCOPY (EGD), COMBINED  07/15/2014    Procedure: COMBINED ESOPHAGOSCOPY, GASTROSCOPY, DUODENOSCOPY (EGD), BIOPSY  "SINGLE OR MULTIPLE;  Surgeon: Teddy Grimes MD;  Location: [facility] OR     LASIK  2008    Epi-LASIK due to thin cornea     ORTHOPEDIC SURGERY  01/04/2010    ORIF humerus       Family History:    Family History   Problem Relation Age of Onset     Heart Disease Father         mi at 41yo.     Hypertension Father      Coronary Artery Disease Father      Emphysema Mother      Hypertension Sister      Cancer Other         colon cancer - paternal side 3 cousins and uncle     Diabetes No family hx of      Cerebrovascular Disease No family hx of      Thyroid Disease No family hx of      Glaucoma No family hx of      Macular Degeneration No family hx of        Social History:  Marital Status:   [2]  Social History     Tobacco Use     Smoking status: Never Smoker     Smokeless tobacco: Never Used     Tobacco comment: Lives in smoke free household   Substance Use Topics     Alcohol use: Yes     Comment: occasional     Drug use: No        Medications:    oxyCODONE (ROXICODONE) 5 MG tablet  ALLEGRA ALLERGY 180 MG tablet  amLODIPine (NORVASC) 5 MG tablet  atorvastatin (LIPITOR) 20 MG tablet  hydrALAZINE (APRESOLINE) 25 MG tablet  hydrochlorothiazide (HYDRODIURIL) 25 MG tablet  losartan (COZAAR) 100 MG tablet  sildenafil (REVATIO) 20 MG tablet          Review of Systems  A 2 point review of systems was performed. All pertinent positives and negatives were listed in the HPI and rest of ROS were otherwise negative.    Physical Exam   BP: (!) 153/93  Pulse: 73  Temp: 97.9  F (36.6  C)  Resp: 16  Height: 193 cm (6' 4\")  Weight: 98 kg (216 lb)  SpO2: 97 %      Physical Exam  Constitutional:       General: He is not in acute distress.     Appearance: He is well-developed. He is not diaphoretic.   HENT:      Head: Normocephalic and atraumatic.   Eyes:      General: No scleral icterus.  Musculoskeletal:      Cervical back: Normal range of motion and neck supple. Torticollis present. No erythema or crepitus. No spinous process " tenderness.   Skin:     General: Skin is warm and dry.      Findings: No rash.   Neurological:      Mental Status: He is alert and oriented to person, place, and time.         ED Course                 Procedures  Trigger point injection  Location: upper back  Indication: pain  Date: 8/4/2022   The patient gave verbal consent for the procedure. Risks and benefits were discussed.  Pre-procedure exam: Motor and sensory exam normal prior to injection. Normal perfusion.  Procedure: A 30 gauge needle was inserted under normal sterile procedure. Approximately 2 mL of 0.5% bupivacaine was injected. The patient tolerated the procedure well without immediate complication. Pain was improved.             Critical Care time:  none               No results found for this or any previous visit (from the past 24 hour(s)).    Medications   oxyCODONE (ROXICODONE) tablet 10 mg (10 mg Oral Given 8/4/22 0325)       Assessments & Plan (with Medical Decision Making)   52-year-old male presents for posterior neck and right shoulder pain.  Vital signs are reassuring as above.  No signs of fracture or dislocation on exam.  He has a normal neurologic exam of the bilateral extremities and is ambulating without difficulty, no signs of impingement of the spinal cord.  He is given oxycodone and a trigger point injection is performed as above for symptoms.  At this time he is safe to discharge with a short course of oxycodone and instructions to use massage and walking and return if worse, otherwise follow-up in clinic.  The patient is in agreement with this plan.    I have reviewed the nursing notes.    I have reviewed the findings, diagnosis, plan and need for follow up with the patient.       New Prescriptions    OXYCODONE (ROXICODONE) 5 MG TABLET    Take 1 tablet (5 mg) by mouth every 6 hours as needed for severe pain       Final diagnoses:   Spasmodic torticollis       8/4/2022   [facility] EMERGENCY DEPT     Francisco Montgomery  MD Javier  08/04/22 0617

## 2022-08-04 NOTE — DISCHARGE INSTRUCTIONS
Return to the emergency department for worsening symptoms, fevers, rash, difficulty breathing, or other concerns.  Follow-up in clinic if not starting to feel better over the next 3 to 4 days.    Use ibuprofen and acetaminophen for your symptoms.  Use oxycodone as needed for pain that is not controlled by the prior two medications.    Oxycodone is an addictive opioid medication, please only take it when the pain is more than can be controlled by acetaminophen or ibuprofen alone. It will also make you lightheaded, nauseated, and constipated.  Do not drive, operate heavy machinery, or take care of young children while taking this medication. Do not mix it with other medications or drugs that will make you sleepy, such as alcohol.     Repeated studies have shown that the longer you use opioid pain medications, the longer it is until you return to normal function. It is our recommendation that you taper off opioids as quickly as possible with the goal of returning to normal function or near normal function. Long term use of opioids quickly results in growing tolerance to the medication (less of the benefits) and increased dependence (more of the bad side effects).     Pain is very difficult to treat and we can very rarely take away pain completely. If you are having difficulty with pain over several weeks after an injury, you may need to start different medications and therapies (such as physical therapy, graded exercise, massage, and acupuncture). Please talk about this with your regular doctor.     If you are interested in seeking free, confidential treatment referral and information service for individuals and families facing mental health and/or substance use disorders please call [phone]-"[employer] ([phone])   
18-Apr-2021

## 2022-08-05 ENCOUNTER — PATIENT OUTREACH (OUTPATIENT)
Dept: CARE COORDINATION | Facility: CLINIC | Age: 52
End: 2022-08-05

## 2022-08-05 DIAGNOSIS — Z71.89 OTHER SPECIFIED COUNSELING: ICD-10-CM

## 2022-08-05 NOTE — PROGRESS NOTES
Clinic Care Coordination Contact  [facility]/Voicemail      Clinical Data: Care Coordinator Outreach  Reason for referral: TCM outreach  Outreach attempted x 1.  Left message on patient's voicemail with main [facility] phone number to reach nurse advisors or scheduling department.  Plan:  Care Coordinator will try to reach patient again in 1-2 business days.       Kait Trejo  Community Health Worker  Connected Care Resource [place], [facility]  Ph: [phone]

## 2022-08-06 ENCOUNTER — HOSPITAL ENCOUNTER (EMERGENCY)
Facility: CLINIC | Age: 52
Discharge: HOME | End: 2022-08-06
Attending: PHYSICIAN ASSISTANT | Admitting: PHYSICIAN ASSISTANT
Payer: COMMERCIAL

## 2022-08-06 VITALS
OXYGEN SATURATION: 98 % | RESPIRATION RATE: 20 BRPM | SYSTOLIC BLOOD PRESSURE: 165 MMHG | HEART RATE: 74 BPM | TEMPERATURE: 96.3 F | DIASTOLIC BLOOD PRESSURE: 70 MMHG

## 2022-08-06 DIAGNOSIS — G24.3 SPASMODIC TORTICOLLIS: ICD-10-CM

## 2022-08-06 PROCEDURE — 99214 OFFICE O/P EST MOD 30 MIN: CPT | Performed by: PHYSICIAN ASSISTANT

## 2022-08-06 PROCEDURE — G0463 HOSPITAL OUTPT CLINIC VISIT: HCPCS | Performed by: PHYSICIAN ASSISTANT

## 2022-08-06 RX ORDER — OXYCODONE HYDROCHLORIDE 5 MG/1
5 TABLET ORAL EVERY 6 HOURS PRN
Qty: 6 TABLET | Refills: 0 | Status: SHIPPED | OUTPATIENT
Start: 2022-08-06 | End: 2022-08-09

## 2022-08-06 RX ORDER — LIDOCAINE 50 MG/G
1 PATCH TOPICAL EVERY 24 HOURS
Qty: 10 PATCH | Refills: 0 | Status: SHIPPED | OUTPATIENT
Start: 2022-08-06 | End: 2022-08-16

## 2022-08-06 RX ORDER — METHOCARBAMOL 500 MG/1
1000 TABLET, FILM COATED ORAL 4 TIMES DAILY
Qty: 32 TABLET | Refills: 0 | Status: SHIPPED | OUTPATIENT
Start: 2022-08-06 | End: 2022-08-08

## 2022-08-06 ASSESSMENT — ENCOUNTER SYMPTOMS
NUMBNESS: 1
CONSTITUTIONAL NEGATIVE: 1
BACK PAIN: 1
NECK PAIN: 1
CARDIOVASCULAR NEGATIVE: 1
WEAKNESS: 0
RESPIRATORY NEGATIVE: 1

## 2022-08-06 NOTE — ED PROVIDER NOTES
History     Chief Complaint   Patient presents with     Torticollis     Seen here 2 days ago. No improvement. States the oxycodone are almost gone. Requesting a muscle relaxer     HPI  Teddy Shah is a 52 year old male with a past medical history of hyperlipidemia, essential hypertension, allergic rhinitis, diabetes mellitus type 2 who presents for evaluation of right-sided neck and upper back pain which began 2 days ago.  He noticed the neck pain and muscle spasms after playing hockey 2 nights ago.  He was seen and evaluated at [facility] emergency department 2 days ago, underwent a trigger point injection with limited relief of symptoms.  He has been taking prescribed oxycodone which has helped relieve his pain.  He describes having some intermittent paresthesias in the right upper extremity as well.  He has been trying to use a roller ball to help loosen up the trapezius and upper back muscles with limited relief.  Heat makes him tighten up more, ice seems to make him stiffen up more as well.  He has been applying topical muscle rubs with limited relief.  He would like to try muscle relaxer today.  Denies any fever or chills, chest discomfort or shortness of breath, no abdominal pain, nausea/vomiting.  No throat pain.  No recent medication changes.  He has no weakness or numbness in the arm currently.  Of note, he did have a right humerus fracture several years ago, had a rojas placed.  Has some spasms in his right hand which have been ongoing for the past year, no acute changes.    Allergies:  No Known Allergies    Problem List:    Patient Active Problem List    Diagnosis Date Noted     Diabetes mellitus, type 2 (H) 05/02/2021     Priority: Medium     Seasonal allergic rhinitis, unspecified chronicity, unspecified trigger 04/24/2018     Priority: Medium     Essential hypertension with goal blood pressure less than 140/90 07/13/2016     Priority: Medium     Hx of Epi-LASIK 2008 10/22/2013     Priority:  Medium     Hyperlipidemia LDL goal <130 10/31/2010     Priority: Medium        Past Medical History:    Past Medical History:   Diagnosis Date     Adjustment disorder with anxiety 04/12/2013     Arthritis      Closed fracture of humerus 07/08/2010     Delayed union of fracture 07/08/2010     Diabetes mellitus, type 2 (H) 5/2/2021     HTN (hypertension)      Hypercholesteremia        Past Surgical History:    Past Surgical History:   Procedure Laterality Date     COLONOSCOPY WITH CO2 INSUFFLATION N/A 05/26/2016    Procedure: COLONOSCOPY WITH CO2 INSUFFLATION;  Surgeon: Gerber Fitzpatrick MD;  Location: [facility]     ESOPHAGOSCOPY, GASTROSCOPY, DUODENOSCOPY (EGD), COMBINED  07/15/2014    Procedure: COMBINED ESOPHAGOSCOPY, GASTROSCOPY, DUODENOSCOPY (EGD), BIOPSY SINGLE OR MULTIPLE;  Surgeon: Teddy Grimes MD;  Location: [facility] OR     LASIK  2008    Epi-LASIK due to thin cornea     ORTHOPEDIC SURGERY  01/04/2010    ORIF humerus       Family History:    Family History   Problem Relation Age of Onset     Heart Disease Father         mi at 39yo.     Hypertension Father      Coronary Artery Disease Father      Emphysema Mother      Hypertension Sister      Cancer Other         colon cancer - paternal side 3 cousins and uncle     Diabetes No family hx of      Cerebrovascular Disease No family hx of      Thyroid Disease No family hx of      Glaucoma No family hx of      Macular Degeneration No family hx of        Social History:  Marital Status:   [2]  Social History     Tobacco Use     Smoking status: Never Smoker     Smokeless tobacco: Never Used     Tobacco comment: Lives in smoke free household   Substance Use Topics     Alcohol use: Yes     Comment: occasional     Drug use: No        Medications:    lidocaine (LIDODERM) 5 % patch  methocarbamol (ROBAXIN) 500 MG tablet  oxyCODONE (ROXICODONE) 5 MG tablet  ALLEGRA ALLERGY 180 MG tablet  amLODIPine (NORVASC) 5 MG tablet  atorvastatin (LIPITOR) 20 MG  tablet  hydrALAZINE (APRESOLINE) 25 MG tablet  hydrochlorothiazide (HYDRODIURIL) 25 MG tablet  losartan (COZAAR) 100 MG tablet  oxyCODONE (ROXICODONE) 5 MG tablet  sildenafil (REVATIO) 20 MG tablet          Review of Systems   Constitutional: Negative.    Respiratory: Negative.    Cardiovascular: Negative.    Musculoskeletal: Positive for back pain and neck pain.   Neurological: Positive for numbness. Negative for weakness.       Physical Exam   BP: (!) 165/70  Pulse: 74  Temp: (!) 96.3  F (35.7  C)  Resp: 20  SpO2: 98 %      Physical Exam  Constitutional:       General: He is not in acute distress.     Appearance: Normal appearance. He is not ill-appearing, toxic-appearing or diaphoretic.   Neck:      Comments: Torticollis noted on the right side of the neck.  Unable to fully extend the neck, has about 10 degrees flexion at rest.  Unable to turn his neck to the left side due to pain.   Cardiovascular:      Pulses: Normal pulses.           Radial pulses are 2+ on the right side and 2+ on the left side.   Musculoskeletal:         General: No swelling or tenderness.      Right shoulder: Normal.      Cervical back: Neck supple. Spasms and torticollis present. No swelling, edema, deformity, erythema, signs of trauma, rigidity, tenderness or crepitus. Pain with movement present. No spinous process tenderness or muscular tenderness. Decreased range of motion.      Thoracic back: Normal.        Back:       Comments: Spasms noted over the right trapezius and rhomboid muscles.  No midline tenderness.  No swelling or deformities, no erythema.   Skin:     General: Skin is warm and dry.      Findings: No laceration, lesion or rash.   Neurological:      Mental Status: He is alert and oriented to person, place, and time.      GCS: GCS eye subscore is 4. GCS verbal subscore is 5. GCS motor subscore is 6.      Cranial Nerves: Cranial nerves 2-12 are intact. No cranial nerve deficit, dysarthria or facial asymmetry.      Sensory:  Sensation is intact. No sensory deficit.      Deep Tendon Reflexes:      Reflex Scores:       Bicep reflexes are 2+ on the right side and 2+ on the left side.       Brachioradialis reflexes are 2+ on the right side and 2+ on the left side.     Comments: Strength is 5/5 in the right upper extremity.  Normal sensation to light touch bilaterally in the upper extremities.         ED Course                 Procedures              No results found for this or any previous visit (from the past 24 hour(s)).    Medications - No data to display    Assessments & Plan (with Medical Decision Making)     The patient is a 52-year-old male who presents for evaluation of right-sided neck and upper back pain which began 2 days ago after playing in a hockey game.  He was diagnosed with spasmodic torticollis when evaluated 2 days ago, underwent a trigger point injection in the upper back which he states did little to alleviate his pain.  He has been taking prescribed oxycodone which has helped him sleep.  No meningeal signs today.  No fevers.  No signs of infection on physical exam.  The patient primarily has muscle spasms of the right trapezius and right rhomboid muscles.  I feel his muscle spasms are causing intermittent paresthesias in his right upper extremity.  No numbness or weakness in the right upper extremity on exam.  Normal circulatory function in the right upper extremity on exam.  No throat pain or cervical lymphadenopathy to suggest a peritonsillar abscess or retropharyngeal abscess.  No fevers.  No recent medication changes which may have contributed to torticollis.    Provided a small quantity of additional oxycodone for breakthrough pain. Recommend not driving or operating equipment while taking oxycodone. Recommend lots of fluids. May take Miralax while using oxycodone to prevent constipation, discussed this with the patient..    Provided a referral to orthopedic spine for further evaluation and management considering  the finding of torticollis.    Prescribed robaxin, which is a muscle relaxant.  Muscle relaxants will make you feel drowsy.  No working, driving, or operating equipment for 8 hours from your last dose of muscle relaxants.    Recommend urgent medical evaluation if you develop worsening pain, worsening numbness or tingling or loss of feeling in the back, neck, or upper extremities.  May use Voltaren gel 4 times per day or a lidocaine patches (apply to the low back for 12 hours per day) for additional pain control. Recommend not using heat at the same time as lidocaine patches or Voltaren gel to avoid excessive irritation.    Addendum: I called the patient to discuss ongoing care.  I feel that muscle relaxants will not mitigate his recent development of spasmodic torticollis.  Recommended follow-up with his primary care provider for further evaluation and management.  Discussed that there may be some additional medications such as benzodiazepines or Botox to try to help release the muscles causing the torticollis.  I feel follow-up with orthopedic spine is also appropriate at this time.       I have reviewed the nursing notes.    I have reviewed the findings, diagnosis, plan and need for follow up with the patient.    New Prescriptions    LIDOCAINE (LIDODERM) 5 % PATCH    Place 1 patch onto the skin every 24 hours for 10 days To prevent lidocaine toxicity, patient should be patch free for 12 hrs daily.    METHOCARBAMOL (ROBAXIN) 500 MG TABLET    Take 2 tablets (1,000 mg) by mouth 4 times daily for 4 days    OXYCODONE (ROXICODONE) 5 MG TABLET    Take 1 tablet (5 mg) by mouth every 6 hours as needed for severe pain       Final diagnoses:   Spasmodic torticollis       8/6/2022   [facility] EMERGENCY DEPT     Ahsan Melgar PA-C  08/06/22 1725       Ahsan Melgar PA-C  08/06/22 1907       Ahsan Melgar PA-C  08/06/22 1918       Ahsan Melgar PA-C  08/06/22 1918

## 2022-08-06 NOTE — PROGRESS NOTES
Clinic Care Coordination Contact  [facility]/Voicemail       Clinical Data: Care Coordinator Outreach  Outreach attempted x 2.  Left message on patient's voicemail with call back information and requested return call.  Plan:Care Coordinator will do no further outreaches at this time.    Jaida Goodman  Community Health Worker  [facility]  [phone]

## 2022-08-06 NOTE — DISCHARGE INSTRUCTIONS
Prescribed robaxin, which is a muscle relaxant.  Muscle relaxants will make you feel drowsy.  No working, driving, or operating equipment for 8 hours from your last dose of muscle relaxants.    Recommend urgent medical evaluation if you develop worsening pain, worsening numbness or tingling or loss of feeling in the back, neck, or upper extremities.  May use Voltaren gel 4 times per day or a lidocaine patches (apply to the low back for 12 hours per day) for additional pain control. Recommend not using heat at the same time as lidocaine patches or Voltaren gel to avoid excessive irritation.

## 2022-08-08 ENCOUNTER — OFFICE VISIT (OUTPATIENT)
Dept: PALLIATIVE MEDICINE | Facility: CLINIC | Age: 52
End: 2022-08-08
Attending: PHYSICIAN ASSISTANT
Payer: COMMERCIAL

## 2022-08-08 ENCOUNTER — TELEPHONE (OUTPATIENT)
Dept: PALLIATIVE MEDICINE | Facility: CLINIC | Age: 52
End: 2022-08-08

## 2022-08-08 VITALS — HEART RATE: 76 BPM | SYSTOLIC BLOOD PRESSURE: 176 MMHG | DIASTOLIC BLOOD PRESSURE: 64 MMHG

## 2022-08-08 DIAGNOSIS — M79.18 MYOFASCIAL MUSCLE PAIN: ICD-10-CM

## 2022-08-08 DIAGNOSIS — Z86.59 HISTORY OF CLAUSTROPHOBIA: ICD-10-CM

## 2022-08-08 DIAGNOSIS — G24.3 SPASMODIC TORTICOLLIS: ICD-10-CM

## 2022-08-08 DIAGNOSIS — F41.9 ANXIETY: ICD-10-CM

## 2022-08-08 DIAGNOSIS — M54.12 CERVICAL RADICULOPATHY: Primary | ICD-10-CM

## 2022-08-08 PROCEDURE — 99204 OFFICE O/P NEW MOD 45 MIN: CPT | Performed by: PAIN MEDICINE

## 2022-08-08 RX ORDER — GABAPENTIN 100 MG/1
100 CAPSULE ORAL 3 TIMES DAILY
Qty: 90 CAPSULE | Refills: 1 | Status: SHIPPED | OUTPATIENT
Start: 2022-08-08 | End: 2024-04-19

## 2022-08-08 RX ORDER — METHOCARBAMOL 500 MG/1
1000 TABLET, FILM COATED ORAL 4 TIMES DAILY
Qty: 240 TABLET | Refills: 1 | Status: SHIPPED | OUTPATIENT
Start: 2022-08-08 | End: 2022-08-09

## 2022-08-08 RX ORDER — GABAPENTIN 100 MG/1
100 CAPSULE ORAL 3 TIMES DAILY
Qty: 90 CAPSULE | Refills: 1 | Status: SHIPPED | OUTPATIENT
Start: 2022-08-08 | End: 2022-08-08

## 2022-08-08 RX ORDER — DIAZEPAM 5 MG
5 TABLET ORAL ONCE
Qty: 1 TABLET | Refills: 0 | Status: SHIPPED | OUTPATIENT
Start: 2022-08-08 | End: 2022-08-09

## 2022-08-08 ASSESSMENT — PAIN SCALES - GENERAL: PAINLEVEL: EXTREME PAIN (8)

## 2022-08-08 NOTE — PATIENT INSTRUCTIONS
- Further procedures recommended:    - will first get imaging   - Medication Management:    -  continue robaxin    - -start gabapentin low dose 100mg three times a day. 100mg in the pm for 3 days--> add 100mg in the am for 3 days--> 100mg in the afternoon as tolerated. Will continue to titrate as tolerated.    - Physical Therapy: ordered [facility]    - Clinical Health Psychologist to address issues of relaxation, behavioral change, coping style, and other factors important to improvement: no  - Diagnostic Studies:cervical MRI   - Urine toxicology screen today: no  - Follow up:    - will call with the results to discuss plan           ----------------------------------------------------------------  Clinic Number:  [phone]   Call with any questions about your care and for scheduling assistance.   Calls are returned Monday through Friday between 8 AM and 4:30 PM. We usually get back to you within 2 business days depending on the issue/request.    If we are prescribing your medications:  For opioid medication refills, call the clinic or send a Jugo message 7 days in advance.  Please include:  Name of requested medication  Name of the pharmacy.  For non-opioid medications, call your pharmacy directly to request a refill. Please allow 3-4 days to be processed.   Per [place] State Law:  All controlled substance prescriptions must be filled within 30 days of being written.    For those controlled substances allowing refills, pickup must occur within 30 days of last fill.      We believe regular attendance is key to your success in our program!    Any time you are unable to keep your appointment we ask that you call us at least 24 hours in advance to cancel.This will allow us to offer the appointment time to another patient.   Multiple missed appointments may lead to dismissal from the clinic.

## 2022-08-08 NOTE — PROGRESS NOTES
[facility]    Date of visit: 8/8/2022    Primary Care Provider: Dr. Hasmukh Vick    Reason for consultation:    Teddy Shah is a 52 year old male who is seen in consultation today at the request of his primary care physician, Hasmukh Vick .       Chief Complaint:    rneck pain radiating to his UE    Pain history:  Teddy Shah is a 52 year old male with progressive right sided neck and shoulder pain  The pain started 2 months ago  The pt said working at his desk also playing hockey, but does note as specific accident  The pt went to ed got a tpi with no benefit  Went to chiro without improvement   Went to urgent care started him on robaxin/vicodin  Currently neck is extremely painful   The pain is right sided  The pain radiates to his right thumb  The pain varies in nature and severity  The pain can be sharp   The pain can be spasms  The pain is deep aching  Pain is throbbing  The pt notes numbness ting burning  THe pain is worse with heat  Worse with ext and rotatin  Worse with ice  Some benefit with thee  Some benefit with robaxin  Some benefit with lifting arm above his head  The pt feels like his biceps is weaker   Denies any changes in his hand    The pt reports pain is affecting his sleep  Pain is sig affecting his quality of life    Denies red flags including: bowel or bladder symptoms, fever, chills, saddle anesthesia, profound motor loss, history of cancer, history of immune compromise, weight loss.     Impact of Pain: There is significant limitation     Current medication treatments include:  Oxy  Robaxin 1000mg bid    Previous medication treatments included:      Other treatments have included:  Teddy Shah has  notbeen seen at a pain clinic in the past.      PT: not yet      Interventional:  Acupuncture/chiro yes no help  TENs Unit:   Injections: n      Past Medical History:  Past Medical History:   Diagnosis Date     Adjustment disorder with anxiety  04/12/2013     Arthritis      Closed fracture of humerus 07/08/2010     Delayed union of fracture 07/08/2010     Diabetes mellitus, type 2 (H) 5/2/2021     HTN (hypertension)      Hypercholesteremia      Past Surgical History:  Past Surgical History:   Procedure Laterality Date     COLONOSCOPY WITH CO2 INSUFFLATION N/A 05/26/2016    Procedure: COLONOSCOPY WITH CO2 INSUFFLATION;  Surgeon: Gerber Fitzpatrick MD;  Location: [facility] OR     ESOPHAGOSCOPY, GASTROSCOPY, DUODENOSCOPY (EGD), COMBINED  07/15/2014    Procedure: COMBINED ESOPHAGOSCOPY, GASTROSCOPY, DUODENOSCOPY (EGD), BIOPSY SINGLE OR MULTIPLE;  Surgeon: Teddy Grimes MD;  Location: [facility] OR     LASIK  2008    Epi-LASIK due to thin cornea     ORTHOPEDIC SURGERY  01/04/2010    ORIF humerus     Medications:  Current Outpatient Medications   Medication Sig Dispense Refill     diazepam (VALIUM) 5 MG tablet Take 1 tablet (5 mg) by mouth once for 1 dose 30-45 min prior to MRI will need  1 tablet 0     gabapentin (NEURONTIN) 100 MG capsule Take 1 capsule (100 mg) by mouth 3 times daily 90 capsule 1     methocarbamol (ROBAXIN) 500 MG tablet Take 2 tablets (1,000 mg) by mouth 4 times daily 240 tablet 1     ALLEGRA ALLERGY 180 MG tablet TAKE 1 TABLET DAILY AS NEEDED FOR ALLERGIES 90 tablet 1     amLODIPine (NORVASC) 5 MG tablet TAKE 1 TABLET DAILY 90 tablet 0     atorvastatin (LIPITOR) 20 MG tablet Take 1 tablet (20 mg) by mouth daily In 90 days he will switch from Simvastatin to Atorvastatin. 90 tablet 1     hydrALAZINE (APRESOLINE) 25 MG tablet TAKE 1 TABLET TWICE A  tablet 0     hydrochlorothiazide (HYDRODIURIL) 25 MG tablet TAKE 1 TABLET DAILY 90 tablet 0     lidocaine (LIDODERM) 5 % patch Place 1 patch onto the skin every 24 hours for 10 days To prevent lidocaine toxicity, patient should be patch free for 12 hrs daily. 10 patch 0     losartan (COZAAR) 100 MG tablet TAKE 1 TABLET DAILY 90 tablet 0     oxyCODONE (ROXICODONE) 5 MG tablet Take 1  tablet (5 mg) by mouth every 6 hours as needed for severe pain 6 tablet 0     oxyCODONE (ROXICODONE) 5 MG tablet Take 1 tablet (5 mg) by mouth every 6 hours as needed for severe pain 10 tablet 0     sildenafil (REVATIO) 20 MG tablet Take 2-5 tablets at once as needed, max once per day. 30 tablet 3     Allergies:   No Known Allergies    Social History:    Chemical dependency: The patient denies any history of treatment for alcohol or drug abuse.    Family history:  Family History   Problem Relation Age of Onset     Heart Disease Father         mi at 41yo.     Hypertension Father      Coronary Artery Disease Father      Emphysema Mother      Hypertension Sister      Cancer Other         colon cancer - paternal side 3 cousins and uncle     Diabetes No family hx of      Cerebrovascular Disease No family hx of      Thyroid Disease No family hx of      Glaucoma No family hx of      Macular Degeneration No family hx of              Diagnostic Tests:  MRI not yet        Review of Systems:    POSTIVE IN BOLD  GENERAL: fever/chills, fatigue, general unwell feeling, weight gain/loss.  HEAD/EYES:  headache, dizziness, or vision changes.    EARS/NOSE/THROAT:  Nosebleeds, hearing loss, sinus infection, earache, tinnitus.  IMMUNE:  Allergies, cancer, immune deficiency, or infections.  SKIN:  Urticaria, rash, hives  HEME/Lymphatic:   anemia, easy bruising, easy bleeding.  RESPIRATORY:  cough, wheezing, or shortness of breath  CARDIOVASCULAR/Circulation:  Extremity edema, syncope, hypertension, tachycardia, or angina.  GASTROINTESTINAL:  abdominal pain, nausea/emesis, diarrhea, constipation,  hematochezia, or melena.  ENDOCRINE:  Diabetes, steroid use,  thyroid disease or osteoporosis.  MUSCULOSKELETAL: neck pain, back pain, arthralgia, arthritis, or gout.  GENITOURINARY:  frequency, urgency, dysuria, difficulty voiding, hematuria or incontinence.  NEUROLOGIC:  weakness, numbness, paresthesias, seizure, tremor, stroke or memory  loss.  PSYCHIATRIC:  depression, anxiety, stress, suicidal thoughts or mood swings.     Physical Exam:  Vitals:    08/08/22 1512   BP: (!) 176/64   Pulse: 76     Exam:  Constitutional: healthy, alert and no distress  Head: normocephalic. Atraumatic.    Cardiovascular: neg jvd  Respiratory: speaking in full sentences. No accessory muscles     Psychiatric: mentation appears normal and affect normal/bright    Musculoskeletal exam:  Gait/Station/Posture: right arm above his head throughout apt  Cervical spine: sig dec  + spurling on the right        Neurologic exam:  Motor:  5/5 UE and LE strength, except for slightly weaker 4/5 elbow flexion and ext pt reports chronic neuropathy elbow injury    Reflexes:     Biceps:     R:  2/4 L: 2/4   Brachioradialis   R:  2/4 L: 2/4   Triceps:  R:  2/4 L: 2/4     Sensory:  (upper and lower extremities):   Light touch: decreased over right thumb   Allodynia: absent    Hyperalgesia: absent         Assessment:  Teddy Shah is a 52 year old male with acute progressive r neck pain radiating to rue         Plan:  Diagnosis reviewed, treatment options addressed, and risks/benefits discussed. The patient was involved in shared decision making regarding the plan as laid out below.    - Further procedures recommended:    - will first get imaging   - Medication Management:    -  continue robaxin    - -start gabapentin low dose 100mg three times a day. 100mg in the pm for 3 days--> add 100mg in the am for 3 days--> 100mg in the afternoon as tolerated. Will continue to titrate as tolerated.    - Physical Therapy: ordered [facility]    - Clinical Health Psychologist to address issues of relaxation, behavioral change, coping style, and other factors important to improvement: no  - Diagnostic Studies:cervical MRI   - Urine toxicology screen today: no  - Follow up:    - will call with the results to discuss plan             The total TIME spent on this patient on the day of the  appointment was 30 minutes.   Time spent preparing to see the patient (reviewing records and tests)  Time spend face to face with the patient  Time spent ordering tests, medications, procedures and referrals  Time spent Referring and communicating with other healthcare professionals  Documenting clinical information in Epic  Jeffy Cabral MD.  Medical Spine Specialist  [facility]

## 2022-08-08 NOTE — TELEPHONE ENCOUNTER
2 of 3 [facility], [facility] send all to [facility]   -methocarbamol (ROBAXIN) 500 MG tablet  -diazepam (VALIUM) 5 MG tablet    Prepping for provider. Patient will be back to pharmacy in morning. Please route back to nursing to cancel scripts at [facility]     Lillie Pena RN, BSN, CMSRN  RN Care Coordinator  [facility]

## 2022-08-09 ENCOUNTER — DOCUMENTATION ONLY (OUTPATIENT)
Dept: LAB | Facility: CLINIC | Age: 52
End: 2022-08-09

## 2022-08-09 DIAGNOSIS — I10 ESSENTIAL HYPERTENSION WITH GOAL BLOOD PRESSURE LESS THAN 140/90: ICD-10-CM

## 2022-08-09 DIAGNOSIS — E78.5 HYPERLIPIDEMIA LDL GOAL <130: ICD-10-CM

## 2022-08-09 DIAGNOSIS — E11.9 TYPE 2 DIABETES MELLITUS WITHOUT COMPLICATION, WITHOUT LONG-TERM CURRENT USE OF INSULIN (H): Primary | ICD-10-CM

## 2022-08-09 DIAGNOSIS — Z12.5 SCREENING PSA (PROSTATE SPECIFIC ANTIGEN): ICD-10-CM

## 2022-08-09 RX ORDER — DIAZEPAM 5 MG
5 TABLET ORAL ONCE
Qty: 1 TABLET | Refills: 0 | Status: SHIPPED | OUTPATIENT
Start: 2022-08-09 | End: 2022-08-09

## 2022-08-09 RX ORDER — METHOCARBAMOL 500 MG/1
1000 TABLET, FILM COATED ORAL 4 TIMES DAILY
Qty: 240 TABLET | Refills: 1 | Status: SHIPPED | OUTPATIENT
Start: 2022-08-09 | End: 2024-04-19

## 2022-08-09 NOTE — PROGRESS NOTES
Teddy Shah has an upcoming lab appointment:    Future Appointments   Date Time Provider Department Center   8/11/2022  2:30 PM Suzi Hebert, PT [facility]   8/14/2022 10:00 AM AN [facility]   8/18/2022 10:00 AM Hasmukh Vick MD [facility]   8/19/2022  3:10 PM Suzi Hebert, PT [facility]   8/30/2022  2:30 PM Bryan Awan PT [facility]     Patient is scheduled for the following lab(s):     There is no order available. Please review and place either future orders or HMPO (Review of Health Maintenance Protocol Orders), as appropriate.    Health Maintenance Due   Topic     ANNUAL REVIEW OF HM ORDERS      HIV SCREENING      HEPATITIS C SCREENING      A1C      BMP      MICROALBUMIN      Jodie Emanuel

## 2022-08-11 ENCOUNTER — THERAPY VISIT (OUTPATIENT)
Dept: PHYSICAL THERAPY | Facility: CLINIC | Age: 52
End: 2022-08-11
Attending: PAIN MEDICINE
Payer: COMMERCIAL

## 2022-08-11 DIAGNOSIS — M54.12 CERVICAL RADICULOPATHY: ICD-10-CM

## 2022-08-11 DIAGNOSIS — M25.519 SHOULDER PAIN: ICD-10-CM

## 2022-08-11 DIAGNOSIS — M54.2 NECK PAIN: Primary | ICD-10-CM

## 2022-08-11 PROCEDURE — 97161 PT EVAL LOW COMPLEX 20 MIN: CPT | Mod: GP

## 2022-08-11 PROCEDURE — 97110 THERAPEUTIC EXERCISES: CPT | Mod: GP

## 2022-08-11 NOTE — PROGRESS NOTES
"Answers for HPI/ROS submitted by the patient on 8/10/2022  Reason for Visit:: Neck and shoulder  When problem began:: 8/3/2022  How problem occurred:: Unsure  Number scale: 8/10  General health as reported by patient: good  Please check all that apply to your current or past medical history: none  Medical allergies: none  Surgeries: orthopedic surgery  Medications you are currently taking: high blood pressure medication, pain medication  What are your primary job tasks: computer work    Physical Therapy Initial Evaluation  8/11/2022     KEY PT FINDINGS:  1) Lack of cervical lordosis  2) Restricted cv AROM into extension & L rotation  3) No overt myotomal weakness or dermatomal impairments    Therapist Impression: Teddy is a 52 year old year old male referred to physical therapy by Dr. Jeffy Cabral for treatment of cervical radiculopathy. Patient presents to physical therapy with c/o acute-onset neck pain & stiffness with associated shoulder pain. Subjective history and objective findings are consistent with mechanical neck pain and possible radicular components. Due to these impairments, patient is unable to turn his head or look up, and has difficulty working at the computer & driving. Patient will benefit from skilled PT to address impairments/limitations in order to reach patient's goals, facilitate return to prior level of function, and maximize participation.    Precautions/Restrictions/MD instructions: E&T     Injury/Condition Details:  Presenting Complaint Neck and shoulder pain   Onset Timing/Date 8/3/22 (1 week ago)   Mechanism Insidious/unknown - no mechanism of injury   -started after playing hockey  -shoulder sore starting 4-6 weeks ago awkward position at work with mouse      Symptom Behavior Details    Primary Symptoms Right anterior shoulder pain, radiates to elbow  Neck is tight & sore  -right arm weakness  -thumb is numb \"I can't feel it\", tingling    Worst Pain 8/10 (with looking up, lifting " heavy)   Symptom Provocators Looking up, lifting, driving   Best Pain 8/10    Symptom Relievers Pulleys for shoulders  Muscle relaxers   Time of day dependent? No   Recent symptom change? symptoms improving     Prior Testing/Intervention for current condition:  Prior Tests none - MRI scheduled for tomorrow   Prior Treatment medication     Lifestyle & General Medical History:  Employment Work from home   Usual physical activities  (within past year) Hockey  Disc golf  [place]   Orthopaedic history Right humerus fracture ORIF - 8-10 years ago   Notable medical history See Epic Chart   Patient Reported Health good     Cervical Spine Objective    Posture/Observation   Forward Head: significant    Rounded Shoulders: mild  Cervical spine: max loss of cervical lordosis    Cervical AROM: (Major, Moderate, Minimal or Nil loss)  Movement Loss Natan Mod Min Nil Pain   Protrusion        Flexion  X      Retraction   X     Extension XX    'stiff' posterior & right neck   Left Rotation X    Stiff - R sided    Right Rotation  X      Left Side Bending X    Stiff- R sided   Right Side bending  X          Joint Mobility (passive physiologic motion):  Hypomobile central PA C4/5-C7/T1; Hypomobile R sideglides C4/5 - C7/T1 - very painful, provoke R tingling  L sideglides WNL    Upper Extremity Screen   Shoulder ROM: R shoulder AROM reduced compared to left, pain-limited    Cervical Strength   Assess next    Upper Extremity Strength  Shoulder MMT Flexion Scaption ER IR   Left 5/5 5/5 4+/5 4+/5   Right 4-/5 4-/5 4+/5 4+/5     Special Tests   Spurling's: unable to get into true test position; cv axial loading increases c-spine pain  Distraction: Negative incr in R arm tingling    Palpation  Moderate tenderness to palpation at R cervical paraspinals, R upper trap    Neurological:    Motor Deficit:  Myotomes L R   C4 (shoulder elevation) 5/5 4/5   C5 (shoulder abduction) 5/5 4+/5   C6 (wrist extension/ elbow flexion) 5/5 5/5   C7 (elbow  extension) 5/5 5/5   C8 (thumb extension/ finger flexion) 5/5 5/5   T1 (finger add/abd) 5/5 5/5     Sensory Deficit: intact to light touch symmetric  Dermatomes L R   C4 (medial acromion) - -   C5 (lateral elbow) - -   C6 (1st digit) - -   C7 (3rd digit) - -   C8 (5th digit) - -   T1 (medial elbow) - -     ULNT:   Median Bias: unable to get into true test position; provokes shoulder pain with passive ER & scap depression    Clusters: bold if (+) present     Cerv Radiculopathy Cerv Myelopathy   Spurling A Gait Deviation    Distraction  Hoffmans/Babinski     ULTT A Inverted Supinator    < 60 rot involved side Age >45      ASSESSMENT/PLAN    Patient is a 52 year old male with cervical and right side shoulder complaints.    Patient has the following significant findings with corresponding treatment plan.                Diagnosis 1:  Right shoulder & neck pain  Pain -  hot/cold therapy, US, electric stimulation, mechanical traction, manual therapy, splint/taping/bracing/orthotics, self management, education and home program  Decreased ROM/flexibility - manual therapy, therapeutic exercise, therapeutic activity and home program  Decreased joint mobility - manual therapy, therapeutic exercise, therapeutic activity and home program  Decreased strength - therapeutic exercise, therapeutic activities and home program  Impaired muscle performance - neuro re-education and home program  Decreased function - therapeutic activities and home program  Impaired posture - neuro re-education, therapeutic activities and home program    Therapy Evaluation Codes:   1) History comprised of:   Personal factors that impact the plan of care:      None.    Comorbidity factors that impact the plan of care are:      High blood pressure.     Medications impacting care: Anti-inflammatory, High blood pressure, Muscle relaxant and Pain.  2) Examination of Body Systems comprised of:   Body structures and functions that impact the plan of care:       Cervical spine, Shoulder and Thoracic Spine.   Activity limitations that impact the plan of care are:      Bending, Cooking, Driving, Lifting, Reading/Computer work, Working, Sleeping and Laying down.  3) Clinical presentation characteristics are:   Stable/Uncomplicated.  4) Decision-Making    Low complexity using standardized patient assessment instrument and/or measureable assessment of functional outcome.  Cumulative Therapy Evaluation is: Low complexity.    Previous and current functional limitations:  (See Goal Flow Sheet for this information)    Short term and Long term goals: (See Goal Flow Sheet for this information)     Communication ability:  Patient appears to be able to clearly communicate and understand verbal and written communication and follow directions correctly.  Treatment Explanation - The following has been discussed with the patient:   RX ordered/plan of care  Anticipated outcomes  Possible risks and side effects  This patient would benefit from PT intervention to resume normal activities.   Rehab potential is good.    Frequency:  1 X week, once daily  Duration:  for 4-6 weeks  Discharge Plan:  Achieve all LTG.  Independent in home treatment program.  Return to previous functional level by discharge.  Reach maximal therapeutic benefit.    Please refer to the daily flowsheet for treatment today, total treatment time and time spent performing 1:1 timed codes.

## 2022-08-12 ENCOUNTER — HOSPITAL ENCOUNTER (OUTPATIENT)
Dept: MRI IMAGING | Facility: CLINIC | Age: 52
Discharge: HOME | End: 2022-08-12
Attending: PAIN MEDICINE | Admitting: PAIN MEDICINE
Payer: COMMERCIAL

## 2022-08-12 DIAGNOSIS — M54.12 CERVICAL RADICULOPATHY: ICD-10-CM

## 2022-08-12 PROCEDURE — 72141 MRI NECK SPINE W/O DYE: CPT

## 2022-08-14 ENCOUNTER — LAB (OUTPATIENT)
Dept: LAB | Facility: CLINIC | Age: 52
End: 2022-08-14
Payer: COMMERCIAL

## 2022-08-14 DIAGNOSIS — I10 ESSENTIAL HYPERTENSION WITH GOAL BLOOD PRESSURE LESS THAN 140/90: ICD-10-CM

## 2022-08-14 DIAGNOSIS — Z12.5 SCREENING PSA (PROSTATE SPECIFIC ANTIGEN): ICD-10-CM

## 2022-08-14 DIAGNOSIS — E78.5 HYPERLIPIDEMIA LDL GOAL <130: ICD-10-CM

## 2022-08-14 DIAGNOSIS — E11.9 TYPE 2 DIABETES MELLITUS WITHOUT COMPLICATION, WITHOUT LONG-TERM CURRENT USE OF INSULIN (H): ICD-10-CM

## 2022-08-14 LAB — HBA1C MFR BLD: 6.9 % (ref 0–5.6)

## 2022-08-14 PROCEDURE — 80061 LIPID PANEL: CPT

## 2022-08-14 PROCEDURE — 80069 RENAL FUNCTION PANEL: CPT

## 2022-08-14 PROCEDURE — 83036 HEMOGLOBIN GLYCOSYLATED A1C: CPT

## 2022-08-14 PROCEDURE — 82043 UR ALBUMIN QUANTITATIVE: CPT

## 2022-08-14 PROCEDURE — G0103 PSA SCREENING: HCPCS

## 2022-08-14 PROCEDURE — 36415 COLL VENOUS BLD VENIPUNCTURE: CPT

## 2022-08-15 ENCOUNTER — MYC MEDICAL ADVICE (OUTPATIENT)
Dept: PALLIATIVE MEDICINE | Facility: CLINIC | Age: 52
End: 2022-08-15

## 2022-08-15 DIAGNOSIS — M54.12 CERVICAL RADICULOPATHY: Primary | ICD-10-CM

## 2022-08-15 LAB
ALBUMIN SERPL-MCNC: 4.3 G/DL (ref 3.4–5)
ANION GAP SERPL CALCULATED.3IONS-SCNC: 11 MMOL/L (ref 3–14)
BUN SERPL-MCNC: 20 MG/DL (ref 7–30)
CALCIUM SERPL-MCNC: 9.5 MG/DL (ref 8.5–10.1)
CHLORIDE BLD-SCNC: 105 MMOL/L (ref 94–109)
CHOLEST SERPL-MCNC: 126 MG/DL
CO2 SERPL-SCNC: 21 MMOL/L (ref 20–32)
CREAT SERPL-MCNC: 0.95 MG/DL (ref 0.66–1.25)
CREAT UR-MCNC: 145 MG/DL
FASTING STATUS PATIENT QL REPORTED: YES
GFR SERPL CREATININE-BSD FRML MDRD: >90 ML/MIN/1.73M2
GLUCOSE BLD-MCNC: 139 MG/DL (ref 70–99)
HDLC SERPL-MCNC: 32 MG/DL
LDLC SERPL CALC-MCNC: 63 MG/DL
MICROALBUMIN UR-MCNC: 12 MG/L
MICROALBUMIN/CREAT UR: 8.28 MG/G CR (ref 0–17)
NONHDLC SERPL-MCNC: 94 MG/DL
PHOSPHATE SERPL-MCNC: 2.7 MG/DL (ref 2.5–4.5)
POTASSIUM BLD-SCNC: 4.1 MMOL/L (ref 3.4–5.3)
PSA SERPL-MCNC: 1.04 UG/L (ref 0–4)
SODIUM SERPL-SCNC: 137 MMOL/L (ref 133–144)
TRIGL SERPL-MCNC: 157 MG/DL

## 2022-08-15 ASSESSMENT — ENCOUNTER SYMPTOMS
SORE THROAT: 0
COUGH: 0
HEMATURIA: 0
FEVER: 0
ARTHRALGIAS: 1
CHILLS: 0
HEARTBURN: 0
JOINT SWELLING: 0
NAUSEA: 0
DYSURIA: 0
ABDOMINAL PAIN: 0
EYE PAIN: 0
PARESTHESIAS: 0
MYALGIAS: 1
DIZZINESS: 0
NERVOUS/ANXIOUS: 0
FREQUENCY: 0
CONSTIPATION: 0
HEMATOCHEZIA: 0
WEAKNESS: 0
HEADACHES: 0
PALPITATIONS: 0
SHORTNESS OF BREATH: 0
DIARRHEA: 0

## 2022-08-15 NOTE — TELEPHONE ENCOUNTER
Per patient myChart message:  From: Vasyl Shah      Created: 8/15/2022 1:19 PM      Hi Dr. Cabral, will you be contacting me to discuss the MRI results from Friday or do I need to make a follow up appointment?            8/12/22: cervical MRI    Per Dr. Cabral's 8/8/22 med spine eval:  - Further procedures recommended:               - will first get imaging     Dr. Cabral out of the office.  Routed to covering providers to review.      Iris RN-BSN  [facility]

## 2022-08-15 NOTE — TELEPHONE ENCOUNTER
[employer] message below sent to patient.    Good afternoon Vasyl,    My name is Bia Lam and I am a nurse practitioner that works with Dr. Cabral. Dr. Cabral is out this week so I wanted to touch base with your results.     You do have substantial multilevel degenerative changes in your cervical spine, specifically moderate to severe spinal canal and foraminal stenosis (pinching of the large tube your nerves run through in your cervical spine and your nerve branches). This often causes radiating pain from your neck into your arms which I can see documented in your chart. These findings are most significant from C5-6 to C6-7. You also have some degeneration of your facet joints.     I do think that it would be reasonable to try an epidural steroid injection to see if this can provide some relief. I did go ahead and place an order to get the ball rolling. Dr. Cabral will review as well when he returns and let you know if he has any other thoughts or different recommendations.    FERNANDO Torres CNP [facility]     FERNANDO Torres CNP [facility]

## 2022-08-16 ENCOUNTER — TELEPHONE (OUTPATIENT)
Dept: PALLIATIVE MEDICINE | Facility: CLINIC | Age: 52
End: 2022-08-16

## 2022-08-16 NOTE — TELEPHONE ENCOUNTER
Screening Questions for Radiology Injections:    Injection to be done at which interventional clinic site? [facility]    Procedure ordered by Genaro     Procedure ordered? C7-T1 interlaminar epidural steroid injection    Transforaminal Cervical PADMINI - Send to [facility]) - No  Community Site providers perform this procedure    What insurance would patient like us to bill for this procedure? BC    Worker's comp or MVA (motor vehicle accident) -Any injection DO NOT SCHEDULE and route to Melody Khan.      [employer] insurance - For SI joint injections, DO NOT SCHEDULE and route to Iman Stratton.       ALL BCBS, Hum[employer] and [employer] CIGNA - DO NOT SCHEDULE and route to Iman Stratton    Is an  needed? No     Patient has a  home? (Review Grid) YES: Informed     Any chance of pregnancy? Not Applicable   If YES, do NOT schedule and route to RN pool    Is patient actively being treated for cancer or immunocompromised? No  If YES, do NOT schedule and route to RN pool     Does the patient have a bleeding or clotting disorder? No     If YES, okay to schedule AND route to RN nurse pool. (For any patients with platelet count <100, RN must forward to provider)    Is patient taking any Blood Thinners OR Antiplatelet medication?  No   If hold needed, do NOT schedule, route to RN pool    Examples:   o Blood Thinners: (Coumadin, Warfarin, Jantoven, Pradaxa, Xarelto, Eliquis, Edoxaban, Enoxaparin, Lovenox, Heparin, Arixtra, Fondaparinux or Fragmin)  o Antiplatelet Medications: (Plavix, Brilinta or Effient)     Is patient taking any aspirin products (includes Excedrin and Fiorinal)? No     If more than 325mg/day, OK to schedule; Instruct Pt to decrease to less than 325 mg for 7 days AND route to RN pool    For CERVICAL procedures, hold all aspirin products for 6 days.     Tell Pt that if aspirin product is not held for 6 days, the procedure WILL BE cancelled.     Any allergies to contrast  dye, iodine, shellfish, or numbing and steroid medications? No    If YES, schedule and add allergy information to appointment notes AND route to the RN pool     If PADMINI and Contrast Dye / Iodine Allergy? DO NOT SCHEDULE, route to RN pool    Allergies: Patient has no known allergies.     Does patient have an active infection or treated for one within the past week? No    Is patient currently taking any antibiotics or steroid medications?  No     For patients on chronic, preventative, or prophylactic antibiotics, procedures may be scheduled.     For patients on antibiotics for active or recent infection, schedule 4 days after completed.    For patients on steroid medications, schedule 4 days after completed.     Has the patient had a flu shot or any other vaccinations within the past 7 days? No  If yes, explain that for the vaccine to work best they need to:       wait 1 week before and 1 week after getting any Vaccine    wait 1 week before and 2 weeks after getting Covid Vaccine #2 or BOOSTER    If patient has concerns about the timing, send to RN pool     Does patient have an MRI/CT?  YES: 2022 Include Date and Check Procedure Scheduling Grid to see if required.    Was the MRI/CT done within the last 3 years?  Yes     If no route to RN Pool    If yes, where was the MRI/CT done? [facility]      Refer to PACS Transmissions list for approved external locations and route to RN Pool High Priority    If MRI was not done at approved external location do NOT schedule and route to RN pool.      If patient has an imaging disc, the injection MAY be scheduled but patient must bring disc to appt or appt will be cancelled.    Procedure Specific Instructions:    If celiac plexus block, informed patient NPO for 6 hours and that it is okay to take medications with sips of water, especially blood pressure medications Not Applicable         If this is for a cervical procedure, informed patient that aspirin needs to be held for 6 days.   Not  Applicable      Sedation, If Sedation is ordered for any procedure, patient must be NPO for 6 hours prior to procedure Not Applicable      If IV needed:    Do not schedule procedures requiring IV placement in the first appointment of the day or first appointment after lunch. Do NOT schedule at 0745, 0815 or 1245.     Instructed patient to arrive 30 minutes early for IV start if required. (Check Procedure Scheduling Grid)  Not Applicable    Reminders:    If you are started on any steroids or antibiotics between now and your appointment, you must contact us because the procedure may need to be cancelled.  Yes      As a reminder, receiving steroids can decrease your body's ability to fight infection.   Would you still like to move forward with scheduling the injection?  Yes      IV Sedation is not provided for procedures. If oral anti-anxiety medication is needed, the patient should request this from their referring provider.      Instruct patient to arrive as directed prior to the scheduled appointment time:  Ernestine: 30 minutes before; if IV needed 1 hour before       For patients 85 or older we recommend having an adult stay w/ them for the remainder of the day.       If the patient is Diabetic, remind them to bring their glucometer.      Does the patient have any questions?  NO  Lucrecia Aiken  [facility]

## 2022-08-17 NOTE — TELEPHONE ENCOUNTER
From: Harshil Rondon RN      Created: 8/17/2022 1:18 PM        Dr. Marianna Fallon reviewed your MyChart.  He agrees with Bia Lam.  Schedule the epidural, if not improved after the injection he would have you strongly consider a surgical referral.     Thanks     Harshil Rondon RN  Patient Care Supervisor   [facility] [place]

## 2022-08-18 ENCOUNTER — OFFICE VISIT (OUTPATIENT)
Dept: FAMILY MEDICINE | Facility: CLINIC | Age: 52
End: 2022-08-18
Payer: COMMERCIAL

## 2022-08-18 VITALS
BODY MASS INDEX: 26.13 KG/M2 | TEMPERATURE: 98.1 F | RESPIRATION RATE: 14 BRPM | DIASTOLIC BLOOD PRESSURE: 77 MMHG | HEIGHT: 76 IN | SYSTOLIC BLOOD PRESSURE: 138 MMHG | HEART RATE: 61 BPM | WEIGHT: 214.6 LBS | OXYGEN SATURATION: 99 %

## 2022-08-18 DIAGNOSIS — Z00.00 ROUTINE GENERAL MEDICAL EXAMINATION AT A HEALTH CARE FACILITY: Primary | ICD-10-CM

## 2022-08-18 DIAGNOSIS — M54.12 CERVICAL RADICULOPATHY: ICD-10-CM

## 2022-08-18 DIAGNOSIS — E11.9 TYPE 2 DIABETES MELLITUS WITHOUT COMPLICATION, WITHOUT LONG-TERM CURRENT USE OF INSULIN (H): ICD-10-CM

## 2022-08-18 DIAGNOSIS — I10 ESSENTIAL HYPERTENSION WITH GOAL BLOOD PRESSURE LESS THAN 140/90: ICD-10-CM

## 2022-08-18 DIAGNOSIS — E78.5 DYSLIPIDEMIA: ICD-10-CM

## 2022-08-18 PROCEDURE — 99214 OFFICE O/P EST MOD 30 MIN: CPT | Mod: 25 | Performed by: FAMILY MEDICINE

## 2022-08-18 PROCEDURE — 99396 PREV VISIT EST AGE 40-64: CPT | Performed by: FAMILY MEDICINE

## 2022-08-18 RX ORDER — ATORVASTATIN CALCIUM 20 MG/1
20 TABLET, FILM COATED ORAL DAILY
Qty: 90 TABLET | Refills: 1 | Status: SHIPPED | OUTPATIENT
Start: 2022-08-18 | End: 2023-03-03

## 2022-08-18 RX ORDER — LOSARTAN POTASSIUM AND HYDROCHLOROTHIAZIDE 25; 100 MG/1; MG/1
1 TABLET ORAL DAILY
Qty: 90 TABLET | Refills: 1 | Status: SHIPPED | OUTPATIENT
Start: 2022-08-18 | End: 2023-03-03

## 2022-08-18 RX ORDER — AMLODIPINE BESYLATE 5 MG/1
5 TABLET ORAL DAILY
Qty: 90 TABLET | Refills: 1 | Status: SHIPPED | OUTPATIENT
Start: 2022-08-18 | End: 2023-03-03

## 2022-08-18 ASSESSMENT — ENCOUNTER SYMPTOMS
DYSURIA: 0
MYALGIAS: 1
DIZZINESS: 0
SORE THROAT: 0
COUGH: 0
PARESTHESIAS: 0
HEMATURIA: 0
HEMATOCHEZIA: 0
DIARRHEA: 0
ABDOMINAL PAIN: 0
WEAKNESS: 0
SHORTNESS OF BREATH: 0
JOINT SWELLING: 0
HEADACHES: 0
CHILLS: 0
ARTHRALGIAS: 1
NERVOUS/ANXIOUS: 0
FREQUENCY: 0
FEVER: 0
NAUSEA: 0
CONSTIPATION: 0
PALPITATIONS: 0
EYE PAIN: 0
HEARTBURN: 0

## 2022-08-18 ASSESSMENT — PAIN SCALES - GENERAL: PAINLEVEL: EXTREME PAIN (8)

## 2022-08-18 NOTE — PROGRESS NOTES
SUBJECTIVE:   CC: Teddy Shah is an 52 year old male who presents for preventative health visit.   Follow-up htn, high cholesterol,cervical radiculopathy and dm.  Outside blood pressure readings - can be higher with neck pain - seeing specialist. Await cortisone shot.   Hockey x2/week and dirt bikes and disc golf. Doing p.t. and muscles relaxer.   Pain right shoulder. 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   No chest pain or shortness of breath. No nausea, vomiting or diarrhea or black/bloody stools.   No urine changes or hematuria. No testicle pain/masses/hernia. Eye exam 2 years. Taking vitamind.  wife in [facility].   No rashes or mole changes.   Some ear ringing. No balance ok and hearing ok.   Cutting down on diet pop. Rare ALCOHOL.     Patient has been advised of split billing requirements and indicates understanding: Yes  Healthy Habits:     Getting at least 3 servings of Calcium per day:  NO    Bi-annual eye exam:  Yes    Dental care twice a year:  Yes    Sleep apnea or symptoms of sleep apnea:  None and Excessive snoring    Diet:  Regular (no restrictions)    Frequency of exercise:  2-3 days/week    Duration of exercise:  15-30 minutes    Taking medications regularly:  Yes    Medication side effects:  Not applicable    PHQ-2 Total Score: 0    Additional concerns today:  Yes        Today's PHQ-2 Score:   PHQ-2 ( 1999 Pfizer) 8/15/2022   Q1: Little interest or pleasure in doing things 0   Q2: Feeling down, depressed or hopeless 0   PHQ-2 Score 0   PHQ-2 Total Score (12-17 Years)- Positive if 3 or more points; Administer PHQ-A if positive -   Q1: Little interest or pleasure in doing things Not at all   Q2: Feeling down, depressed or hopeless Not at all   PHQ-2 Score 0       Abuse: Current or Past(Physical, Sexual or Emotional)- No  Do you feel safe in your environment? Yes        Social History     Tobacco Use     Smoking status: Never Smoker     Smokeless tobacco: Never Used     Tobacco comment: Lives in smoke  "free household   Substance Use Topics     Alcohol use: Yes     Comment: occasional         Alcohol Use 8/15/2022   Prescreen: >3 drinks/day or >7 drinks/week? No   Prescreen: >3 drinks/day or >7 drinks/week? -       Last PSA:   PSA   Date Value Ref Range Status   03/01/2021 1.71 0 - 4 ug/L Final     Comment:     Assay Method:  Chemiluminescence using Siemens Vista analyzer     Prostate Specific Antigen Screen   Date Value Ref Range Status   08/14/2022 1.04 0.00 - 4.00 ug/L Final       Reviewed orders with patient. Reviewed health maintenance and updated orders accordingly - Yes  Lab work is in process    Reviewed and updated as needed this visit by clinical staff                    Reviewed and updated as needed this visit by Provider                       Review of Systems   Constitutional: Negative for chills and fever.   HENT: Negative for congestion, ear pain, hearing loss and sore throat.    Eyes: Negative for pain and visual disturbance.   Respiratory: Negative for cough and shortness of breath.    Cardiovascular: Negative for chest pain, palpitations and peripheral edema.   Gastrointestinal: Negative for abdominal pain, constipation, diarrhea, heartburn, hematochezia and nausea.   Genitourinary: Negative for dysuria, frequency, genital sores, hematuria, impotence, penile discharge and urgency.   Musculoskeletal: Positive for arthralgias and myalgias. Negative for joint swelling.   Skin: Negative for rash.   Neurological: Negative for dizziness, weakness, headaches and paresthesias.   Psychiatric/Behavioral: Negative for mood changes. The patient is not nervous/anxious.        OBJECTIVE:   /77   Pulse 61   Temp 98.1  F (36.7  C) (Oral)   Resp 14   Ht 1.93 m (6' 4\")   Wt 97.3 kg (214 lb 9.6 oz)   SpO2 99%   BMI 26.12 kg/m     Physical Exam  GENERAL: healthy, alert and no distress  EYES: Eyes grossly normal to inspection, PERRL and conjunctivae and sclerae normal  HENT: ear canals and TM's normal, " nose and mouth without ulcers or lesions  NECK: no adenopathy, no asymmetry, masses, or scars and thyroid normal to palpation  RESP: lungs clear to auscultation - no rales, rhonchi or wheezes  BREAST: normal without masses, tenderness or nipple discharge and no palpable axillary masses or adenopathy  CV: regular rate and rhythm, normal S1 S2, no S3 or S4, no murmur, click or rub, no peripheral edema and peripheral pulses strong  ABDOMEN: soft, nontender, no hepatosplenomegaly, no masses and bowel sounds normal  : patient deferred /rectal exams  MS: no gross musculoskeletal defects noted, no edema  SKIN: no suspicious lesions or rashes  NEURO: Normal strength and tone, mentation intact and speech normal  PSYCH: mentation appears normal, affect normal/bright  LYMPH: no cervical, supraclavicular, axillary adenopathy    ASSESSMENT/PLAN:   ASSESSMENT / PLAN:  (Z00.00) Routine general medical examination at a health care facility  (primary encounter diagnosis)  Comment: generally healthy and normal  exam  Plan: Reviewed self mole/testicle check handout.  Call/email with questions/concerns.   Some ear ringing - cut down on caffeine. ent if worse. Continue vitaminD    (M54.12) Cervical radiculopathy  Comment: needs help  Plan: continue p.t. and meds. Can double gabapentin. Consider cortisone shot.     (E11.9) Type 2 diabetes mellitus without complication, without long-term current use of insulin (H)  Comment: stable  Plan: consider metformin if worse. Continue diet/exercise. Follow-up eye exam this fall    (I10) Essential hypertension with goal blood pressure less than 140/90  Comment: stable  Plan: amLODIPine (NORVASC) 5 MG tablet,         losartan-hydrochlorothiazide (HYZAAR) 100-25 MG        tablet        Continue self-monitor. Make combo pil of cozaar and hydrochlorothiazide. Chest pain or shortness of breath to er. Recheck in 6 months  Sooner if worse    (E78.5) Dyslipidemia    Plan: atorvastatin (LIPITOR) 20 MG  "tablet        Some body aches at times. Can try 1/2 pill.         Patient has been advised of split billing requirements and indicates understanding: Yes    COUNSELING:   Reviewed preventive health counseling, as reflected in patient instructions       Regular exercise       Healthy diet/nutrition       Vision screening       Aspirin prophylaxis        Alcohol Use        Safe sex practices/STD prevention       Colorectal cancer screening       Prostate cancer screening       Osteoporosis prevention/bone health    Estimated body mass index is 26.29 kg/m  as calculated from the following:    Height as of 8/4/22: 1.93 m (6' 4\").    Weight as of 8/4/22: 98 kg (216 lb).     Weight management plan: Discussed healthy diet and exercise guidelines    He reports that he has never smoked. He has never used smokeless tobacco.      Counseling Resources:  ATP IV Guidelines  Pooled Cohorts Equation Calculator  FRAX Risk Assessment  ICSI Preventive Guidelines  Dietary Guidelines for Americans, 2010  USDA's MyPlate  ASA Prophylaxis  Lung CA Screening    Hasmukh Vick MD  [facility]  "

## 2022-08-19 ENCOUNTER — THERAPY VISIT (OUTPATIENT)
Dept: PHYSICAL THERAPY | Facility: CLINIC | Age: 52
End: 2022-08-19
Payer: COMMERCIAL

## 2022-08-19 DIAGNOSIS — M25.519 SHOULDER PAIN: ICD-10-CM

## 2022-08-19 DIAGNOSIS — M54.12 CERVICAL RADICULOPATHY: Primary | ICD-10-CM

## 2022-08-19 DIAGNOSIS — M54.2 NECK PAIN: ICD-10-CM

## 2022-08-19 PROCEDURE — 97140 MANUAL THERAPY 1/> REGIONS: CPT | Mod: GP

## 2022-08-19 PROCEDURE — 97110 THERAPEUTIC EXERCISES: CPT | Mod: GP

## 2022-08-22 NOTE — TELEPHONE ENCOUNTER
[facility]    Phone Message    May a detailed message be left on voicemail: yes     Reason for Call: Requesting Results   Name/type of test: MR CERVICAL SPINE WO  Date of test: 8/12/22  Was test done at a location other than [facility] (Please fill in the location if not [facility])?: No    Other: Vasyl calling to request a call back to discuss his MRI results and if his injection was approved by his insurance.     Action Taken: Message routed to:  Other: [facility]    Travel Screening: Not Applicable

## 2022-08-26 NOTE — TELEPHONE ENCOUNTER
Call back to pt.     Pt stated that he would like to investigate the Neuro surgery consult.      Pt stated that he will be reaching his out of picket max this year and if he need this done he would like it this year.     Writer reviewed that Neuro surgery may ask Pt to complete the PADMINI first as a less invasive option, Pt is scheduled for PADMINI on 9/23 and Pt would like to keep this but get rolling with Neuro surg in case it is not helpful.     Order pended.     Harshil Rondon, RN  Patient Care Supervisor   [facility] [place]

## 2022-08-26 NOTE — TELEPHONE ENCOUNTER
Reason for call:  Other    Patient called regarding (reason for call): call back  Additional comments: Pt is requesting a call back from nursing or Dr. Cabral to discuss some concerns and to go over the results of his MRI. Please call pt back for advice. Please see pt My chart message.    Phone number to reach patient:  Home number on file [phone] (home)    Best Time:  Any    Can we leave a detailed message on this number?  YES    Diamante Yap      [facility]  Pain Management

## 2022-08-26 NOTE — TELEPHONE ENCOUNTER
No PA required, okay to schedule      Iman PENNY    [facility]

## 2022-08-30 ENCOUNTER — THERAPY VISIT (OUTPATIENT)
Dept: PHYSICAL THERAPY | Facility: CLINIC | Age: 52
End: 2022-08-30
Payer: COMMERCIAL

## 2022-08-30 DIAGNOSIS — M54.12 CERVICAL RADICULOPATHY: ICD-10-CM

## 2022-08-30 PROCEDURE — 97140 MANUAL THERAPY 1/> REGIONS: CPT | Mod: GP | Performed by: PHYSICAL THERAPIST

## 2022-08-30 PROCEDURE — 97110 THERAPEUTIC EXERCISES: CPT | Mod: GP | Performed by: PHYSICAL THERAPIST

## 2022-09-06 ENCOUNTER — THERAPY VISIT (OUTPATIENT)
Dept: PHYSICAL THERAPY | Facility: CLINIC | Age: 52
End: 2022-09-06
Payer: COMMERCIAL

## 2022-09-06 DIAGNOSIS — M54.12 CERVICAL RADICULOPATHY: Primary | ICD-10-CM

## 2022-09-06 PROCEDURE — 97140 MANUAL THERAPY 1/> REGIONS: CPT | Mod: GP | Performed by: PHYSICAL THERAPIST

## 2022-09-06 PROCEDURE — 97110 THERAPEUTIC EXERCISES: CPT | Mod: GP | Performed by: PHYSICAL THERAPIST

## 2022-09-13 ENCOUNTER — THERAPY VISIT (OUTPATIENT)
Dept: PHYSICAL THERAPY | Facility: CLINIC | Age: 52
End: 2022-09-13
Payer: COMMERCIAL

## 2022-09-13 DIAGNOSIS — M54.12 CERVICAL RADICULOPATHY: Primary | ICD-10-CM

## 2022-09-13 PROCEDURE — 97140 MANUAL THERAPY 1/> REGIONS: CPT | Mod: GP | Performed by: PHYSICAL THERAPIST

## 2022-09-20 DIAGNOSIS — M54.12 CERVICAL RADICULOPATHY: Primary | ICD-10-CM

## 2022-09-20 NOTE — TELEPHONE ENCOUNTER
DIAGNOSIS: Cervical Radiculopathy   APPOINTMENT DATE: 09/26/2022   NOTES STATUS DETAILS   OFFICE NOTE from referring provider Internal 08/08/2022 - Jeffy Cabral MD - [facility]     OFFICE NOTE from other specialist Internal [facility] PT -   09/13/2022 to 08/11/2022 08/18/2022 - Hasmukh Vick MD - [facility] FP   DISCHARGE REPORT from the ER Internal 08/06/2022, 08/04/2022 - [facility] ED   MEDICATION LIST Internal    LABS     MRI Internal 08/12/2022 - C Spine   XRAYS (IMAGES & REPORTS) Internal 12/23/2014, 02/17/2011 - Chest

## 2022-09-23 ENCOUNTER — RADIOLOGY INJECTION OFFICE VISIT (OUTPATIENT)
Dept: PALLIATIVE MEDICINE | Facility: CLINIC | Age: 52
End: 2022-09-23
Attending: NURSE PRACTITIONER
Payer: COMMERCIAL

## 2022-09-23 VITALS
OXYGEN SATURATION: 98 % | RESPIRATION RATE: 16 BRPM | SYSTOLIC BLOOD PRESSURE: 134 MMHG | HEART RATE: 65 BPM | DIASTOLIC BLOOD PRESSURE: 78 MMHG

## 2022-09-23 DIAGNOSIS — M54.12 CERVICAL RADICULOPATHY: ICD-10-CM

## 2022-09-23 PROCEDURE — 62321 NJX INTERLAMINAR CRV/THRC: CPT | Performed by: PAIN MEDICINE

## 2022-09-23 RX ORDER — DEXAMETHASONE SODIUM PHOSPHATE 10 MG/ML
10 INJECTION, SOLUTION INTRAMUSCULAR; INTRAVENOUS ONCE
Status: COMPLETED | OUTPATIENT
Start: 2022-09-23 | End: 2022-09-23

## 2022-09-23 RX ADMIN — DEXAMETHASONE SODIUM PHOSPHATE 10 MG: 10 INJECTION, SOLUTION INTRAMUSCULAR; INTRAVENOUS at 09:46

## 2022-09-23 ASSESSMENT — PAIN SCALES - GENERAL
PAINLEVEL: NO PAIN (1)
PAINLEVEL: MILD PAIN (2)

## 2022-09-23 NOTE — PROGRESS NOTES
[facility] - Procedure Note    Date of Visit: 9/23/2022    Procedure performed: C7-T1 interlaminar epidural steroid injection with fluoroscopic guidance  Diagnosis: Cervical radiculitis/radiculopathy  : Jeffy Cabral MD  Anesthesia: none    Indications: Teddy Shah is a 52 year old male who is seen at for cervical epidural steroid injection. The patient describes right side neck pain radiating to his ue. The patient has been exhibiting symptoms consistent with cervical intraspinal inflammation and radiculopathy. Symptoms have been persistent, disabling, and intermittently severe. The patient reports minimal improvement with conservative treatment, including meds/pt.    Cervical MRI reviewed    Allergies:    No Known Allergies     Vitals:  /78   Pulse 65   Resp 16   SpO2 98%     Review of Systems: The patient denies recent fever, chills, illness, use of antibiotics or anticoagulants. All other 10-point review of systems negative.     Procedure: The procedure and risks were explained, and informed written consent was obtained from the patient. Risks include but are not limited to: infection, bleeding, increased pain, and damage to soft tissue, nerve, muscle, and vasculature structures. After getting informed consent, patient was brought into the procedure suite and was placed in a prone position on the procedure table. A Pause for the Cause was performed. Patient was prepped and draped in sterile fashion.     The C7-T1 interspace was identified with use of fluoroscopy in AP view. A 25-gauge, 1.5 inch needle was used to anesthetize the skin and subcutaneous tissue entry site with a total of 2 ml of 1% lidocaine. Under fluoroscopic visualization, a 22-gauge, 3.5 inch Tuohy epidural needle was slowly advanced towards the epidural space a few millimeters right of midline. The latter part of the needle advancement was guided with fluoroscopy in the lateral view. The epidural  space was identified using loss of resistance technique. After negative aspiration for heme and cerebrospinal fluid, a total of 1 mL of Omnipaque was injected to confirm needle placement. 9 mL of contrast was wasted. Epidurogram confirmed spread within the posterior epidural space. 2 ml of  NS,1 ml 10mg/ml of dexamethasone, and 1 ml of preservative free 0.25% bupivacaine was injected. The needle was removed.  Images were saved to PACS.    The patient tolerated the procedure well, and there was no evidence of procedural complications. No new sensory or motor deficits were noted following the procedure. The patient was stable and able to ambulate on discharge home. Post-procedure instructions were provided.     Pre-procedure pain score: 2/10 in the neck, 2/10 in the arm  Post-procedure pain score: 1/10 in the neck, 1/10 in the arm    Assessment/Plan: Teddy Shah is a 52 year old male s/p cervical interlaminar epidural steroid injection today for cervical spondylosis and radiculitis/radiculopathy.     Following today's procedure, the patient was advised to contact the [facility] for any of the following:   Fever, chills, or night sweats   New onset of pain, numbness, or weakness   Any questions/concerns regarding the procedure  If unable to contact the Pain Center, the patient was instructed to go to a local Emergency Room for any complications.     Follow-up with provider in 8 weeks for post-procedure evaluation.    Jeffy Cabral MD   Pain Management    [facility]

## 2022-09-23 NOTE — NURSING NOTE
Pre-procedure Intake  If YES to any questions or NO to having a   Please complete laminated checklist and leave on the computer keyboard for Provider, verbally inform provider if able.    For SCS Trial, RFA's or any sedation procedure:  Have you been fasting? NA    If yes, for how long?     Are you taking any any blood thinners such as Coumadin, Warfarin, Jantoven, Pradaxa Xarelto, Eliquis, Edoxaban, Enoxaparin, Lovenox, Heparin, Arixtra, Fondaparinux, or Fragmin? OR Antiplatelet medication such as Plavix, Brilinta, or Effient?   No     If yes, when did you take your last dose?     Do you take aspirin?  No    If cervical procedure, have you held aspirin for 6 days?      Do you have any allergies to contrast dye, iodine, steroid and/or numbing medications?  NO    Are you currently taking antibiotics or have an active infection?  NO    Have you had a fever/elevated temperature within the past week? NO    Are you currently taking oral steroids? NO    Do you have a ? Yes    Are you pregnant or breastfeeding?  Not Applicable    Have you received the COVID-19 vaccine? Yes    If yes, was it your 1st, 2nd or only dose needed?     Date of most recent vaccine: 1/3/22    Notify provider and RNs if systolic BP >170, diastolic BP >100, P >100 or O2 sats < 90%

## 2022-09-23 NOTE — NURSING NOTE
Discharge Information    IV Discontiued Time:  NA    Amount of Fluid Infused:  NA    Discharge Criteria = When patient returns to baseline or as per MD order    Consciousness:  Pt is fully awake    Circulation:  BP +/- 20% of pre-procedure level    Respiration:  Patient is able to breathe deeply    O2 Sat:  Patient is able to maintain O2 Sat >92% on room air    Activity:  Moves 4 extremities on command    Ambulation:  Patient is able to stand and walk or stand and pivot into wheelchair    Dressing:  Clean/dry or No Dressing    Notes:   Discharge instructions and AVS given to patient    Patient meets criteria for discharge?  YES    Admitted to PCU?  No    Responsible adult present to accompany patient home?  Yes    Signature/Title:    Harshil Rondon RN  RN Care Coordinator  [facility]

## 2022-09-23 NOTE — PATIENT INSTRUCTIONS
[facility]   Procedure Discharge Instructions    Today you saw:  Dr. Jeffy Cabral    You had an:  lumbar Epidural steroid injection       Medications used:  Lidocaine   Bupivacaine   Dexamethasone Omnipaque         If you were holding your blood thinning medication, please restart taking it: N/A  Be cautious when walking. Numbness and/or weakness in the lower extremities may occur for up to 6-8 hours after the procedure due to effect of the local anesthetic  Do not drive for 6 hours. The effect of the local anesthetic could slow your reflexes.   You may resume your regular activities after 24 hours  Avoid strenuous activity for the first 24 hours  You may shower, however avoid swimming, tub baths or hot tubs for 24 hours following your procedure  You may have a mild to moderate increase in pain for several days following the injection.  It may take up to 14 days for the steroid medication to start working although you may feel the effect as early as a few days after the procedure.     You may use ice packs for 10-15 minutes, 3 to 4 times a day at the injection site for comfort  Do not use heat to painful areas for 6 to 8 hours. This will give the local anesthetic time to wear off and prevent you from accidentally burning your skin.   Unless you have been directed to avoid the use of anti-inflammatory medications (NSAIDS), you may use medications such as ibuprofen, Aleve or Tylenol for pain control if needed.   Possible side effects of steroids that you may experience include flushing, elevated blood pressure, increased appetite, mild headaches and restlessness.  All of these symptoms will get better with time.  If you experience any of the following, call the Pain Clinic during work hours (Mon-Friday 8-4:30 pm) at [phone] or the Provider Line after hours at [phone]:  -Fever over 100 degree F  -Swelling, bleeding, redness, drainage, warmth at the injection  site  -Progressive weakness or numbness in your arms  -Unusual headache that is not relieved by Tylenol or other pain reliever  -Unusual new onset of pain that is not improving

## 2022-09-26 ENCOUNTER — PRE VISIT (OUTPATIENT)
Dept: ORTHOPEDICS | Facility: CLINIC | Age: 52
End: 2022-09-26

## 2022-09-26 ENCOUNTER — ANCILLARY PROCEDURE (OUTPATIENT)
Dept: GENERAL RADIOLOGY | Facility: CLINIC | Age: 52
End: 2022-09-26
Attending: ORTHOPAEDIC SURGERY
Payer: COMMERCIAL

## 2022-09-26 ENCOUNTER — OFFICE VISIT (OUTPATIENT)
Dept: ORTHOPEDICS | Facility: CLINIC | Age: 52
End: 2022-09-26
Payer: COMMERCIAL

## 2022-09-26 VITALS — BODY MASS INDEX: 25.94 KG/M2 | WEIGHT: 213 LBS | HEIGHT: 76 IN

## 2022-09-26 DIAGNOSIS — M54.12 CERVICAL RADICULOPATHY: ICD-10-CM

## 2022-09-26 DIAGNOSIS — M48.02 CERVICAL STENOSIS OF SPINE: Primary | ICD-10-CM

## 2022-09-26 PROCEDURE — 99204 OFFICE O/P NEW MOD 45 MIN: CPT | Performed by: ORTHOPAEDIC SURGERY

## 2022-09-26 PROCEDURE — 72050 X-RAY EXAM NECK SPINE 4/5VWS: CPT | Mod: TC | Performed by: RADIOLOGY

## 2022-09-26 NOTE — NURSING NOTE
"Reason For Visit:   Chief Complaint   Patient presents with     Consult     cervical radiculopahty/Dr. Cabral       Primary MD: Hasmukh Vick  Ref. MD: Dr. Cabral    ?  No  Occupation .  Currently working? Yes.  Work status?  Full time.    Date of injury: No  Type of injury: No.    Date of surgery: No  Type of surgery: No.    Smoker: No  Request smoking cessation information: No    Ht 1.93 m (6' 4\")   Wt 96.6 kg (213 lb)   BMI 25.93 kg/m      Pain Assessment  Patient Currently in Pain: Yes  0-10 Pain Scale: 2  Primary Pain Location: Neck    Oswestry (MARLI) Questionnaire    No flowsheet data found.         Neck Disability Index (NDI) Questionnaire    Neck Disability Index (NDI) 9/26/2022   Neck Disability Index: Count 10   NDI: Total Score = SUM (points for all 10 findings) 17   Neck Disability in Percent = (Total Score) / 50 * 100 34 (%)   Some recent data might be hidden              Visual Analog Pain Scale  Back Pain Scale 0-10: 0  Right leg pain: 0  Left leg pain: 0  Neck Pain Scale 0-10: 2  Right arm pain: 0 (tingling and numbness in arm and thumb)  Left arm pain: 0    Promis 10 Assessment    No flowsheet data found.             Princess Alexandra LPN  "

## 2022-09-26 NOTE — LETTER
9/26/2022         RE: Teddy Shah  [address] [address] [address]        Dear Colleague,    Thank you for referring your patient, Teddy Shah, to the [facility]. Please see a copy of my visit note below.    Patient is an active 52-year-old male who currently is experiencing couple months of worsening neck pain with radiation to the right shoulder as well as down into the head to the primarily C6 distribution.  He very plays ice hockey and does golf.  He states that roughly 6 weeks ago he was playing hockey and had acute onset pain rating to the superior lateral shoulder on the right side which is only relieved with holding his arm up above his head.  He presented to the emergency department where he was counseled that he thought it was something to do around the parascapular region and they provided a injection which did not really help.  He subsequently presented to the urgent care clinic who referred him to Dr. Cabral.  He had an MRI which showed degenerative disc disease 3-4, C5-6 and C6/7 with foraminal stenosis right greater than left.  There is also cord flattening at these levels.  He subsequently had translaminar C7-T1 injection with  which has provided some relief.  He scheduled this appointment today to discuss findings of his MRI as well as potential treatment or activity restrictions which would be necessary.    Denies any loss of manual dexterity or balance.  No bowel or bladder symptoms.    On examination today he is alert and oriented no acute distress.  He walks without antalgic gait.  No evidence of dysmetria.  He has 30 degrees of cervical flexion extension 50 degrees of right and left lateral rotation.  He does have a positive Spurling's which recreates pain radiating to the posterior lateral aspect of the shoulder and tingling in the C6 distribution.  Resisted strength testing reveals 5/5 strength with shoulder elevation, shoulder  abduction.  I do think there is a slight decrease in strength with right elbow flexion and elbow extension.  He has symmetric 5/5 strength with wrist flexion extension finger abduction composite  bilaterally.      Sensation is subjectively decreased in the right C4 distribution otherwise intact C5-T1 bilaterally.  He has 2+ bicep tricep brachioradialis reflexes bilaterally.  He has a positive Martha's right greater than left.  He has 2+ patella tendon Achilles tendon bilaterally.  Downgoing Babinski bilaterally and 1 beat of clonus bilaterally.    AP lateral flexion-extension views of the cervical spine were obtained which did not straight no evidence of spondylolisthesis or dynamic instability in the cervical spine.  There is prominent anterior osteophytes at the C6-7 disc space as well as posterior disc osteophyte complex.    MRI of the cervical spine was reviewed with the patient notable for disc herniation at 3-4 with central stenosis as well as moderate foraminal stenosis.  At C5-6 there is disc herniation/osteophyte complex which results in mild central stenosis moderate foraminal stenosis bilaterally.  At C6-7 there is large disc osteophyte complex resulting in moderate central and moderate to severe foraminal stenosis bilaterally.  There is no evidence of cord signal change        Impression and plan:  52-year-old male with at least 6 weeks of progressive cervical radiculopathy without myelopathic symptoms.  He does have positive Martha's right greater than left and some mild weakness with right bicep and tricep.  He has had relief after translaminar cervical epidural steroid injection.  Counseled patient on risk of further injury with progressive neurologic dysfunction given his imaging and symptoms.  Did  that there is risk of spinal cord injury with an acute trauma given presence of cervical stenosis.  At the same time I think it is reasonable to continue with activities for wellness and  recreation.  Counseled that continued nonoperative measures are reasonable I do not see any urgent indication for surgery at this time.  If symptoms do progress he develop progressive pain weakness dexterity or balance problems in my mind that would be an indication for surgical decompression.  Given the degree of spondylosis at C6-7 I would recommend anterior cervical discectomy and fusion at that level.  Does not have the same degree of spondylosis at C5-6 and I think this could be approached by a anterior cervical disc replacement preserve motion at that level potentially decreasing the risk of adjacent level disease.  Discussed that we could either schedule a follow-up in 3 to 4 months to follow-up where he could contact me as needed.  He would prefer to follow-up on as-needed basis which I think is reasonable.  He can contact by MyChart or telephone should become warranted.  45 minutes was spent on this encounter today including chart review history physical examination and patient counseling.    Tito Swenson MD  Orthopedic Spine Surgeon  , Department of Orthopedic Surgery

## 2022-09-26 NOTE — PROGRESS NOTES
Patient is an active 52-year-old male who currently is experiencing couple months of worsening neck pain with radiation to the right shoulder as well as down into the head to the primarily C6 distribution.  He very plays ice hockey and does golf.  He states that roughly 6 weeks ago he was playing hockey and had acute onset pain rating to the superior lateral shoulder on the right side which is only relieved with holding his arm up above his head.  He presented to the emergency department where he was counseled that he thought it was something to do around the parascapular region and they provided a injection which did not really help.  He subsequently presented to the urgent care clinic who referred him to Dr. Cabral.  He had an MRI which showed degenerative disc disease 3-4, C5-6 and C6/7 with foraminal stenosis right greater than left.  There is also cord flattening at these levels.  He subsequently had translaminar C7-T1 injection with  which has provided some relief.  He scheduled this appointment today to discuss findings of his MRI as well as potential treatment or activity restrictions which would be necessary.    Denies any loss of manual dexterity or balance.  No bowel or bladder symptoms.    On examination today he is alert and oriented no acute distress.  He walks without antalgic gait.  No evidence of dysmetria.  He has 30 degrees of cervical flexion extension 50 degrees of right and left lateral rotation.  He does have a positive Spurling's which recreates pain radiating to the posterior lateral aspect of the shoulder and tingling in the C6 distribution.  Resisted strength testing reveals 5/5 strength with shoulder elevation, shoulder abduction.  I do think there is a slight decrease in strength with right elbow flexion and elbow extension.  He has symmetric 5/5 strength with wrist flexion extension finger abduction composite  bilaterally.      Sensation is subjectively decreased in the  right C4 distribution otherwise intact C5-T1 bilaterally.  He has 2+ bicep tricep brachioradialis reflexes bilaterally.  He has a positive Martha's right greater than left.  He has 2+ patella tendon Achilles tendon bilaterally.  Downgoing Babinski bilaterally and 1 beat of clonus bilaterally.    AP lateral flexion-extension views of the cervical spine were obtained which did not straight no evidence of spondylolisthesis or dynamic instability in the cervical spine.  There is prominent anterior osteophytes at the C6-7 disc space as well as posterior disc osteophyte complex.    MRI of the cervical spine was reviewed with the patient notable for disc herniation at 3-4 with central stenosis as well as moderate foraminal stenosis.  At C5-6 there is disc herniation/osteophyte complex which results in mild central stenosis moderate foraminal stenosis bilaterally.  At C6-7 there is large disc osteophyte complex resulting in moderate central and moderate to severe foraminal stenosis bilaterally.  There is no evidence of cord signal change        Impression and plan:  52-year-old male with at least 6 weeks of progressive cervical radiculopathy without myelopathic symptoms.  He does have positive Martha's right greater than left and some mild weakness with right bicep and tricep.  He has had relief after translaminar cervical epidural steroid injection.  Counseled patient on risk of further injury with progressive neurologic dysfunction given his imaging and symptoms.  Did  that there is risk of spinal cord injury with an acute trauma given presence of cervical stenosis.  At the same time I think it is reasonable to continue with activities for wellness and recreation.  Counseled that continued nonoperative measures are reasonable I do not see any urgent indication for surgery at this time.  If symptoms do progress he develop progressive pain weakness dexterity or balance problems in my mind that would be an  indication for surgical decompression.  Given the degree of spondylosis at C6-7 I would recommend anterior cervical discectomy and fusion at that level.  Does not have the same degree of spondylosis at C5-6 and I think this could be approached by a anterior cervical disc replacement preserve motion at that level potentially decreasing the risk of adjacent level disease.  Discussed that we could either schedule a follow-up in 3 to 4 months to follow-up where he could contact me as needed.  He would prefer to follow-up on as-needed basis which I think is reasonable.  He can contact by [facility] or telephone should become warranted.  45 minutes was spent on this encounter today including chart review history physical examination and patient counseling.    Tito Swenson MD  Orthopedic Spine Surgeon  , Department of Orthopedic Surgery

## 2022-09-27 ENCOUNTER — THERAPY VISIT (OUTPATIENT)
Dept: PHYSICAL THERAPY | Facility: CLINIC | Age: 52
End: 2022-09-27
Payer: COMMERCIAL

## 2022-09-27 DIAGNOSIS — M54.12 CERVICAL RADICULOPATHY: Primary | ICD-10-CM

## 2022-09-27 PROCEDURE — 97140 MANUAL THERAPY 1/> REGIONS: CPT | Mod: GP | Performed by: PHYSICAL THERAPIST

## 2022-09-27 PROCEDURE — 97110 THERAPEUTIC EXERCISES: CPT | Mod: GP | Performed by: PHYSICAL THERAPIST

## 2022-10-15 ENCOUNTER — HEALTH MAINTENANCE LETTER (OUTPATIENT)
Age: 52
End: 2022-10-15

## 2022-12-03 ENCOUNTER — HEALTH MAINTENANCE LETTER (OUTPATIENT)
Age: 52
End: 2022-12-03

## 2023-01-23 NOTE — PROGRESS NOTES
Assessment/Plan:    DISCHARGE REPORT    Progress reporting period is from 8/12/22 to 9/27/22.       SUBJECTIVE   Subjective: Pt rerpots overall pain is improved and rotation is feeling better andsleeping is getting a little bit better.     Current Pain level: 5/10.     Changes in function:  unknown  Adverse reaction to treatment or activity: None    OBJECTIVE  Changes noted in objective findings:    Objective: improved cervical AROM into rotation bilaterally. tolerated traction muhc better today     ASSESSMENT/PLAN  Updated problem list and treatment plan: Diagnosis 1:  Neck pain  STG/LTGs have been met or progress has been made towards goals:  Yes (See Goal flow sheet completed today.)  Assessment of Progress: The patient's condition has potential to improve.  The patient has not returned to therapy. Current status is unknown.  Self Management Plans:  Patient has been instructed in a home treatment program.  Patient  has been instructed in self management of symptoms.  Teddy continues to require the following intervention to meet STG and LTG's:      Recommendations:  This patient is considered to be discharged from therapy and continue their home treatment program.    Please refer to the daily flowsheet for treatment today, total treatment time and time spent performing 1:1 timed codes.

## 2023-01-24 ENCOUNTER — MYC MEDICAL ADVICE (OUTPATIENT)
Dept: FAMILY MEDICINE | Facility: CLINIC | Age: 53
End: 2023-01-24
Payer: COMMERCIAL

## 2023-01-24 DIAGNOSIS — H93.13 TINNITUS, BILATERAL: Primary | ICD-10-CM

## 2023-02-27 ENCOUNTER — TRANSFERRED RECORDS (OUTPATIENT)
Dept: MULTI SPECIALTY CLINIC | Facility: CLINIC | Age: 53
End: 2023-02-27

## 2023-02-27 LAB — RETINOPATHY: NORMAL

## 2023-03-03 DIAGNOSIS — I10 ESSENTIAL HYPERTENSION WITH GOAL BLOOD PRESSURE LESS THAN 140/90: ICD-10-CM

## 2023-03-03 DIAGNOSIS — E78.5 DYSLIPIDEMIA: ICD-10-CM

## 2023-03-03 RX ORDER — LOSARTAN POTASSIUM AND HYDROCHLOROTHIAZIDE 25; 100 MG/1; MG/1
TABLET ORAL
Qty: 90 TABLET | Refills: 0 | Status: SHIPPED | OUTPATIENT
Start: 2023-03-03 | End: 2023-07-10

## 2023-03-03 RX ORDER — ATORVASTATIN CALCIUM 20 MG/1
TABLET, FILM COATED ORAL
Qty: 90 TABLET | Refills: 0 | Status: SHIPPED | OUTPATIENT
Start: 2023-03-03 | End: 2023-07-10

## 2023-03-03 RX ORDER — AMLODIPINE BESYLATE 5 MG/1
TABLET ORAL
Qty: 90 TABLET | Refills: 0 | Status: SHIPPED | OUTPATIENT
Start: 2023-03-03 | End: 2023-07-14

## 2023-03-26 ENCOUNTER — HEALTH MAINTENANCE LETTER (OUTPATIENT)
Age: 53
End: 2023-03-26

## 2023-04-11 NOTE — PROGRESS NOTES
History of Present Illness - Teddy Shah is a very pleasant 52 year old male here to see me for the first time for tinnitus.    He tells me that he has noticed the tinnitus for at least a year, possibly more.  He describes it being like the hum after going to a rock concert.  Otherwise, there has been no pain, no drainage, no dizziness.    Otherwise no history of chronic ear disease.  No previous ear surgery.  No history of chemo or radiation therapy to the head and neck, and no major head trauma.  He denies a history of  service, no frequent firearm use.  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  He also rides street and dirt bikes.      Past Medical History -   Patient Active Problem List   Diagnosis     Hyperlipidemia LDL goal <130     Hx of Epi-LASIK 2008     Essential hypertension with goal blood pressure less than 140/90     Seasonal allergic rhinitis, unspecified chronicity, unspecified trigger     Diabetes mellitus, type 2 (H)     Cervical radiculopathy     Cervical stenosis of spine       Current Medications -   Current Outpatient Medications:      ALLEGRA ALLERGY 180 MG tablet, TAKE 1 TABLET DAILY AS NEEDED FOR ALLERGIES, Disp: 90 tablet, Rfl: 1     amLODIPine (NORVASC) 5 MG tablet, TAKE 1 TABLET DAILY, Disp: 90 tablet, Rfl: 0     atorvastatin (LIPITOR) 20 MG tablet, TAKE 1 TABLET DAILY (IN 90 DAYS WILL SWITCH FROM SIMVASTATIN TO ATORVASTATIN), Disp: 90 tablet, Rfl: 0     gabapentin (NEURONTIN) 100 MG capsule, Take 1 capsule (100 mg) by mouth 3 times daily (Patient not taking: No sig reported), Disp: 90 capsule, Rfl: 1     hydrALAZINE (APRESOLINE) 25 MG tablet, TAKE 1 TABLET TWICE A DAY, Disp: 180 tablet, Rfl: 3     losartan-hydrochlorothiazide (HYZAAR) 100-25 MG tablet, TAKE 1 TABLET DAILY IN THE MORNING (THIS IS A COMBINATION OF COZAAR AND HYDROCHLOROTHIAZIDE FOR BLOOD PRESSURE), Disp: 90 tablet, Rfl: 0     methocarbamol (ROBAXIN) 500 MG tablet, Take 2 tablets (1,000 mg) by mouth 4  times daily (Patient not taking: No sig reported), Disp: 240 tablet, Rfl: 1     oxyCODONE (ROXICODONE) 5 MG tablet, Take 1 tablet (5 mg) by mouth every 6 hours as needed for severe pain (Patient not taking: No sig reported), Disp: 10 tablet, Rfl: 0    Allergies - No Known Allergies    Social History -   Social History     Socioeconomic History     Marital status:      Number of children: 2   Occupational History     Occupation: [employer]    Tobacco Use     Smoking status: Never     Smokeless tobacco: Never     Tobacco comments:     Lives in smoke free household   Substance and Sexual Activity     Alcohol use: Yes     Comment: occasional     Drug use: No     Sexual activity: Yes     Partners: Female   Other Topics Concern     Parent/sibling w/ CABG, MI or angioplasty before 65F 55M? Yes     Comment: Father       Family History -   Family History   Problem Relation Age of Onset     Heart Disease Father         mi at 41yo.     Hypertension Father      Coronary Artery Disease Father      Emphysema Mother      Hypertension Sister      Cancer Other         colon cancer - paternal side 3 cousins and uncle     Diabetes No family hx of      Cerebrovascular Disease No family hx of      Thyroid Disease No family hx of      Glaucoma No family hx of      Macular Degeneration No family hx of        Review of Systems - As per HPI and PMHx, otherwise 10+ system review of the head and neck, and general constitution is negative.    Physical Exam  /77   Pulse 58   Resp 16   Wt 96.6 kg (213 lb)   SpO2 95%   BMI 25.93 kg/m      General - The patient is well nourished and well developed, and appears to have good nutritional status.  Alert and oriented to person and place, answers questions and cooperates with examination appropriately.   Head and Face - Normocephalic and atraumatic, with no gross asymmetry noted of the contour of the facial features.  The facial nerve is intact, with strong symmetric  movements.  Voice and Breathing - The patient was breathing comfortably without the use of accessory muscles. There was no wheezing, stridor, or stertor.  The patients voice was clear and strong, and had appropriate pitch and quality.  Ears - The tympanic membranes are normal in appearance, bony landmarks are intact.  No retraction, perforation, or masses.  No fluid or purulence was seen in the external canal or the middle ear. No evidence of infection of the middle ear or external canal, cerumen was normal in appearance.  Eyes - Extraocular movements intact, and the pupils were reactive to light.  Sclera were not icteric or injected, conjunctiva were pink and moist.    Audiologic Studies - An audiogram and tympanogram were performed today as part of the evaluation and personally reviewed. The tympanogram shows a normal Type A curve, with normal canal volume and middle ear pressure.  There is no sign of eustachian tube dysfunction or middle ear effusion.  The audiogram showed a gently down sloping sensorineural hearing loss, with a classic noise notch at 4000Hz.  No conductive hearing loss, normal Word Recognition      A/P - Teddy Shah is a 52 year old male  (H93.13) Tinnitus, bilateral    We spent the remainder of today's visit discussing the current leading theory on tinnitus, in that it originates from the central nervous system itself, similar to Phantom Limb Syndrome.  Also discussed were steps that can be taken to mask the noise, such as a low volume de-tuned radio, a fan in the background, and hearing aids.  Correlation with stress, anxiety, depression, and high blood pressure were also discussed.  The patient was also cautioned on the numerous expensive non-pharmaceutical options that are advertised, and have no proven benefit.    The patient will follow up as necessary for worsening symptoms, changes in symptoms, or signs of infection.  I have also recommended yearly audiograms, and consideration of a  hearing aid evaluation.

## 2023-04-17 DIAGNOSIS — E11.9 TYPE 2 DIABETES MELLITUS WITHOUT COMPLICATION, UNSPECIFIED WHETHER LONG TERM INSULIN USE (H): Primary | Chronic | ICD-10-CM

## 2023-04-17 NOTE — PROGRESS NOTES
Video visit after lab please for med check. Patient needs to self-monitor blood pressure . Hasmukh Vick MD

## 2023-04-18 ENCOUNTER — LAB (OUTPATIENT)
Dept: LAB | Facility: CLINIC | Age: 53
End: 2023-04-18
Payer: COMMERCIAL

## 2023-04-18 DIAGNOSIS — E11.9 TYPE 2 DIABETES MELLITUS WITHOUT COMPLICATION, UNSPECIFIED WHETHER LONG TERM INSULIN USE (H): Chronic | ICD-10-CM

## 2023-04-18 LAB
ALBUMIN SERPL BCG-MCNC: 4.5 G/DL (ref 3.5–5.2)
ANION GAP SERPL CALCULATED.3IONS-SCNC: 13 MMOL/L (ref 7–15)
BUN SERPL-MCNC: 14.8 MG/DL (ref 6–20)
CALCIUM SERPL-MCNC: 9.5 MG/DL (ref 8.6–10)
CHLORIDE SERPL-SCNC: 101 MMOL/L (ref 98–107)
CREAT SERPL-MCNC: 1.13 MG/DL (ref 0.67–1.17)
DEPRECATED HCO3 PLAS-SCNC: 24 MMOL/L (ref 22–29)
GFR SERPL CREATININE-BSD FRML MDRD: 78 ML/MIN/1.73M2
GLUCOSE SERPL-MCNC: 156 MG/DL (ref 70–99)
HBA1C MFR BLD: 7.9 % (ref 0–5.6)
PHOSPHATE SERPL-MCNC: 2.6 MG/DL (ref 2.5–4.5)
POTASSIUM SERPL-SCNC: 4.2 MMOL/L (ref 3.4–5.3)
SODIUM SERPL-SCNC: 138 MMOL/L (ref 136–145)

## 2023-04-18 PROCEDURE — 80069 RENAL FUNCTION PANEL: CPT

## 2023-04-18 PROCEDURE — 36415 COLL VENOUS BLD VENIPUNCTURE: CPT

## 2023-04-18 PROCEDURE — 83036 HEMOGLOBIN GLYCOSYLATED A1C: CPT

## 2023-04-20 ENCOUNTER — OFFICE VISIT (OUTPATIENT)
Dept: OTOLARYNGOLOGY | Facility: CLINIC | Age: 53
End: 2023-04-20
Payer: COMMERCIAL

## 2023-04-20 ENCOUNTER — TELEPHONE (OUTPATIENT)
Dept: FAMILY MEDICINE | Facility: CLINIC | Age: 53
End: 2023-04-20

## 2023-04-20 ENCOUNTER — OFFICE VISIT (OUTPATIENT)
Dept: AUDIOLOGY | Facility: CLINIC | Age: 53
End: 2023-04-20
Payer: COMMERCIAL

## 2023-04-20 ENCOUNTER — MYC MEDICAL ADVICE (OUTPATIENT)
Dept: FAMILY MEDICINE | Facility: CLINIC | Age: 53
End: 2023-04-20

## 2023-04-20 ENCOUNTER — VIRTUAL VISIT (OUTPATIENT)
Dept: FAMILY MEDICINE | Facility: CLINIC | Age: 53
End: 2023-04-20
Payer: COMMERCIAL

## 2023-04-20 VITALS
RESPIRATION RATE: 16 BRPM | SYSTOLIC BLOOD PRESSURE: 125 MMHG | BODY MASS INDEX: 25.93 KG/M2 | HEART RATE: 58 BPM | OXYGEN SATURATION: 95 % | DIASTOLIC BLOOD PRESSURE: 77 MMHG | WEIGHT: 213 LBS

## 2023-04-20 DIAGNOSIS — E11.9 TYPE 2 DIABETES MELLITUS WITHOUT COMPLICATION, WITHOUT LONG-TERM CURRENT USE OF INSULIN (H): Primary | ICD-10-CM

## 2023-04-20 DIAGNOSIS — H93.13 TINNITUS, BILATERAL: ICD-10-CM

## 2023-04-20 DIAGNOSIS — H93.13 TINNITUS OF BOTH EARS: Primary | ICD-10-CM

## 2023-04-20 PROCEDURE — 99203 OFFICE O/P NEW LOW 30 MIN: CPT | Performed by: OTOLARYNGOLOGY

## 2023-04-20 PROCEDURE — 92557 COMPREHENSIVE HEARING TEST: CPT

## 2023-04-20 PROCEDURE — 92550 TYMPANOMETRY & REFLEX THRESH: CPT

## 2023-04-20 PROCEDURE — 99213 OFFICE O/P EST LOW 20 MIN: CPT | Mod: VID | Performed by: FAMILY MEDICINE

## 2023-04-20 RX ORDER — METFORMIN HCL 500 MG
500 TABLET, EXTENDED RELEASE 24 HR ORAL
Qty: 90 TABLET | Refills: 0 | Status: SHIPPED | OUTPATIENT
Start: 2023-04-20 | End: 2023-07-14

## 2023-04-20 ASSESSMENT — PAIN SCALES - GENERAL: PAINLEVEL: NO PAIN (0)

## 2023-04-20 NOTE — PROGRESS NOTES
AUDIOLOGY REPORT:    Patient was referred to [facility] from ENT by Dr. Guerra for a hearing examination. Patient reports new onset of constant bilateral tinnitus. He denies concern for hearing loss, pain, pressure, drainage, family history of hearing loss, history of ear surgeries and dizziness. Vasyl does note a history of noise exposure through hobbies. He was not accompanied to today's appointment    Testing:    Otoscopy:   Otoscopic exam indicates ears are clear of cerumen bilaterally     Tympanograms:    RIGHT: Hypermobile     LEFT:   normal eardrum mobility    Reflexes (reported by stimulus ear): 1000 Hz  RIGHT: Ipsilateral is elevated at normal levels  RIGHT: Contralateral is elevated at normal levels  LEFT:   Ipsilateral is present at normal levels  LEFT:   Contralateral is present at normal levels    Thresholds:   Pure Tone Thresholds assessed using conventional audiometry with good  reliability from 250-8000 Hz bilaterally using insert earphones and circumaural headphones     RIGHT:  normal sloping to mild sensorineural hearing loss    LEFT:    normal sloping to mild sensorineural hearing loss    Speech Reception Threshold:    RIGHT: 5 dB HL    LEFT:   10 dB HL  Speech Reception Thresholds are in good agreement with pure tone thresholds    Word Recognition Score:     RIGHT: 100% at 50 dB HL using NU-6 recorded word list.    LEFT:   100% at 50 dB HL using NU-6 recorded word list.    Discussed results with the patient.     Patient was returned to ENT for follow up.     Doris Patterson, CCC-A  Licensed Audiologist  [place] #[id number]    04/20/23

## 2023-04-20 NOTE — PROGRESS NOTES
Vasyl is a 52 year old who is being evaluated via a billable video visit.      How would you like to obtain your AVS? MyChart  If the video visit is dropped, the invitation should be resent by: Send to e-mail at: [email or web address]  Will anyone else be joining your video visit? No          ASSESSMENT / PLAN:  (E11.9) Type 2 diabetes mellitus without complication, without long-term current use of insulin (H)  (primary encounter diagnosis)  Comment: needs help  Plan: metFORMIN (GLUCOPHAGE XR) 500 MG 24 hr tablet,         Hemoglobin A1c        Add meformin. Reveiwed risks and side effects of medication  Diet/exercise (add weight lifting) and weight loss. Expected course and warning signs reviewed. Chest pain or shortness of breath to er.Recheck in 3 months  Non-fasting lab. cpe in late summer. Return to clinic sooner if new issues. Call/email with questions/concerns       Subjective   Vasyl is a 52 year old, presenting for the following health issues:  Diabetes  Follow-up htn, high cholesterol,cervical radiculopathy and dm.  Metformin 6 years ago. Can't remember is side effects.   Was out of town and not playing hockey past few months.   Weight 218lbs. Outside blood pressure readings. No chest pain or shortness of breath.   Restarted playing hockey. Good exercise tolerance.   Will by more active. Eye exam march ok.   No feet changes.       4/20/2023     9:09 AM   Additional Questions   Roomed by Vani     History of Present Illness       Diabetes:   He presents for follow up of diabetes.  He is not checking blood glucose. He is concerned about blood sugar frequently over 200 and other.  He is not experiencing numbness or burning in feet, excessive thirst, blurry vision, weight changes or redness, sores or blisters on feet. The patient has had a diabetic eye exam in the last 12 months. Eye exam performed on 02/27/2023. Location of last eye exam [facility].        He eats 0-1 servings of fruits and  vegetables daily.He consumes 0 sweetened beverage(s) daily.He exercises with enough effort to increase his heart rate 10 to 19 minutes per day.  He exercises with enough effort to increase his heart rate 4 days per week.   He is taking medications regularly.         Review of Systems         Objective           Vitals:  No vitals were obtained today due to virtual visit.    Physical Exam   GENERAL: Healthy, alert and no distress  EYES: Eyes grossly normal to inspection.  No discharge or erythema, or obvious scleral/conjunctival abnormalities.  RESP: No audible wheeze, cough, or visible cyanosis.  No visible retractions or increased work of breathing.    SKIN: Visible skin clear. No significant rash, abnormal pigmentation or lesions.  NEURO: Cranial nerves grossly intact.  Mentation and speech appropriate for age.  PSYCH: Mentation appears normal, affect normal/bright, judgement and insight intact, normal speech and appearance well-groomed.      Video-Visit Details    Type of service:  Video Visit     Originating Location (pt. Location): Home    Distant Location (provider location):  On-site  Platform used for Video Visit: Autumn

## 2023-04-20 NOTE — LETTER
4/20/2023         RE: Teddy Shah  [address] [address] [id number]        Dear Colleague,    Thank you for referring your patient, Teddy Shah, to the [facility]. Please see a copy of my visit note below.    History of Present Illness - Teddy Shah is a very pleasant 52 year old male here to see me for the first time for tinnitus.    He tells me that he has noticed the tinnitus for at least a year, possibly more.  He describes it being like the hum after going to a rock concert.  Otherwise, there has been no pain, no drainage, no dizziness.    Otherwise no history of chronic ear disease.  No previous ear surgery.  No history of chemo or radiation therapy to the head and neck, and no major head trauma.  He denies a history of  service, no frequent firearm use.  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  He also rides street and dirt bikes.      Past Medical History -   Patient Active Problem List   Diagnosis     Hyperlipidemia LDL goal <130     Hx of Epi-LASIK 2008     Essential hypertension with goal blood pressure less than 140/90     Seasonal allergic rhinitis, unspecified chronicity, unspecified trigger     Diabetes mellitus, type 2 (H)     Cervical radiculopathy     Cervical stenosis of spine       Current Medications -   Current Outpatient Medications:      ALLEGRA ALLERGY 180 MG tablet, TAKE 1 TABLET DAILY AS NEEDED FOR ALLERGIES, Disp: 90 tablet, Rfl: 1     amLODIPine (NORVASC) 5 MG tablet, TAKE 1 TABLET DAILY, Disp: 90 tablet, Rfl: 0     atorvastatin (LIPITOR) 20 MG tablet, TAKE 1 TABLET DAILY (IN 90 DAYS WILL SWITCH FROM SIMVASTATIN TO ATORVASTATIN), Disp: 90 tablet, Rfl: 0     gabapentin (NEURONTIN) 100 MG capsule, Take 1 capsule (100 mg) by mouth 3 times daily (Patient not taking: No sig reported), Disp: 90 capsule, Rfl: 1     hydrALAZINE (APRESOLINE) 25 MG tablet, TAKE 1 TABLET TWICE A DAY, Disp: 180 tablet, Rfl: 3      losartan-hydrochlorothiazide (HYZAAR) 100-25 MG tablet, TAKE 1 TABLET DAILY IN THE MORNING (THIS IS A COMBINATION OF COZAAR AND HYDROCHLOROTHIAZIDE FOR BLOOD PRESSURE), Disp: 90 tablet, Rfl: 0     methocarbamol (ROBAXIN) 500 MG tablet, Take 2 tablets (1,000 mg) by mouth 4 times daily (Patient not taking: No sig reported), Disp: 240 tablet, Rfl: 1     oxyCODONE (ROXICODONE) 5 MG tablet, Take 1 tablet (5 mg) by mouth every 6 hours as needed for severe pain (Patient not taking: No sig reported), Disp: 10 tablet, Rfl: 0    Allergies - No Known Allergies    Social History -   Social History     Socioeconomic History     Marital status:      Number of children: 2   Occupational History     Occupation: [employer]    Tobacco Use     Smoking status: Never     Smokeless tobacco: Never     Tobacco comments:     Lives in smoke free household   Substance and Sexual Activity     Alcohol use: Yes     Comment: occasional     Drug use: No     Sexual activity: Yes     Partners: Female   Other Topics Concern     Parent/sibling w/ CABG, MI or angioplasty before 65F 55M? Yes     Comment: Father       Family History -   Family History   Problem Relation Age of Onset     Heart Disease Father         mi at 39yo.     Hypertension Father      Coronary Artery Disease Father      Emphysema Mother      Hypertension Sister      Cancer Other         colon cancer - paternal side 3 cousins and uncle     Diabetes No family hx of      Cerebrovascular Disease No family hx of      Thyroid Disease No family hx of      Glaucoma No family hx of      Macular Degeneration No family hx of        Review of Systems - As per HPI and PMHx, otherwise 10+ system review of the head and neck, and general constitution is negative.    Physical Exam  /77   Pulse 58   Resp 16   Wt 96.6 kg (213 lb)   SpO2 95%   BMI 25.93 kg/m      General - The patient is well nourished and well developed, and appears to have good nutritional status.  Alert  and oriented to person and place, answers questions and cooperates with examination appropriately.   Head and Face - Normocephalic and atraumatic, with no gross asymmetry noted of the contour of the facial features.  The facial nerve is intact, with strong symmetric movements.  Voice and Breathing - The patient was breathing comfortably without the use of accessory muscles. There was no wheezing, stridor, or stertor.  The patients voice was clear and strong, and had appropriate pitch and quality.  Ears - The tympanic membranes are normal in appearance, bony landmarks are intact.  No retraction, perforation, or masses.  No fluid or purulence was seen in the external canal or the middle ear. No evidence of infection of the middle ear or external canal, cerumen was normal in appearance.  Eyes - Extraocular movements intact, and the pupils were reactive to light.  Sclera were not icteric or injected, conjunctiva were pink and moist.    Audiologic Studies - An audiogram and tympanogram were performed today as part of the evaluation and personally reviewed. The tympanogram shows a normal Type A curve, with normal canal volume and middle ear pressure.  There is no sign of eustachian tube dysfunction or middle ear effusion.  The audiogram showed a gently down sloping sensorineural hearing loss, with a classic noise notch at 4000Hz.  No conductive hearing loss, normal Word Recognition      A/P - Teddy Shah is a 52 year old male  (H93.13) Tinnitus, bilateral    We spent the remainder of today's visit discussing the current leading theory on tinnitus, in that it originates from the central nervous system itself, similar to Phantom Limb Syndrome.  Also discussed were steps that can be taken to mask the noise, such as a low volume de-tuned radio, a fan in the background, and hearing aids.  Correlation with stress, anxiety, depression, and high blood pressure were also discussed.  The patient was also cautioned on the  numerous expensive non-pharmaceutical options that are advertised, and have no proven benefit.    The patient will follow up as necessary for worsening symptoms, changes in symptoms, or signs of infection.  I have also recommended yearly audiograms, and consideration of a hearing aid evaluation.        Again, thank you for allowing me to participate in the care of your patient.        Sincerely,        Dipak Guerra MD

## 2023-04-20 NOTE — TELEPHONE ENCOUNTER
Patient needs non-fasting labs in 2-3months and cpe in late summer/early fall.     Sent [employer] message to patient.Cynthia Valdes MA/TC

## 2023-06-23 DIAGNOSIS — E11.9 TYPE 2 DIABETES MELLITUS WITHOUT COMPLICATION, WITHOUT LONG-TERM CURRENT USE OF INSULIN (H): ICD-10-CM

## 2023-06-23 RX ORDER — METFORMIN HCL 500 MG
500 TABLET, EXTENDED RELEASE 24 HR ORAL
Qty: 90 TABLET | Refills: 0 | OUTPATIENT
Start: 2023-06-23

## 2023-07-06 DIAGNOSIS — E11.9 TYPE 2 DIABETES MELLITUS WITHOUT COMPLICATION, WITHOUT LONG-TERM CURRENT USE OF INSULIN (H): ICD-10-CM

## 2023-07-06 NOTE — TELEPHONE ENCOUNTER
Reason for Call:  Medication or medication refill:    Do you use a [facility]?  Name of the pharmacy and phone number for the current request[facility]    Name of the medication requested: Metformin    Other request: anytime    Can we leave a detailed message on this number? YES    Phone number patient can be reached at: Cell number on file:    Telephone Information:   Mobile [phone]       Best Time: anytime    Call taken on 7/6/2023 at 2:31 PM by Martina Starr

## 2023-07-07 RX ORDER — METFORMIN HCL 500 MG
500 TABLET, EXTENDED RELEASE 24 HR ORAL
Qty: 90 TABLET | Refills: 0 | OUTPATIENT
Start: 2023-07-07

## 2023-07-07 ASSESSMENT — ENCOUNTER SYMPTOMS
DIZZINESS: 0
SHORTNESS OF BREATH: 0
DYSURIA: 0
JOINT SWELLING: 0
FREQUENCY: 0
HEMATURIA: 0
PALPITATIONS: 0
WEAKNESS: 0
MYALGIAS: 0
CONSTIPATION: 0
HEARTBURN: 0
SORE THROAT: 0
NAUSEA: 0
PARESTHESIAS: 0
COUGH: 0
HEMATOCHEZIA: 0
CHILLS: 0
NERVOUS/ANXIOUS: 0
FEVER: 0
ARTHRALGIAS: 0
EYE PAIN: 0
DIARRHEA: 0
HEADACHES: 0
ABDOMINAL PAIN: 0

## 2023-07-10 ENCOUNTER — LAB (OUTPATIENT)
Dept: LAB | Facility: CLINIC | Age: 53
End: 2023-07-10
Payer: COMMERCIAL

## 2023-07-10 ENCOUNTER — DOCUMENTATION ONLY (OUTPATIENT)
Dept: LAB | Facility: CLINIC | Age: 53
End: 2023-07-10

## 2023-07-10 DIAGNOSIS — I10 ESSENTIAL HYPERTENSION WITH GOAL BLOOD PRESSURE LESS THAN 140/90: Chronic | ICD-10-CM

## 2023-07-10 DIAGNOSIS — E78.5 HYPERLIPIDEMIA LDL GOAL <130: Chronic | ICD-10-CM

## 2023-07-10 DIAGNOSIS — E11.9 TYPE 2 DIABETES MELLITUS WITHOUT COMPLICATION, WITHOUT LONG-TERM CURRENT USE OF INSULIN (H): ICD-10-CM

## 2023-07-10 DIAGNOSIS — I10 ESSENTIAL HYPERTENSION WITH GOAL BLOOD PRESSURE LESS THAN 140/90: Primary | Chronic | ICD-10-CM

## 2023-07-10 LAB
ALBUMIN SERPL BCG-MCNC: 4.6 G/DL (ref 3.5–5.2)
ANION GAP SERPL CALCULATED.3IONS-SCNC: 15 MMOL/L (ref 7–15)
BUN SERPL-MCNC: 16.7 MG/DL (ref 6–20)
CALCIUM SERPL-MCNC: 9.5 MG/DL (ref 8.6–10)
CHLORIDE SERPL-SCNC: 102 MMOL/L (ref 98–107)
CHOLEST SERPL-MCNC: 131 MG/DL
CREAT SERPL-MCNC: 1.15 MG/DL (ref 0.67–1.17)
DEPRECATED HCO3 PLAS-SCNC: 24 MMOL/L (ref 22–29)
GFR SERPL CREATININE-BSD FRML MDRD: 76 ML/MIN/1.73M2
GLUCOSE SERPL-MCNC: 127 MG/DL (ref 70–99)
HBA1C MFR BLD: 6.9 % (ref 0–5.6)
HDLC SERPL-MCNC: 31 MG/DL
HOLD SPECIMEN: NORMAL
LDLC SERPL CALC-MCNC: 57 MG/DL
NONHDLC SERPL-MCNC: 100 MG/DL
PHOSPHATE SERPL-MCNC: 2.9 MG/DL (ref 2.5–4.5)
POTASSIUM SERPL-SCNC: 3.8 MMOL/L (ref 3.4–5.3)
SODIUM SERPL-SCNC: 141 MMOL/L (ref 136–145)
TRIGL SERPL-MCNC: 214 MG/DL

## 2023-07-10 PROCEDURE — 80061 LIPID PANEL: CPT

## 2023-07-10 PROCEDURE — 80069 RENAL FUNCTION PANEL: CPT

## 2023-07-10 PROCEDURE — 36415 COLL VENOUS BLD VENIPUNCTURE: CPT

## 2023-07-10 PROCEDURE — 83036 HEMOGLOBIN GLYCOSYLATED A1C: CPT

## 2023-07-10 NOTE — PROGRESS NOTES
Patient was seen in lab for fasting PVL this morning. Only A1C was ordered, please place add on orders as needed.  Thank you!   Christi Ji

## 2023-07-14 ENCOUNTER — ANCILLARY PROCEDURE (OUTPATIENT)
Dept: GENERAL RADIOLOGY | Facility: CLINIC | Age: 53
End: 2023-07-14
Attending: FAMILY MEDICINE
Payer: COMMERCIAL

## 2023-07-14 ENCOUNTER — OFFICE VISIT (OUTPATIENT)
Dept: FAMILY MEDICINE | Facility: CLINIC | Age: 53
End: 2023-07-14
Payer: COMMERCIAL

## 2023-07-14 VITALS
SYSTOLIC BLOOD PRESSURE: 125 MMHG | WEIGHT: 219 LBS | HEIGHT: 76 IN | OXYGEN SATURATION: 98 % | BODY MASS INDEX: 26.67 KG/M2 | TEMPERATURE: 98.3 F | DIASTOLIC BLOOD PRESSURE: 65 MMHG | HEART RATE: 54 BPM

## 2023-07-14 DIAGNOSIS — M25.561 CHRONIC PAIN OF RIGHT KNEE: ICD-10-CM

## 2023-07-14 DIAGNOSIS — E78.5 DYSLIPIDEMIA: ICD-10-CM

## 2023-07-14 DIAGNOSIS — G89.29 CHRONIC PAIN OF RIGHT KNEE: ICD-10-CM

## 2023-07-14 DIAGNOSIS — I10 ESSENTIAL HYPERTENSION WITH GOAL BLOOD PRESSURE LESS THAN 140/90: ICD-10-CM

## 2023-07-14 DIAGNOSIS — Z12.5 SCREENING PSA (PROSTATE SPECIFIC ANTIGEN): ICD-10-CM

## 2023-07-14 DIAGNOSIS — E11.9 TYPE 2 DIABETES MELLITUS WITHOUT COMPLICATION, WITHOUT LONG-TERM CURRENT USE OF INSULIN (H): ICD-10-CM

## 2023-07-14 DIAGNOSIS — Z00.00 ROUTINE HISTORY AND PHYSICAL EXAMINATION OF ADULT: Primary | ICD-10-CM

## 2023-07-14 PROCEDURE — 99396 PREV VISIT EST AGE 40-64: CPT | Performed by: FAMILY MEDICINE

## 2023-07-14 PROCEDURE — 73562 X-RAY EXAM OF KNEE 3: CPT | Mod: TC | Performed by: RADIOLOGY

## 2023-07-14 PROCEDURE — 99213 OFFICE O/P EST LOW 20 MIN: CPT | Mod: 25 | Performed by: FAMILY MEDICINE

## 2023-07-14 RX ORDER — CALCIUM CARBONATE/VITAMIN D3 500-10/5ML
LIQUID (ML) ORAL
COMMUNITY

## 2023-07-14 RX ORDER — AMLODIPINE BESYLATE 5 MG/1
5 TABLET ORAL DAILY
Qty: 90 TABLET | Refills: 3 | Status: SHIPPED | OUTPATIENT
Start: 2023-07-14 | End: 2024-07-22

## 2023-07-14 RX ORDER — LOSARTAN POTASSIUM AND HYDROCHLOROTHIAZIDE 25; 100 MG/1; MG/1
TABLET ORAL
Qty: 90 TABLET | Refills: 1 | Status: SHIPPED | OUTPATIENT
Start: 2023-07-14 | End: 2024-04-19

## 2023-07-14 RX ORDER — METFORMIN HCL 500 MG
500 TABLET, EXTENDED RELEASE 24 HR ORAL
Qty: 90 TABLET | Refills: 1 | Status: SHIPPED | OUTPATIENT
Start: 2023-07-14 | End: 2024-02-26

## 2023-07-14 RX ORDER — ATORVASTATIN CALCIUM 20 MG/1
TABLET, FILM COATED ORAL
Qty: 90 TABLET | Refills: 3 | Status: SHIPPED | OUTPATIENT
Start: 2023-07-14 | End: 2024-07-22

## 2023-07-14 ASSESSMENT — ENCOUNTER SYMPTOMS
HEADACHES: 0
MYALGIAS: 0
ABDOMINAL PAIN: 0
WEAKNESS: 0
ARTHRALGIAS: 0
NERVOUS/ANXIOUS: 0
FREQUENCY: 0
EYE PAIN: 0
DYSURIA: 0
CONSTIPATION: 0
SORE THROAT: 0
HEARTBURN: 0
HEMATOCHEZIA: 0
SHORTNESS OF BREATH: 0
PALPITATIONS: 0
NAUSEA: 0
HEMATURIA: 0
COUGH: 0
DIARRHEA: 0
FEVER: 0
CHILLS: 0
DIZZINESS: 0
PARESTHESIAS: 0
JOINT SWELLING: 0

## 2023-07-14 ASSESSMENT — PAIN SCALES - GENERAL: PAINLEVEL: MILD PAIN (2)

## 2023-07-14 NOTE — PROGRESS NOTES
SUBJECTIVE:   CC: Vasyl is an 53 year old who presents for preventative health visit.   Follow-up htn, high cholesterol,cervical radiculopathy and dm.  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 Exercise regularly.   No chest pain or shortness of breath. No feet changes. Eye exam in spring.  No nausea, vomiting or diarrhea or black/bloodys stools. No urine changes or hematuria.   Emotionally doing ok.   2 daughters and 1 son. Daughter moved in with  and 4 kids.  Wife in [place]. No mole changers or rashes.   Sleep overall ok. Occasionally beer.  No testicle pain/masses/hernia. No std concerns.   right knee pain/swelling. On/off for 6months.       7/14/2023     7:22 AM   Additional Questions   Roomed by MELODIE Calvert CMA     Forms 7/14/2023   Any forms needing to be completed Yes     Healthy Habits:     Getting at least 3 servings of Calcium per day:  NO    Bi-annual eye exam:  Yes    Dental care twice a year:  Yes    Sleep apnea or symptoms of sleep apnea:  None    Diet:  Regular (no restrictions)    Frequency of exercise:  2-3 days/week    Duration of exercise:  30-45 minutes    Taking medications regularly:  Yes    Medication side effects:  Other    Additional concerns today:  Yes              Social History     Tobacco Use     Smoking status: Never     Smokeless tobacco: Never     Tobacco comments:     Lives in smoke free household   Substance Use Topics     Alcohol use: Yes     Comment: occasional             7/7/2023     9:44 AM   Alcohol Use   Prescreen: >3 drinks/day or >7 drinks/week? No       Last PSA:   PSA   Date Value Ref Range Status   03/01/2021 1.71 0 - 4 ug/L Final     Comment:     Assay Method:  Chemiluminescence using Siemens Vista analyzer     Prostate Specific Antigen Screen   Date Value Ref Range Status   08/14/2022 1.04 0.00 - 4.00 ug/L Final       Reviewed orders with patient. Reviewed health maintenance and updated orders accordingly - Yes  Lab work is in process    Reviewed and updated as needed this visit by clinical  "staff   Tobacco  Allergies  Meds              Reviewed and updated as needed this visit by Provider                     Review of Systems   Constitutional: Negative for chills and fever.   HENT: Negative for congestion, ear pain, hearing loss and sore throat.    Eyes: Negative for pain and visual disturbance.   Respiratory: Negative for cough and shortness of breath.    Cardiovascular: Negative for chest pain, palpitations and peripheral edema.   Gastrointestinal: Negative for abdominal pain, constipation, diarrhea, heartburn, hematochezia and nausea.   Genitourinary: Negative for dysuria, frequency, genital sores, hematuria, impotence, penile discharge and urgency.   Musculoskeletal: Negative for arthralgias, joint swelling and myalgias.   Skin: Negative for rash.   Neurological: Negative for dizziness, weakness, headaches and paresthesias.   Psychiatric/Behavioral: Negative for mood changes. The patient is not nervous/anxious.          OBJECTIVE:   /65   Pulse 54   Temp 98.3  F (36.8  C) (Oral)   Ht 1.93 m (6' 4\")   Wt 99.3 kg (219 lb)   SpO2 98%   BMI 26.66 kg/m      Physical Exam  GENERAL: healthy, alert and no distress  EYES: Eyes grossly normal to inspection, PERRL and conjunctivae and sclerae normal  HENT: ear canals and TM's normal, nose and mouth without ulcers or lesions  NECK: no adenopathy, no asymmetry, masses, or scars and thyroid normal to palpation  RESP: lungs clear to auscultation - no rales, rhonchi or wheezes  BREAST: normal without masses, tenderness or nipple discharge and no palpable axillary masses or adenopathy  CV: regular rate and rhythm, normal S1 S2, no S3 or S4, no murmur, click or rub, no peripheral edema and peripheral pulses strong  ABDOMEN: soft, nontender, no hepatosplenomegaly, no masses and bowel sounds normal   (male): patient deferred /rectal exams  MS: no gross musculoskeletal defects noted, no edema  MS: some pain with hyperflexin  SKIN: no suspicious " "lesions or rashes  NEURO: Normal strength and tone, mentation intact and speech normal  PSYCH: mentation appears normal, affect normal/bright  LYMPH: no cervical, supraclavicular, axillary adenopathy    ASSESSMENT/PLAN:   ASSESSMENT / PLAN:  (Z00.00) Routine history and physical examination of adult  (primary encounter diagnosis)  Comment: generally healthy and normal exam  Plan: CBC with platelets        Reviewed self mole/testicle check handout.  vitmainD    (M25.561,  G89.29) Chronic pain of right knee  Comment: meniscal tear?  Plan: XR Knee Right 3 Views, Orthopedic          Referral         Follow-up ortho. Await rad report    (Z12.5) Screening PSA (prostate specific antigen)  Plan: Prostate Specific Antigen Screen        Not due for a month. Future lab set-up    (I10) Essential hypertension with goal blood pressure less than 140/90  Comment: stable  Plan: amLODIPine (NORVASC) 5 MG tablet,         losartan-hydrochlorothiazide (HYZAAR) 100-25 MG        tablet        Continue exercise. Chest pain or shortness of breath to er.    (E78.5) Dyslipidemia  Comment: stable  Plan: atorvastatin (LIPITOR) 20 MG tablet        Exercise.    (E11.9) Type 2 diabetes mellitus without complication, without long-term current use of insulin (H)  Comment: stable/improving  Plan: metFORMIN (GLUCOPHAGE XR) 500 MG 24 hr tablet        Recheck in 6 months  With video visit.       Patient has been advised of split billing requirements and indicates understanding: Yes      COUNSELING:   Reviewed preventive health counseling, as reflected in patient instructions       Regular exercise       Healthy diet/nutrition       Vision screening       Alcohol Use        Colorectal cancer screening       Prostate cancer screening       Osteoporosis prevention/bone health      BMI:   Estimated body mass index is 26.66 kg/m  as calculated from the following:    Height as of this encounter: 1.93 m (6' 4\").    Weight as of this encounter: 99.3 kg " (219 lb).   Weight management plan: Discussed healthy diet and exercise guidelines      He reports that he has never smoked. He has never used smokeless tobacco.            Hasmukh Vick MD  [facility]

## 2023-08-23 NOTE — PROGRESS NOTES
HISTORY OF PRESENT ILLNESS    Teddy Shah is a 53 year old male who is seen in consultation at the request of Dr. Vick for evaluation of  right knee pain that has been present approximately 8 months.    Seemed to start after kneeling to fix motorcycle    Present symptoms: not terrible pain  Pain to kneel due to direct pressure.    Walking is ok   No catching, locking or giving-way .    Plays hockey no problem.  Treatments tried to this point: uses a pad to kneel    Orthopedic PMH: history of humeral rodding.     Other PMH:  has a past medical history of Adjustment disorder with anxiety (04/12/2013), Arthritis, Closed fracture of humerus (07/08/2010), Delayed union of fracture (07/08/2010), Diabetes mellitus, type 2 (H) (5/2/2021), HTN (hypertension), and Hypercholesteremia.    He has no past medical history of Amblyopia, Bleeding disorder (H), Cancer (H), Cataract, Degenerative joint disease, Diabetic retinopathy (H), Glaucoma (increased eye pressure), Heart disease, Inflammatory arthritis, Kidney disease, Retinal detachment, Senile macular degeneration, Strabismus, Stroke (H), Surgical complications, Unspecified asthma(493.90), or Uveitis.    Surgical:  has a past surgical history that includes orthopedic surgery (01/04/2010); lasik (2008); Esophagoscopy, gastroscopy, duodenoscopy (EGD), combined (07/15/2014); and Colonoscopy with CO2 insufflation (N/A, 05/26/2016).    Family Hx:  family history includes Cancer in an other family member; Coronary Artery Disease in his father; Emphysema in his mother; Heart Disease in his father; Hypertension in his father and sister.    Social Hx:  reports that he has never smoked. He has never used smokeless tobacco. He reports current alcohol use. He reports that he does not use drugs.    REVIEW OF SYSTEMS:    CONSTITUTIONAL:  NEGATIVE for fever, chills, change in weight  INTEGUMENTARY/SKIN:  NEGATIVE for worrisome rashes, moles or lesions  EYES:  NEGATIVE for vision changes  "or irritation  ENT/MOUTH:  NEGATIVE for ear, mouth and throat problems  RESP:  NEGATIVE for significant cough or SOB  BREAST:  NEGATIVE for masses, tenderness or discharge  CV:  NEGATIVE for chest pain, palpitations or peripheral edema  GI:  NEGATIVE for nausea, abdominal pain, heartburn, or change in bowel habits  :  Negative   MUSCULOSKELETAL:  See HPI above  NEURO:  NEGATIVE for weakness, dizziness or paresthesias  ENDOCRINE:  NEGATIVE for temperature intolerance, skin/hair changes  HEME/ALLERGY/IMMUNE:  NEGATIVE for bleeding problems  PSYCHIATRIC:  NEGATIVE for changes in mood or affect    PHYSICAL EXAM:  BP (!) 147/69 (BP Location: Left arm, Patient Position: Sitting, Cuff Size: Adult Large)   Pulse 64   Ht 1.93 m (6' 4\")   Wt 97.1 kg (214 lb)   SpO2 98%   BMI 26.05 kg/m     GENERAL APPEARANCE: healthy, alert, and no distress   SKIN: no suspicious lesions or rashes  NEURO: Normal strength and tone, mentation intact, and speech normal  VASCULAR:  good pulses, and cappillary refill   LYMPH: no lymphadenopathy   PSYCH:  mentation appears normal and affect normal/bright  RESP: no increased work of breathing     KNEE EXAM:   Gait: walks with normal gait  Alignment: normal   Squat: 100 % painfree.    Patellofemoral joint: mild crepitations in the patellofemoral joint.  Effusion: none  ROM: full  Tender: medial joint line and mainly anterior  Masses: none  Ligaments:  Lachman's Stable, Anterior and posterior drawer stable, stable to varus and valgus stress.  no pain with Varus/Valgus stress testing.    McMurrays: negative  Lateral retinaculum is not tight      KNEE THREE VIEWS RIGHT  7/14/2023 8:27 AM   No acute fracture or malalignment. Mild patellofemoral  compartment degenerative changes. Minimal knee joint effusion     Impression: most likely he has a contused IBSN due to kneeling. Other possibilities include a small anterior medial meniscus tear or meniscal cyst not palpable.    Plan:  in any case, the " symptoms aren't bad enough to warrant intervention or advanced imaging.  We discussed that an arthroscopy, should there be a tear would likely not make these symptoms better, even if there is an anterior medial meniscus tear.    Return to clinic PRN, if there are more bothersome symptoms.     EFRAIN Pacheco MD  Dept. Orthopedic Surgery  [facility]

## 2023-08-24 ENCOUNTER — OFFICE VISIT (OUTPATIENT)
Dept: ORTHOPEDICS | Facility: CLINIC | Age: 53
End: 2023-08-24
Attending: FAMILY MEDICINE
Payer: COMMERCIAL

## 2023-08-24 VITALS
DIASTOLIC BLOOD PRESSURE: 69 MMHG | HEIGHT: 76 IN | OXYGEN SATURATION: 98 % | WEIGHT: 214 LBS | HEART RATE: 64 BPM | SYSTOLIC BLOOD PRESSURE: 147 MMHG | BODY MASS INDEX: 26.06 KG/M2

## 2023-08-24 DIAGNOSIS — S80.01XA CONTUSION OF RIGHT KNEE, INITIAL ENCOUNTER: Primary | ICD-10-CM

## 2023-08-24 DIAGNOSIS — M25.561 CHRONIC PAIN OF RIGHT KNEE: ICD-10-CM

## 2023-08-24 DIAGNOSIS — G89.29 CHRONIC PAIN OF RIGHT KNEE: ICD-10-CM

## 2023-08-24 PROCEDURE — 99243 OFF/OP CNSLTJ NEW/EST LOW 30: CPT | Performed by: ORTHOPAEDIC SURGERY

## 2023-08-24 ASSESSMENT — PAIN SCALES - GENERAL: PAINLEVEL: NO PAIN (1)

## 2023-08-24 NOTE — LETTER
8/24/2023         RE: Teddy Shah  [address] [address]        Dear Colleague,    Thank you for referring your patient, Teddy Shah, to the [facility]. Please see a copy of my visit note below.    HISTORY OF PRESENT ILLNESS    Teddy Shah is a 53 year old male who is seen in consultation at the request of Dr. Vick for evaluation of  right knee pain that has been present approximately 8 months.    Seemed to start after kneeling to fix motorcycle    Present symptoms: not terrible pain  Pain to kneel due to direct pressure.    Walking is ok   No catching, locking or giving-way .    Plays hockey no problem.  Treatments tried to this point: uses a pad to kneel    Orthopedic PMH: history of humeral rodding.     Other PMH:  has a past medical history of Adjustment disorder with anxiety (04/12/2013), Arthritis, Closed fracture of humerus (07/08/2010), Delayed union of fracture (07/08/2010), Diabetes mellitus, type 2 (H) (5/2/2021), HTN (hypertension), and Hypercholesteremia.    He has no past medical history of Amblyopia, Bleeding disorder (H), Cancer (H), Cataract, Degenerative joint disease, Diabetic retinopathy (H), Glaucoma (increased eye pressure), Heart disease, Inflammatory arthritis, Kidney disease, Retinal detachment, Senile macular degeneration, Strabismus, Stroke (H), Surgical complications, Unspecified asthma(493.90), or Uveitis.    Surgical:  has a past surgical history that includes orthopedic surgery (01/04/2010); lasik (2008); Esophagoscopy, gastroscopy, duodenoscopy (EGD), combined (07/15/2014); and Colonoscopy with CO2 insufflation (N/A, 05/26/2016).    Family Hx:  family history includes Cancer in an other family member; Coronary Artery Disease in his father; Emphysema in his mother; Heart Disease in his father; Hypertension in his father and sister.    Social Hx:  reports that he has never smoked. He has never used smokeless tobacco. He reports  "current alcohol use. He reports that he does not use drugs.    REVIEW OF SYSTEMS:    CONSTITUTIONAL:  NEGATIVE for fever, chills, change in weight  INTEGUMENTARY/SKIN:  NEGATIVE for worrisome rashes, moles or lesions  EYES:  NEGATIVE for vision changes or irritation  ENT/MOUTH:  NEGATIVE for ear, mouth and throat problems  RESP:  NEGATIVE for significant cough or SOB  BREAST:  NEGATIVE for masses, tenderness or discharge  CV:  NEGATIVE for chest pain, palpitations or peripheral edema  GI:  NEGATIVE for nausea, abdominal pain, heartburn, or change in bowel habits  :  Negative   MUSCULOSKELETAL:  See HPI above  NEURO:  NEGATIVE for weakness, dizziness or paresthesias  ENDOCRINE:  NEGATIVE for temperature intolerance, skin/hair changes  HEME/ALLERGY/IMMUNE:  NEGATIVE for bleeding problems  PSYCHIATRIC:  NEGATIVE for changes in mood or affect    PHYSICAL EXAM:  BP (!) 147/69 (BP Location: Left arm, Patient Position: Sitting, Cuff Size: Adult Large)   Pulse 64   Ht 1.93 m (6' 4\")   Wt 97.1 kg (214 lb)   SpO2 98%   BMI 26.05 kg/m     GENERAL APPEARANCE: healthy, alert, and no distress   SKIN: no suspicious lesions or rashes  NEURO: Normal strength and tone, mentation intact, and speech normal  VASCULAR:  good pulses, and cappillary refill   LYMPH: no lymphadenopathy   PSYCH:  mentation appears normal and affect normal/bright  RESP: no increased work of breathing     KNEE EXAM:   Gait: walks with normal gait  Alignment: normal   Squat: 100 % painfree.    Patellofemoral joint: mild crepitations in the patellofemoral joint.  Effusion: none  ROM: full  Tender: medial joint line and mainly anterior  Masses: none  Ligaments:  Lachman's Stable, Anterior and posterior drawer stable, stable to varus and valgus stress.  no pain with Varus/Valgus stress testing.    McMurrays: negative  Lateral retinaculum is not tight      KNEE THREE VIEWS RIGHT  7/14/2023 8:27 AM   No acute fracture or malalignment. Mild " patellofemoral  compartment degenerative changes. Minimal knee joint effusion     Impression: most likely he has a contused IBSN due to kneeling. Other possibilities include a small anterior medial meniscus tear or meniscal cyst not palpable.    Plan:  in any case, the symptoms aren't bad enough to warrant intervention or advanced imaging.  We discussed that an arthroscopy, should there be a tear would likely not make these symptoms better, even if there is an anterior medial meniscus tear.    Return to clinic PRN, if there are more bothersome symptoms.     EFRAIN Pacheco MD  Dept. Orthopedic Surgery  [facility]       Again, thank you for allowing me to participate in the care of your patient.        Sincerely,        Ahsan Pacheco MD

## 2023-09-08 ENCOUNTER — LAB (OUTPATIENT)
Dept: LAB | Facility: CLINIC | Age: 53
End: 2023-09-08
Payer: COMMERCIAL

## 2023-09-08 DIAGNOSIS — Z12.5 SCREENING PSA (PROSTATE SPECIFIC ANTIGEN): ICD-10-CM

## 2023-09-08 DIAGNOSIS — Z00.00 ROUTINE HISTORY AND PHYSICAL EXAMINATION OF ADULT: ICD-10-CM

## 2023-09-08 LAB
ERYTHROCYTE [DISTWIDTH] IN BLOOD BY AUTOMATED COUNT: 12.8 % (ref 10–15)
HCT VFR BLD AUTO: 40.6 % (ref 40–53)
HGB BLD-MCNC: 13.4 G/DL (ref 13.3–17.7)
MCH RBC QN AUTO: 27.3 PG (ref 26.5–33)
MCHC RBC AUTO-ENTMCNC: 33 G/DL (ref 31.5–36.5)
MCV RBC AUTO: 83 FL (ref 78–100)
PLATELET # BLD AUTO: 180 10E3/UL (ref 150–450)
PSA SERPL DL<=0.01 NG/ML-MCNC: 1.3 NG/ML (ref 0–3.5)
RBC # BLD AUTO: 4.91 10E6/UL (ref 4.4–5.9)
WBC # BLD AUTO: 7.7 10E3/UL (ref 4–11)

## 2023-09-08 PROCEDURE — 85027 COMPLETE CBC AUTOMATED: CPT

## 2023-09-08 PROCEDURE — 36415 COLL VENOUS BLD VENIPUNCTURE: CPT

## 2023-09-08 PROCEDURE — G0103 PSA SCREENING: HCPCS

## 2023-10-29 ENCOUNTER — HEALTH MAINTENANCE LETTER (OUTPATIENT)
Age: 53
End: 2023-10-29

## 2023-12-13 DIAGNOSIS — I10 ESSENTIAL HYPERTENSION WITH GOAL BLOOD PRESSURE LESS THAN 140/90: ICD-10-CM

## 2023-12-13 RX ORDER — HYDRALAZINE HYDROCHLORIDE 25 MG/1
TABLET, FILM COATED ORAL
Qty: 180 TABLET | Refills: 1 | Status: SHIPPED | OUTPATIENT
Start: 2023-12-13 | End: 2024-04-19

## 2023-12-13 NOTE — LETTER
December 13, 2023    Teddy Shah  [address] [address]    Dear Teddy,       We recently received a refill request for hydrALAZINE (APRESOLINE) 25 MG tablet .  We have refilled this for a one time 90 day supply only because you are due for a:    Blood pressure/medication check office visit      Please call at your earliest convenience so that there will not be a delay with your future refills.          Thank you,   Your [facility] Team/  [phone]

## 2024-02-25 DIAGNOSIS — E11.9 TYPE 2 DIABETES MELLITUS WITHOUT COMPLICATION, WITHOUT LONG-TERM CURRENT USE OF INSULIN (H): ICD-10-CM

## 2024-02-25 NOTE — LETTER
February 26, 2024    Teddy Shah  [address] [address]    Dear Teddy,       We recently received a refill request for metFORMIN (GLUCOPHAGE XR) 500 MG 24 hr tablet .  We have refilled this for a one time 90 day supply only because you are due for a:    diabetes office visit and fasting lab appointment-needed in the next 2 months per Dr Vick.      Please schedule an office visit with your provider and a lab appointment 4-5 days prior to the office visit.     Please call at your earliest convenience so that there will not be a delay with your future refills.          Thank you,   Your [facility] Team/  [phone]

## 2024-02-26 RX ORDER — METFORMIN HCL 500 MG
TABLET, EXTENDED RELEASE 24 HR ORAL
Qty: 90 TABLET | Refills: 0 | Status: SHIPPED | OUTPATIENT
Start: 2024-02-26 | End: 2024-04-19

## 2024-03-17 ENCOUNTER — HEALTH MAINTENANCE LETTER (OUTPATIENT)
Age: 54
End: 2024-03-17

## 2024-03-29 ENCOUNTER — DOCUMENTATION ONLY (OUTPATIENT)
Dept: FAMILY MEDICINE | Facility: CLINIC | Age: 54
End: 2024-03-29
Payer: COMMERCIAL

## 2024-03-29 DIAGNOSIS — E11.9 TYPE 2 DIABETES MELLITUS WITHOUT COMPLICATION, WITHOUT LONG-TERM CURRENT USE OF INSULIN (H): Primary | Chronic | ICD-10-CM

## 2024-03-29 NOTE — PROGRESS NOTES
Teddy Tellogiuseppe has an upcoming lab appointment:    Future Appointments   Date Time Provider Department Center   4/9/2024 11:00 AM AN [facility]   4/19/2024  8:30 AM Hasmukh Vick MD [facility]     There is no order available. Please review and place either future orders or HMPO (Review of Health Maintenance Protocol Orders), as appropriate.    Health Maintenance Due   Topic    ANNUAL REVIEW OF HM ORDERS     HIV SCREENING     HEPATITIS C SCREENING     MICROALBUMIN     A1C      Mindy ALBERTT

## 2024-04-09 ENCOUNTER — LAB (OUTPATIENT)
Dept: LAB | Facility: CLINIC | Age: 54
End: 2024-04-09
Payer: COMMERCIAL

## 2024-04-09 ENCOUNTER — DOCUMENTATION ONLY (OUTPATIENT)
Dept: FAMILY MEDICINE | Facility: CLINIC | Age: 54
End: 2024-04-09

## 2024-04-09 DIAGNOSIS — E78.5 HYPERLIPIDEMIA LDL GOAL <130: Chronic | ICD-10-CM

## 2024-04-09 DIAGNOSIS — E11.9 TYPE 2 DIABETES MELLITUS WITHOUT COMPLICATION, WITHOUT LONG-TERM CURRENT USE OF INSULIN (H): Chronic | ICD-10-CM

## 2024-04-09 DIAGNOSIS — E78.5 HYPERLIPIDEMIA LDL GOAL <130: Primary | Chronic | ICD-10-CM

## 2024-04-09 LAB — HBA1C MFR BLD: 7.7 % (ref 0–5.6)

## 2024-04-09 PROCEDURE — 80061 LIPID PANEL: CPT

## 2024-04-09 PROCEDURE — 82043 UR ALBUMIN QUANTITATIVE: CPT

## 2024-04-09 PROCEDURE — 83036 HEMOGLOBIN GLYCOSYLATED A1C: CPT

## 2024-04-09 PROCEDURE — 80069 RENAL FUNCTION PANEL: CPT

## 2024-04-09 PROCEDURE — 36415 COLL VENOUS BLD VENIPUNCTURE: CPT

## 2024-04-09 PROCEDURE — 82570 ASSAY OF URINE CREATININE: CPT

## 2024-04-09 NOTE — PROGRESS NOTES
Hi, Vasyl is wondering if he can get his cholesterol checked as well. He was just here to get his labs done. He is fasting and blood is in lab. Please place orders as needed. Thank you.      Mindy DUBOIS

## 2024-04-10 LAB
ALBUMIN SERPL BCG-MCNC: 4.7 G/DL (ref 3.5–5.2)
ANION GAP SERPL CALCULATED.3IONS-SCNC: 11 MMOL/L (ref 7–15)
BUN SERPL-MCNC: 15 MG/DL (ref 6–20)
CALCIUM SERPL-MCNC: 10 MG/DL (ref 8.6–10)
CHLORIDE SERPL-SCNC: 101 MMOL/L (ref 98–107)
CHOLEST SERPL-MCNC: 145 MG/DL
CREAT SERPL-MCNC: 1.09 MG/DL (ref 0.67–1.17)
CREAT UR-MCNC: 74.3 MG/DL
DEPRECATED HCO3 PLAS-SCNC: 27 MMOL/L (ref 22–29)
EGFRCR SERPLBLD CKD-EPI 2021: 81 ML/MIN/1.73M2
GLUCOSE SERPL-MCNC: 131 MG/DL (ref 70–99)
HDLC SERPL-MCNC: 28 MG/DL
LDLC SERPL CALC-MCNC: 75 MG/DL
MICROALBUMIN UR-MCNC: <12 MG/L
MICROALBUMIN/CREAT UR: NORMAL MG/G{CREAT}
NONHDLC SERPL-MCNC: 117 MG/DL
PHOSPHATE SERPL-MCNC: 2.8 MG/DL (ref 2.5–4.5)
POTASSIUM SERPL-SCNC: 4.6 MMOL/L (ref 3.4–5.3)
SODIUM SERPL-SCNC: 139 MMOL/L (ref 135–145)
TRIGL SERPL-MCNC: 209 MG/DL

## 2024-04-19 ENCOUNTER — OFFICE VISIT (OUTPATIENT)
Dept: FAMILY MEDICINE | Facility: CLINIC | Age: 54
End: 2024-04-19
Payer: COMMERCIAL

## 2024-04-19 VITALS
WEIGHT: 221 LBS | TEMPERATURE: 98 F | SYSTOLIC BLOOD PRESSURE: 116 MMHG | OXYGEN SATURATION: 97 % | HEIGHT: 75 IN | BODY MASS INDEX: 27.48 KG/M2 | HEART RATE: 70 BPM | DIASTOLIC BLOOD PRESSURE: 56 MMHG | RESPIRATION RATE: 20 BRPM

## 2024-04-19 DIAGNOSIS — E11.9 TYPE 2 DIABETES MELLITUS WITHOUT COMPLICATION, WITHOUT LONG-TERM CURRENT USE OF INSULIN (H): ICD-10-CM

## 2024-04-19 DIAGNOSIS — I10 ESSENTIAL HYPERTENSION WITH GOAL BLOOD PRESSURE LESS THAN 140/90: ICD-10-CM

## 2024-04-19 PROCEDURE — 90715 TDAP VACCINE 7 YRS/> IM: CPT | Performed by: FAMILY MEDICINE

## 2024-04-19 PROCEDURE — 99214 OFFICE O/P EST MOD 30 MIN: CPT | Mod: 25 | Performed by: FAMILY MEDICINE

## 2024-04-19 PROCEDURE — 90471 IMMUNIZATION ADMIN: CPT | Performed by: FAMILY MEDICINE

## 2024-04-19 RX ORDER — HYDRALAZINE HYDROCHLORIDE 25 MG/1
25 TABLET, FILM COATED ORAL 2 TIMES DAILY
Qty: 180 TABLET | Refills: 1 | Status: SHIPPED | OUTPATIENT
Start: 2024-04-19

## 2024-04-19 RX ORDER — METFORMIN HCL 500 MG
500 TABLET, EXTENDED RELEASE 24 HR ORAL
Qty: 90 TABLET | Refills: 1 | Status: SHIPPED | OUTPATIENT
Start: 2024-04-19

## 2024-04-19 RX ORDER — LOSARTAN POTASSIUM AND HYDROCHLOROTHIAZIDE 25; 100 MG/1; MG/1
TABLET ORAL
Qty: 90 TABLET | Refills: 1 | Status: SHIPPED | OUTPATIENT
Start: 2024-04-19

## 2024-04-19 ASSESSMENT — PAIN SCALES - GENERAL: PAINLEVEL: NO PAIN (1)

## 2024-04-19 NOTE — NURSING NOTE
Prior to immunization administration, verified patients identity using patient s name and date of birth. Please see Immunization Activity for additional information.     Screening Questionnaire for Adult Immunization    Are you sick today?   No   Do you have allergies to medications, food, a vaccine component or latex?   No   Have you ever had a serious reaction after receiving a vaccination?   No   Do you have a long-term health problem with heart, lung, kidney, or metabolic disease (e.g., diabetes), asthma, a blood disorder, no spleen, complement component deficiency, a cochlear implant, or a spinal fluid leak?  Are you on long-term aspirin therapy?   No   Do you have cancer, leukemia, HIV/AIDS, or any other immune system problem?   No   Do you have a parent, brother, or sister with an immune system problem?   No   In the past 3 months, have you taken medications that affect  your immune system, such as prednisone, other steroids, or anticancer drugs; drugs for the treatment of rheumatoid arthritis, Crohn s disease, or psoriasis; or have you had radiation treatments?   No   Have you had a seizure, or a brain or other nervous system problem?   No   During the past year, have you received a transfusion of blood or blood    products, or been given immune (gamma) globulin or antiviral drug?   No   For women: Are you pregnant or is there a chance you could become       pregnant during the next month?   No   Have you received any vaccinations in the past 4 weeks?   No     Immunization questionnaire answers were all negative.      Patient instructed to remain in clinic for 15 minutes afterwards, and to report any adverse reactions.     Screening performed by Tracy Calvert on 4/19/2024 at 8:31 AM.

## 2024-04-19 NOTE — PROGRESS NOTES
ASSESSMENT / PLAN:  (E11.9) Type 2 diabetes mellitus without complication, without long-term current use of insulin (H)  Comment: worse  Plan: metFORMIN (GLUCOPHAGE XR) 500 MG 24 hr tablet        Incresae exercise and some weight lifting. Reveiwed risks and side effects of medication  Double dosage if worse. Follow-up eye exam. Lower carb diet.     (I10) Essential hypertension with goal blood pressure less than 140/90  Comment: stable  Plan: losartan-hydrochlorothiazide (HYZAAR) 100-25 MG        tablet, hydrALAZINE (APRESOLINE) 25 MG tablet        Exercise. Chest pain or shortness of breath to er. Self-monitor. Recheck in 6 months  Sooner if worse/new symptoms.      Subjective   Vasyl is a 53 year old, presenting for the following health issues:\  Follow-up htn, high cholesterol,cervical radiculopathy and dm.  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 Exercise regularly.   No chest pain or shortness of breath. No feet changes.   No nausea, vomiting or diarrhea or black/bloodys stools. No urine changes or hematuria. No urine changes or hematuria.   Emotionally doing ok  Going to [place] for work.   Still playing hockey but was in [place].   Eye exam due this spring.     Recheck Medication      4/19/2024     8:06 AM   Additional Questions   Roomed by MELODIE Calvert CMA         4/19/2024   Forms   Any forms needing to be completed Yes     History of Present Illness       Diabetes:   He presents for follow up of diabetes.  He is checking home blood glucose a few times a month.   He checks blood glucose before meals.  Blood glucose is sometimes over 200 and never under 70.    He is concerned about other.   He is having numbness in feet.  The patient has had a diabetic eye exam in the last 12 months. Eye exam performed on 02-. [facility].        Hyperlipidemia:  He presents for follow up of hyperlipidemia.   He is taking medication to lower cholesterol. He is having myalgia or other side effects to statin  "medications.    Hypertension: He presents for follow up of hypertension.  He does not check blood pressure  regularly outside of the clinic. Outpatient blood pressures have not been over 140/90. He does not follow a low salt diet.     He eats 0-1 servings of fruits and vegetables daily.He consumes 0 sweetened beverage(s) daily.He exercises with enough effort to increase his heart rate 10 to 19 minutes per day.  He exercises with enough effort to increase his heart rate 3 or less days per week.   He is taking medications regularly.           Objective    /56   Pulse 70   Temp 98  F (36.7  C) (Oral)   Resp 20   Ht 1.892 m (6' 2.5\")   Wt 100.2 kg (221 lb)   SpO2 97%   BMI 28.00 kg/m      Physical Exam   GENERAL: alert and no distress  EYES: Eyes grossly normal to inspection, PERRL and conjunctivae and sclerae normal  HENT: ear canals and TM's normal, nose and mouth without ulcers or lesions  NECK: no adenopathy, no asymmetry, masses, or scars  RESP: lungs clear to auscultation - no rales, rhonchi or wheezes  CV: regular rate and rhythm, normal S1 S2, no S3 or S4, no murmur, click or rub, no peripheral edema   ABDOMEN: soft, nontender, no hepatosplenomegaly, no masses and bowel sounds normal  MS: no gross musculoskeletal defects noted, no edema  NEURO: Normal strength and tone, mentation intact and speech normal  PSYCH: mentation appears normal, affect normal/bright            Signed Electronically by: Hasmukh Vick MD    "

## 2024-06-14 ENCOUNTER — PATIENT OUTREACH (OUTPATIENT)
Dept: CARE COORDINATION | Facility: CLINIC | Age: 54
End: 2024-06-14
Payer: COMMERCIAL

## 2024-07-22 DIAGNOSIS — I10 ESSENTIAL HYPERTENSION WITH GOAL BLOOD PRESSURE LESS THAN 140/90: ICD-10-CM

## 2024-07-22 DIAGNOSIS — E78.5 DYSLIPIDEMIA: ICD-10-CM

## 2024-07-22 RX ORDER — ATORVASTATIN CALCIUM 20 MG/1
TABLET, FILM COATED ORAL
Qty: 90 TABLET | Refills: 1 | Status: SHIPPED | OUTPATIENT
Start: 2024-07-22

## 2024-07-22 RX ORDER — AMLODIPINE BESYLATE 5 MG/1
5 TABLET ORAL DAILY
Qty: 90 TABLET | Refills: 1 | Status: SHIPPED | OUTPATIENT
Start: 2024-07-22

## 2024-08-04 ENCOUNTER — HEALTH MAINTENANCE LETTER (OUTPATIENT)
Age: 54
End: 2024-08-04

## 2024-08-08 ENCOUNTER — ANCILLARY PROCEDURE (OUTPATIENT)
Dept: GENERAL RADIOLOGY | Facility: CLINIC | Age: 54
End: 2024-08-08
Attending: PODIATRIST
Payer: COMMERCIAL

## 2024-08-08 ENCOUNTER — OFFICE VISIT (OUTPATIENT)
Dept: PODIATRY | Facility: CLINIC | Age: 54
End: 2024-08-08
Payer: COMMERCIAL

## 2024-08-08 VITALS
SYSTOLIC BLOOD PRESSURE: 158 MMHG | DIASTOLIC BLOOD PRESSURE: 73 MMHG | HEIGHT: 75 IN | BODY MASS INDEX: 27.48 KG/M2 | WEIGHT: 221 LBS

## 2024-08-08 DIAGNOSIS — M25.871 SESAMOIDITIS OF RIGHT FOOT: ICD-10-CM

## 2024-08-08 DIAGNOSIS — G89.29 CHRONIC FOOT PAIN, RIGHT: Primary | ICD-10-CM

## 2024-08-08 DIAGNOSIS — M79.671 CHRONIC FOOT PAIN, RIGHT: Primary | ICD-10-CM

## 2024-08-08 DIAGNOSIS — M79.671 CHRONIC FOOT PAIN, RIGHT: ICD-10-CM

## 2024-08-08 DIAGNOSIS — G89.29 CHRONIC FOOT PAIN, RIGHT: ICD-10-CM

## 2024-08-08 PROCEDURE — 99203 OFFICE O/P NEW LOW 30 MIN: CPT | Performed by: PODIATRIST

## 2024-08-08 PROCEDURE — 73630 X-RAY EXAM OF FOOT: CPT | Mod: TC | Performed by: RADIOLOGY

## 2024-08-08 NOTE — PATIENT INSTRUCTIONS
Helpful products:         Soles  Silicone Shoe Inserts    Amazon: [email or web address] to [email or web address]-Silicone-Orthopedic-Comfortable-Hypoallergenic

## 2024-08-08 NOTE — PROGRESS NOTES
"PATIENT HISTORY:  Teddy Shah is a 54 year old male who presents to clinic as a self referral with a chief complaint of right greater toe/ball of the foot.  The patient is seen by themselves.  The patient relates the pain is primarily located around the ball of the foot.  Reports insidious onset without acute precipitating event.  The patient relates that the symptoms have been going on for several year(s).  The patient has previously tried different shoes, wider shoes, various inserts with little relief.  The patient is currently employed as software training .  Any previous notes and studies that pertain to the patient's condition were reviewed.    Pertinent medical, surgical and family history was reviewed in the [facility] chart.    Past Medical History:   Past Medical History:   Diagnosis Date    Adjustment disorder with anxiety 04/12/2013    Arthritis     Closed fracture of humerus 07/08/2010    Delayed union of fracture 07/08/2010    Diabetes mellitus, type 2 (H) 5/2/2021    HTN (hypertension)     Hypercholesteremia        Medications:   Current Outpatient Medications:     ALLEGRA ALLERGY 180 MG tablet, TAKE 1 TABLET DAILY AS NEEDED FOR ALLERGIES, Disp: 90 tablet, Rfl: 1    amLODIPine (NORVASC) 5 MG tablet, TAKE 1 TABLET DAILY FOR BLOOD PRESSURE, Disp: 90 tablet, Rfl: 1    atorvastatin (LIPITOR) 20 MG tablet, TAKE 1 TABLET DAILY FOR CHOLESTEROL, Disp: 90 tablet, Rfl: 1    hydrALAZINE (APRESOLINE) 25 MG tablet, Take 1 tablet (25 mg) by mouth 2 times daily, Disp: 180 tablet, Rfl: 1    losartan-hydrochlorothiazide (HYZAAR) 100-25 MG tablet, For blood pressure in AM, Disp: 90 tablet, Rfl: 1    magnesium oxide 400 MG CAPS, , Disp: , Rfl:     metFORMIN (GLUCOPHAGE XR) 500 MG 24 hr tablet, Take 1 tablet (500 mg) by mouth daily (with dinner) TAKE 1 TABLET DAILY WITH DINNER FOR DIABETES, Disp: 90 tablet, Rfl: 1     Allergies:  No Known Allergies    Vitals: BP (!) 158/73   Ht 1.892 m (6' 2.5\")   Wt 100.2 kg (221 lb)   " BMI 28.00 kg/m    BMI= Body mass index is 28 kg/m .    LOWER EXTREMITY PHYSICAL EXAM    Dermatologic: Skin is intact to right lower extremity without significant lesions, rash or abrasion.        Vascular: DP & PT pulses are intact & regular on the right.   CFT and skin temperature is normal to the right lower extremity.     Neurologic: Lower extremity sensation is intact to light touch.  No evidence of weakness in the right lower extremity.        Musculoskeletal: Patient is ambulatory without assistive device or brace.  No gross ankle deformity noted.  No foot or ankle joint effusion is noted.  Noted pain on palpation of the sesamoids on the right foot.  Noted pain with maximum dorsiflexion against resistance of the first metatarsophalangeal joint on the right.    Diagnostics:  Radiographs included three views of the right foot demonstrating fracture of the tibial sesamoid  with no cortical erosions or periosteal elevation.  All joint margins appear stable.  There is no apparent fracture or tumor formation noted.  There is no evidence of foreign body.  The images were independently reviewed by myself along with the patient explaining the findings.      ASSESSMENT / PLAN:     ICD-10-CM    1. Chronic foot pain, right  M79.671 XR Foot Right G/E 3 Views    G89.29       2. Sesamoiditis of right foot  M25.871           I have explained to Teddy about the conditions.  We discussed the underlying contributing factors to the condition as well as both conservative and surgical treatment options along with expected length of recovery.  At this time, the patient was educated on the importance of offloading supportive shoes and other devices.  I demonstrated to the patient calf stretches to perform every hour daily until symptoms resolve.  After symptoms resolve, the patient was advised to perform the stretches 3 times daily to prevent future recurrence.  The patient was instructed to perform warm soaks with Epson salt after  which to also apply over-the-counter Voltaren gel to deeply massage the injured tissue.  The patient was instructed to do this on a daily basis until symptoms resolve.    The patient may return in four weeks for reevaluation to determine if any further treatment will be needed.      Teddy verbalized agreement with and understanding of the rational for the diagnosis and treatment plan.  All questions were answered to best of my ability and the patient's satisfaction. The patient was advised to contact the clinic with any questions that may arise after the clinic visit.      Disclaimer: This note consists of symbols derived from keyboarding, dictation and/or voice recognition software. As a result, there may be errors in the script that have gone undetected. Please consider this when interpreting information found in this chart.       EFRAIN Ragland D.P.M., F.A.C.F.A.S.

## 2024-09-27 ENCOUNTER — PATIENT OUTREACH (OUTPATIENT)
Dept: FAMILY MEDICINE | Facility: CLINIC | Age: 54
End: 2024-09-27
Payer: COMMERCIAL

## 2024-09-27 ENCOUNTER — MYC MEDICAL ADVICE (OUTPATIENT)
Dept: FAMILY MEDICINE | Facility: CLINIC | Age: 54
End: 2024-09-27
Payer: COMMERCIAL

## 2024-09-27 NOTE — TELEPHONE ENCOUNTER
Patient Quality Outreach    Patient is due for the following:   Diabetes -  A1C and Microalbumin  Physical Preventive Adult Physical    Next Steps:   Schedule a Adult Preventative    Type of outreach:    Sent Tagboard message.      Questions for provider review:    None           Kita Ramires, CMA

## 2024-10-13 ENCOUNTER — HEALTH MAINTENANCE LETTER (OUTPATIENT)
Age: 54
End: 2024-10-13

## 2024-10-22 ENCOUNTER — LAB (OUTPATIENT)
Dept: LAB | Facility: CLINIC | Age: 54
End: 2024-10-22
Payer: COMMERCIAL

## 2024-10-22 DIAGNOSIS — E11.9 TYPE 2 DIABETES MELLITUS WITHOUT COMPLICATION, WITHOUT LONG-TERM CURRENT USE OF INSULIN (H): Chronic | ICD-10-CM

## 2024-10-22 DIAGNOSIS — Z00.00 PREVENTATIVE HEALTH CARE: ICD-10-CM

## 2024-10-22 DIAGNOSIS — Z12.5 SCREENING PSA (PROSTATE SPECIFIC ANTIGEN): ICD-10-CM

## 2024-10-22 LAB
ALBUMIN SERPL BCG-MCNC: 4.4 G/DL (ref 3.5–5.2)
ANION GAP SERPL CALCULATED.3IONS-SCNC: 10 MMOL/L (ref 7–15)
BUN SERPL-MCNC: 17.9 MG/DL (ref 6–20)
CALCIUM SERPL-MCNC: 9.4 MG/DL (ref 8.8–10.4)
CHLORIDE SERPL-SCNC: 102 MMOL/L (ref 98–107)
CREAT SERPL-MCNC: 1.09 MG/DL (ref 0.67–1.17)
EGFRCR SERPLBLD CKD-EPI 2021: 80 ML/MIN/1.73M2
ERYTHROCYTE [DISTWIDTH] IN BLOOD BY AUTOMATED COUNT: 12.3 % (ref 10–15)
EST. AVERAGE GLUCOSE BLD GHB EST-MCNC: 163 MG/DL
GLUCOSE SERPL-MCNC: 145 MG/DL (ref 70–99)
HBA1C MFR BLD: 7.3 % (ref 0–5.6)
HCO3 SERPL-SCNC: 27 MMOL/L (ref 22–29)
HCT VFR BLD AUTO: 41 % (ref 40–53)
HGB BLD-MCNC: 13.1 G/DL (ref 13.3–17.7)
MCH RBC QN AUTO: 26.8 PG (ref 26.5–33)
MCHC RBC AUTO-ENTMCNC: 32 G/DL (ref 31.5–36.5)
MCV RBC AUTO: 84 FL (ref 78–100)
PHOSPHATE SERPL-MCNC: 2.4 MG/DL (ref 2.5–4.5)
PLATELET # BLD AUTO: 179 10E3/UL (ref 150–450)
POTASSIUM SERPL-SCNC: 4.2 MMOL/L (ref 3.4–5.3)
PSA SERPL DL<=0.01 NG/ML-MCNC: 1.31 NG/ML (ref 0–3.5)
RBC # BLD AUTO: 4.88 10E6/UL (ref 4.4–5.9)
SODIUM SERPL-SCNC: 139 MMOL/L (ref 135–145)
WBC # BLD AUTO: 7.4 10E3/UL (ref 4–11)

## 2024-10-22 PROCEDURE — G0103 PSA SCREENING: HCPCS

## 2024-10-22 PROCEDURE — 85027 COMPLETE CBC AUTOMATED: CPT

## 2024-10-22 PROCEDURE — 83036 HEMOGLOBIN GLYCOSYLATED A1C: CPT

## 2024-10-22 PROCEDURE — 36415 COLL VENOUS BLD VENIPUNCTURE: CPT

## 2024-10-22 PROCEDURE — 80069 RENAL FUNCTION PANEL: CPT

## 2024-11-11 SDOH — HEALTH STABILITY: PHYSICAL HEALTH: ON AVERAGE, HOW MANY DAYS PER WEEK DO YOU ENGAGE IN MODERATE TO STRENUOUS EXERCISE (LIKE A BRISK WALK)?: 2 DAYS

## 2024-11-11 SDOH — HEALTH STABILITY: PHYSICAL HEALTH: ON AVERAGE, HOW MANY MINUTES DO YOU ENGAGE IN EXERCISE AT THIS LEVEL?: 60 MIN

## 2024-11-11 ASSESSMENT — SOCIAL DETERMINANTS OF HEALTH (SDOH): HOW OFTEN DO YOU GET TOGETHER WITH FRIENDS OR RELATIVES?: ONCE A WEEK

## 2024-11-14 DIAGNOSIS — I10 ESSENTIAL HYPERTENSION WITH GOAL BLOOD PRESSURE LESS THAN 140/90: ICD-10-CM

## 2024-11-14 DIAGNOSIS — E11.9 TYPE 2 DIABETES MELLITUS WITHOUT COMPLICATION, WITHOUT LONG-TERM CURRENT USE OF INSULIN (H): ICD-10-CM

## 2024-11-14 RX ORDER — HYDRALAZINE HYDROCHLORIDE 25 MG/1
25 TABLET, FILM COATED ORAL 2 TIMES DAILY
Qty: 180 TABLET | Refills: 0 | Status: SHIPPED | OUTPATIENT
Start: 2024-11-14

## 2024-11-14 RX ORDER — METFORMIN HYDROCHLORIDE 500 MG/1
TABLET, EXTENDED RELEASE ORAL
Qty: 90 TABLET | Refills: 0 | Status: SHIPPED | OUTPATIENT
Start: 2024-11-14

## 2024-11-15 ENCOUNTER — TELEPHONE (OUTPATIENT)
Dept: FAMILY MEDICINE | Facility: CLINIC | Age: 54
End: 2024-11-15

## 2024-11-15 ENCOUNTER — OFFICE VISIT (OUTPATIENT)
Dept: FAMILY MEDICINE | Facility: CLINIC | Age: 54
End: 2024-11-15
Payer: COMMERCIAL

## 2024-11-15 VITALS
HEART RATE: 70 BPM | HEIGHT: 75 IN | RESPIRATION RATE: 16 BRPM | DIASTOLIC BLOOD PRESSURE: 69 MMHG | OXYGEN SATURATION: 100 % | WEIGHT: 219 LBS | BODY MASS INDEX: 27.23 KG/M2 | SYSTOLIC BLOOD PRESSURE: 131 MMHG | TEMPERATURE: 96.4 F

## 2024-11-15 DIAGNOSIS — G89.29 CHRONIC PAIN OF BOTH KNEES: ICD-10-CM

## 2024-11-15 DIAGNOSIS — E11.9 TYPE 2 DIABETES MELLITUS WITHOUT COMPLICATION, WITHOUT LONG-TERM CURRENT USE OF INSULIN (H): ICD-10-CM

## 2024-11-15 DIAGNOSIS — E78.5 DYSLIPIDEMIA: ICD-10-CM

## 2024-11-15 DIAGNOSIS — I10 ESSENTIAL HYPERTENSION WITH GOAL BLOOD PRESSURE LESS THAN 140/90: ICD-10-CM

## 2024-11-15 DIAGNOSIS — M25.561 CHRONIC PAIN OF BOTH KNEES: ICD-10-CM

## 2024-11-15 DIAGNOSIS — Z00.00 ROUTINE GENERAL MEDICAL EXAMINATION AT A HEALTH CARE FACILITY: Primary | ICD-10-CM

## 2024-11-15 DIAGNOSIS — M25.562 CHRONIC PAIN OF BOTH KNEES: ICD-10-CM

## 2024-11-15 RX ORDER — AMLODIPINE BESYLATE 5 MG/1
5 TABLET ORAL DAILY
Qty: 90 TABLET | Refills: 1 | Status: SHIPPED | OUTPATIENT
Start: 2024-11-15

## 2024-11-15 RX ORDER — ATORVASTATIN CALCIUM 20 MG/1
TABLET, FILM COATED ORAL
Qty: 90 TABLET | Refills: 3 | Status: SHIPPED | OUTPATIENT
Start: 2024-11-15

## 2024-11-15 RX ORDER — LOSARTAN POTASSIUM AND HYDROCHLOROTHIAZIDE 25; 100 MG/1; MG/1
TABLET ORAL
Qty: 90 TABLET | Refills: 1 | Status: SHIPPED | OUTPATIENT
Start: 2024-11-15

## 2024-11-15 NOTE — PROGRESS NOTES
Preventive Care Visit  [facility]  Hasmukh Vick MD, Family Medicine  Nov 15, 2024      ASSESSMENT / PLAN:  (Z00.00) Routine general medical examination at a health care facility  (primary encounter diagnosis)  Comment: generally healthy and normal exam/labs  Plan: Reviewed self mole/testicle check handout.  vitaminD. Continue exercise. Rpeat colonoscopy 2 years - sooner if black/bloody stools. Increase iron in diet. Return to clinic if worsening fatigue/abdominal pain/stool changes. Call/email with questions/concerns        (M25.561,  M25.562,  G89.29) Chronic pain of both knees    Plan: Physical Therapy  Referral        Will start with p.t.. ortho if worse.    (E11.9) Type 2 diabetes mellitus without complication, without long-term current use of insulin (H)  Comment: sable  Plan: continue metformin. Follow-up eye exam. Recheck in 6 months      (I10) Essential hypertension with goal blood pressure less than 140/90  Comment: stable  Plan: losartan-hydrochlorothiazide (HYZAAR) 100-25 MG        tablet, amLODIPine (NORVASC) 5 MG tablet        Continue self-montior. Reveiwed risks and side effects of medication  Recheck in 6 months  Sooner if worse    (E78.5) Dyslipidemia  Comment: stable  Plan: atorvastatin (LIPITOR) 20 MG tablet        Chest pain or shortness of breath to er.       Subjective   Vasyl is a 54 year old, presenting for the following:  Physical and Knee Problem (Left knee)  Follow-up htn, high cholesterol,cervical radiculopathy and dm.  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 Exercise regularly.   No chest pain or shortness of breath. No feet changes. Eye exam in spring.  No nausea, vomiting or diarrhea or black/bloodys stools. No urine changes or hematuria.   Emotionally doing ok. No abdominal pain. No gerd. No testicle pain/masses/hernia. No std concerns. Outside blood pressure ok. Blood pressure cuff.   Patient getting repeat heart scan in future. Normal 2016.   Tylenol prn. Occasionally  ALCOHOL.  No asa/nsaids.   Colonoscopy a polyp 3 years - due repeat 2 days.   2 daughters and 1 son. Daughter moved in with  and 4 kids.  Wife in [place]- leaving next week. No mole changers or rashes.   Sleep overall ok. Occasionally beer.  No testicle pain/masses/hernia. No std concerns.   Dad cad at 46yo - smoker. Mom alive - copd no mi's. Sister doing ok.       11/15/2024     8:14 AM   Additional Questions   Roomed by Kita   Accompanied by self         11/15/2024     8:14 AM   Patient Reported Additional Medications   Patient reports taking the following new medications n/a          HPI          Health Care Directive  Patient does not have a Health Care Directive: Discussed advance care planning with patient; however, patient declined at this time.      11/11/2024   General Health   How would you rate your overall physical health? Good   Feel stress (tense, anxious, or unable to sleep) Only a little      (!) STRESS CONCERN      11/11/2024   Nutrition   Three or more servings of calcium each day? (!) I DON'T KNOW   Diet: I don't know   How many servings of fruit and vegetables per day? (!) 0-1   How many sweetened beverages each day? 0-1            11/11/2024   Exercise   Days per week of moderate/strenous exercise 2 days   Average minutes spent exercising at this level 60 min      (!) EXERCISE CONCERN      11/11/2024   Social Factors   Frequency of gathering with friends or relatives Once a week   Worry food won't last until get money to buy more No   Food not last or not have enough money for food? No   Do you have housing? (Housing is defined as stable permanent housing and does not include staying ouside in a car, in a tent, in an abandoned building, in an overnight shelter, or couch-surfing.) Yes   Are you worried about losing your housing? No   Lack of transportation? No   Unable to get utilities (heat,electricity)? No            11/11/2024   Fall Risk   Fallen 2 or more times in the past year? No   "  Trouble with walking or balance? No        Patient-reported          11/11/2024   Dental   Dentist two times every year? Yes            11/11/2024   TB Screening   Were you born outside of the [place]? No                  11/11/2024   Substance Use   Alcohol more than 3/day or more than 7/wk No   Do you use any other substances recreationally? No        Social History     Tobacco Use    Smoking status: Never    Smokeless tobacco: Never    Tobacco comments:     Lives in smoke free household   Vaping Use    Vaping status: Never Used   Substance Use Topics    Alcohol use: Yes     Comment: occasional    Drug use: No             11/11/2024   One time HIV Screening   Previous HIV test? No          11/11/2024   STI Screening   New sexual partner(s) since last STI/HIV test? No      Last PSA:   PSA   Date Value Ref Range Status   03/01/2021 1.71 0 - 4 ug/L Final     Comment:     Assay Method:  Chemiluminescence using Siemens Vista analyzer     Prostate Specific Antigen Screen   Date Value Ref Range Status   10/22/2024 1.31 0.00 - 3.50 ng/mL Final   08/14/2022 1.04 0.00 - 4.00 ug/L Final     ASCVD Risk   The 10-year ASCVD risk score (Tonya REYES, et al., 2019) is: 21.8%    Values used to calculate the score:      Age: 54 years      Sex: Male      Is Non- : No      Diabetic: Yes      Tobacco smoker: No      Systolic Blood Pressure: 168 mmHg      Is BP treated: Yes      HDL Cholesterol: 28 mg/dL      Total Cholesterol: 145 mg/dL           Reviewed and updated as needed this visit by Provider                             Objective    Exam  /69   Pulse 70   Temp (!) 96.4  F (35.8  C) (Tympanic)   Resp 16   Ht 1.905 m (6' 3\")   Wt 99.3 kg (219 lb)   SpO2 100%   BMI 27.37 kg/m      Physical Exam  GENERAL: alert and no distress  EYES: Eyes grossly normal to inspection, PERRL and conjunctivae and sclerae normal  HENT: ear canals and TM's normal, nose and mouth without ulcers or lesions  NECK: " no adenopathy, no asymmetry, masses, or scars  RESP: lungs clear to auscultation - no rales, rhonchi or wheezes  BREAST: normal without masses, tenderness or nipple discharge and no palpable axillary masses or adenopathy  CV: regular rate and rhythm, normal S1 S2, no S3 or S4, no murmur, click or rub, no peripheral edema   ABDOMEN: soft, nontender, no hepatosplenomegaly, no masses and bowel sounds normal   (male): patient deferred /rectal exams  MS: no gross musculoskeletal defects noted, no edema  SKIN: no suspicious lesions or rashes  NEURO: Normal strength and tone, mentation intact and speech normal  PSYCH: mentation appears normal, affect normal/bright  LYMPH: no cervical, supraclavicular, axillary,adenopathy        Signed Electronically by: Hasmukh Vick MD

## 2024-11-15 NOTE — PATIENT INSTRUCTIONS
Patient Education   Preventive Care Advice   This is general advice given by our system to help you stay healthy. However, your care team may have specific advice just for you. Please talk to your care team about your preventive care needs.  Nutrition  Eat 5 or more servings of fruits and vegetables each day.  Try wheat bread, brown rice and whole grain pasta (instead of white bread, rice, and pasta).  Get enough calcium and vitamin D. Check the label on foods and aim for 100% of the RDA (recommended daily allowance).  Lifestyle  Exercise at least 150 minutes each week  (30 minutes a day, 5 days a week).  Do muscle strengthening activities 2 days a week. These help control your weight and prevent disease.  No smoking.  Wear sunscreen to prevent skin cancer.  Have a dental exam and cleaning every 6 months.  Yearly exams  See your health care team every year to talk about:  Any changes in your health.  Any medicines your care team has prescribed.  Preventive care, family planning, and ways to prevent chronic diseases.  Shots (vaccines)   HPV shots (up to age 26), if you've never had them before.  Hepatitis B shots (up to age 59), if you've never had them before.  COVID-19 shot: Get this shot when it's due.  Flu shot: Get a flu shot every year.  Tetanus shot: Get a tetanus shot every 10 years.  Pneumococcal, hepatitis A, and RSV shots: Ask your care team if you need these based on your risk.  Shingles shot (for age 50 and up)  General health tests  Diabetes screening:  Starting at age 35, Get screened for diabetes at least every 3 years.  If you are younger than age 35, ask your care team if you should be screened for diabetes.  Cholesterol test: At age 39, start having a cholesterol test every 5 years, or more often if advised.  Bone density scan (DEXA): At age 50, ask your care team if you should have this scan for osteoporosis (brittle bones).  Hepatitis C: Get tested at least once in your life.  STIs (sexually  transmitted infections)  Before age 24: Ask your care team if you should be screened for STIs.  After age 24: Get screened for STIs if you're at risk. You are at risk for STIs (including HIV) if:  You are sexually active with more than one person.  You don't use condoms every time.  You or a partner was diagnosed with a sexually transmitted infection.  If you are at risk for HIV, ask about PrEP medicine to prevent HIV.  Get tested for HIV at least once in your life, whether you are at risk for HIV or not.  Cancer screening tests  Cervical cancer screening: If you have a cervix, begin getting regular cervical cancer screening tests starting at age 21.  Breast cancer scan (mammogram): If you've ever had breasts, begin having regular mammograms starting at age 40. This is a scan to check for breast cancer.  Colon cancer screening: It is important to start screening for colon cancer at age 45.  Have a colonoscopy test every 10 years (or more often if you're at risk) Or, ask your provider about stool tests like a FIT test every year or Cologuard test every 3 years.  To learn more about your testing options, visit:   .  For help making a decision, visit:   https://bit.ly/oz82316.  Prostate cancer screening test: If you have a prostate, ask your care team if a prostate cancer screening test (PSA) at age 55 is right for you.  Lung cancer screening: If you are a current or former smoker ages 50 to 80, ask your care team if ongoing lung cancer screenings are right for you.  For informational purposes only. Not to replace the advice of your health care provider. Copyright   2023 [facility] [employer]. All rights reserved. Clinically reviewed by the [facility] Transitions Program. [employer] 207806 - REV 01/24.     Patient Education   Preventive Care Advice   This is general advice given by our system to help you stay healthy. However, your care team may have specific advice just for you. Please talk to your care team  about your preventive care needs.  Nutrition  Eat 5 or more servings of fruits and vegetables each day.  Try wheat bread, brown rice and whole grain pasta (instead of white bread, rice, and pasta).  Get enough calcium and vitamin D. Check the label on foods and aim for 100% of the RDA (recommended daily allowance).  Lifestyle  Exercise at least 150 minutes each week  (30 minutes a day, 5 days a week).  Do muscle strengthening activities 2 days a week. These help control your weight and prevent disease.  No smoking.  Wear sunscreen to prevent skin cancer.  Have a dental exam and cleaning every 6 months.  Yearly exams  See your health care team every year to talk about:  Any changes in your health.  Any medicines your care team has prescribed.  Preventive care, family planning, and ways to prevent chronic diseases.  Shots (vaccines)   HPV shots (up to age 26), if you've never had them before.  Hepatitis B shots (up to age 59), if you've never had them before.  COVID-19 shot: Get this shot when it's due.  Flu shot: Get a flu shot every year.  Tetanus shot: Get a tetanus shot every 10 years.  Pneumococcal, hepatitis A, and RSV shots: Ask your care team if you need these based on your risk.  Shingles shot (for age 50 and up)  General health tests  Diabetes screening:  Starting at age 35, Get screened for diabetes at least every 3 years.  If you are younger than age 35, ask your care team if you should be screened for diabetes.  Cholesterol test: At age 39, start having a cholesterol test every 5 years, or more often if advised.  Bone density scan (DEXA): At age 50, ask your care team if you should have this scan for osteoporosis (brittle bones).  Hepatitis C: Get tested at least once in your life.  STIs (sexually transmitted infections)  Before age 24: Ask your care team if you should be screened for STIs.  After age 24: Get screened for STIs if you're at risk. You are at risk for STIs (including HIV) if:  You are  sexually active with more than one person.  You don't use condoms every time.  You or a partner was diagnosed with a sexually transmitted infection.  If you are at risk for HIV, ask about PrEP medicine to prevent HIV.  Get tested for HIV at least once in your life, whether you are at risk for HIV or not.  Cancer screening tests  Cervical cancer screening: If you have a cervix, begin getting regular cervical cancer screening tests starting at age 21.  Breast cancer scan (mammogram): If you've ever had breasts, begin having regular mammograms starting at age 40. This is a scan to check for breast cancer.  Colon cancer screening: It is important to start screening for colon cancer at age 45.  Have a colonoscopy test every 10 years (or more often if you're at risk) Or, ask your provider about stool tests like a FIT test every year or Cologuard test every 3 years.  To learn more about your testing options, visit:   .  For help making a decision, visit:   https://bit.ly/st80528.  Prostate cancer screening test: If you have a prostate, ask your care team if a prostate cancer screening test (PSA) at age 55 is right for you.  Lung cancer screening: If you are a current or former smoker ages 50 to 80, ask your care team if ongoing lung cancer screenings are right for you.  For informational purposes only. Not to replace the advice of your health care provider. Copyright   2023 [employer]. All rights reserved. Clinically reviewed by the [facility] Transitions Program. [employer] 659337 - REV 01/24.

## 2024-12-13 ASSESSMENT — ACTIVITIES OF DAILY LIVING (ADL)
SQUAT: ACTIVITY IS SOMEWHAT DIFFICULT
WALK: ACTIVITY IS MINIMALLY DIFFICULT
STIFFNESS: I DO NOT HAVE THE SYMPTOM
KNEEL ON THE FRONT OF YOUR KNEE: ACTIVITY IS VERY DIFFICULT
HOW_WOULD_YOU_RATE_THE_CURRENT_FUNCTION_OF_YOUR_KNEE_DURING_YOUR_USUAL_DAILY_ACTIVITIES_ON_A_SCALE_FROM_0_TO_100_WITH_100_BEING_YOUR_LEVEL_OF_KNEE_FUNCTION_PRIOR_TO_YOUR_INJURY_AND_0_BEING_THE_INABILITY_TO_PERFORM_ANY_OF_YOUR_USUAL_DAILY_ACTIVITIES?: 85
GO DOWN STAIRS: ACTIVITY IS SOMEWHAT DIFFICULT
WEAKNESS: THE SYMPTOM AFFECTS MY ACTIVITY SLIGHTLY
RAW_SCORE: 48.46
GO DOWN STAIRS: ACTIVITY IS SOMEWHAT DIFFICULT
RISE FROM A CHAIR: ACTIVITY IS MINIMALLY DIFFICULT
RISE FROM A CHAIR: ACTIVITY IS MINIMALLY DIFFICULT
HOW_WOULD_YOU_RATE_THE_CURRENT_FUNCTION_OF_YOUR_KNEE_DURING_YOUR_USUAL_DAILY_ACTIVITIES_ON_A_SCALE_FROM_0_TO_100_WITH_100_BEING_YOUR_LEVEL_OF_KNEE_FUNCTION_PRIOR_TO_YOUR_INJURY_AND_0_BEING_THE_INABILITY_TO_PERFORM_ANY_OF_YOUR_USUAL_DAILY_ACTIVITIES?: 85
SWELLING: THE SYMPTOM AFFECTS MY ACTIVITY MODERATELY
KNEE_ACTIVITY_OF_DAILY_LIVING_SUM: 45
SQUAT: ACTIVITY IS SOMEWHAT DIFFICULT
GO UP STAIRS: ACTIVITY IS SOMEWHAT DIFFICULT
STAND: ACTIVITY IS NOT DIFFICULT
PAIN: THE SYMPTOM AFFECTS MY ACTIVITY MODERATELY
SWELLING: THE SYMPTOM AFFECTS MY ACTIVITY MODERATELY
STAND: ACTIVITY IS NOT DIFFICULT
KNEEL ON THE FRONT OF YOUR KNEE: ACTIVITY IS VERY DIFFICULT
GO UP STAIRS: ACTIVITY IS SOMEWHAT DIFFICULT
HOW_WOULD_YOU_RATE_THE_OVERALL_FUNCTION_OF_YOUR_KNEE_DURING_YOUR_USUAL_DAILY_ACTIVITIES?: NEARLY NORMAL
HOW_WOULD_YOU_RATE_THE_OVERALL_FUNCTION_OF_YOUR_KNEE_DURING_YOUR_USUAL_DAILY_ACTIVITIES?: NEARLY NORMAL
SIT WITH YOUR KNEE BENT: ACTIVITY IS NOT DIFFICULT
AS_A_RESULT_OF_YOUR_KNEE_INJURY,_HOW_WOULD_YOU_RATE_YOUR_CURRENT_LEVEL_OF_DAILY_ACTIVITY?: NEARLY NORMAL
PLEASE_INDICATE_YOR_PRIMARY_REASON_FOR_REFERRAL_TO_THERAPY:: KNEE
LIMPING: I DO NOT HAVE THE SYMPTOM
PAIN: THE SYMPTOM AFFECTS MY ACTIVITY MODERATELY
STIFFNESS: I DO NOT HAVE THE SYMPTOM
AS_A_RESULT_OF_YOUR_KNEE_INJURY,_HOW_WOULD_YOU_RATE_YOUR_CURRENT_LEVEL_OF_DAILY_ACTIVITY?: NEARLY NORMAL
WEAKNESS: THE SYMPTOM AFFECTS MY ACTIVITY SLIGHTLY
LIMPING: I DO NOT HAVE THE SYMPTOM
SIT WITH YOUR KNEE BENT: ACTIVITY IS NOT DIFFICULT
WALK: ACTIVITY IS MINIMALLY DIFFICULT
KNEE_ACTIVITY_OF_DAILY_LIVING_SCORE: 69.23

## 2024-12-17 ENCOUNTER — THERAPY VISIT (OUTPATIENT)
Dept: PHYSICAL THERAPY | Facility: CLINIC | Age: 54
End: 2024-12-17
Attending: FAMILY MEDICINE
Payer: COMMERCIAL

## 2024-12-17 ENCOUNTER — MYC MEDICAL ADVICE (OUTPATIENT)
Dept: FAMILY MEDICINE | Facility: CLINIC | Age: 54
End: 2024-12-17

## 2024-12-17 DIAGNOSIS — M25.561 CHRONIC PAIN OF BOTH KNEES: ICD-10-CM

## 2024-12-17 DIAGNOSIS — G89.29 CHRONIC PAIN OF BOTH KNEES: ICD-10-CM

## 2024-12-17 DIAGNOSIS — M25.562 CHRONIC PAIN OF BOTH KNEES: ICD-10-CM

## 2024-12-17 PROCEDURE — 97530 THERAPEUTIC ACTIVITIES: CPT | Mod: GP | Performed by: PHYSICAL THERAPIST

## 2024-12-17 PROCEDURE — 97161 PT EVAL LOW COMPLEX 20 MIN: CPT | Mod: GP | Performed by: PHYSICAL THERAPIST

## 2024-12-17 PROCEDURE — 97110 THERAPEUTIC EXERCISES: CPT | Mod: GP | Performed by: PHYSICAL THERAPIST

## 2024-12-17 NOTE — PROGRESS NOTES
PHYSICAL THERAPY EVALUATION  Type of Visit: Evaluation    See electronic medical record for Abuse and Falls Screening details.            Subjective     REFERRAL DX: Chronic pain of both knees L>R    SUBJECTIVE HISTORY: Patient reports it sx started off after doing some kneeling and doing work around the hose. Day to day things generally feels okay. However after hockey, motorcross can have 24 hours + of flare ups.  After these activities, can have trouble walking for about 24 hours. Hard to tell if it swells but can keep him up at night at times.  He feels like he needs pads for kneeling  all the time now. No previous injuries. Denies any catching or clicking, occasional buckling.     PAIN:  At best: 0/10  At worst: 10/10  Frequency: with activities/positions  Quality: can be sharp  Progression:  no change  Exacerbated by:  kneeling   Eased by: activity modifications       Objective         Presenting condition or subjective complaint: (Patient-Rptd) Knee pain  Date of onset: 12/17/24    Relevant medical history:     Dates & types of surgery:      Prior diagnostic imaging/testing results:         IMPRESSION: No acute fracture or malalignment. Mild patellofemoral  compartment degenerative changes. Minimal knee joint effusion.    Prior therapy history for the same diagnosis, illness or injury: (Patient-Rptd) No      Living Environment  Social support: (Patient-Rptd) With family members   Type of home: (Patient-Rptd) House   Stairs to enter the home: (Patient-Rptd) Yes (Patient-Rptd) 4 Is there a railing: (Patient-Rptd) Yes     Ramp: (Patient-Rptd) No   Stairs inside the home: (Patient-Rptd) Yes (Patient-Rptd) 14 Is there a railing: (Patient-Rptd) Yes     Help at home: (Patient-Rptd) Home management tasks (cooking, cleaning)  Equipment owned: (Patient-Rptd) Crutches     Employment: (Patient-Rptd) Yes (Patient-Rptd) [employer]   Hobbies/Interests: (Patient-Rptd) Hockey, Pickleball, Motocross    Patient  goals for therapy: (Patient-Rptd) Kneel -not have pain    KNEE EVALUATION  ROM:   (Degrees) Left AROM Left PROM  Right AROM Right PROM   Knee Flexion 130  130    Knee Extension 0  0      Knee girth at joint line R/L: 40.5cm bilaterally    STRENGTH:   Pain: - none + mild ++ moderate +++ severe  Strength Scale: 0-5/5 Left Right   Hip Flexion 5 5   Hip Extension 5 5   Hip Abduction 5 5   Hip Internal Rotation 5 5   Hip External Rotation 5 5   Knee Flexion 5 5   Knee Extension 5 5     SPECIAL TESTS:    Left Right   Apley's (Meniscus) Negative  Negative    Halley's (Meniscus) Negative  Negative    Mary's (ITB/TFL) Negative  Negative    Patellar Apprehension Test Negative  Negative    Patella Tracking Slight restriction with superior glides Slight restriction with superior glides     PALPATION: painful to inferior L fat pad, inferior patella; scarring? Or tougher tissue noted to patellar tendon  GAIT: 10 degree toe out R vs 0 on L, mild L trendelenburg  OBS: pain with any knee over toe position to inferior pole      Assessment & Plan   CLINICAL IMPRESSIONS  Medical Diagnosis: Bilateral knee pain    Treatment Diagnosis: Bilateral knee pain   Impression/Assessment: Patient is a 54 year old male with B knee pain complaints.  The following significant findings have been identified: Pain, Decreased ROM/flexibility, Inflammation, and Edema. These impairments interfere with their ability to perform recreational activities and kneeling  as compared to previous level of function.     Clinical Decision Making (Complexity):  Clinical Presentation: Stable/Uncomplicated  Clinical Presentation Rationale: based on medical and personal factors listed in PT evaluation  Clinical Decision Making (Complexity): Low complexity    PLAN OF CARE  Treatment Interventions:  Modalities: Cryotherapy, Dry Needling, E-stim, Hot Pack  Interventions: Gait Training, Manual Therapy, Neuromuscular Re-education, Therapeutic Activity, Therapeutic Exercise,  Self-Care/Home Management    Long Term Goals     PT Goal 1  Goal Description: Patient will demo 50% reduction in B knee pain with kneeling  Rationale: to maximize safety and independence with performance of ADLs and functional tasks  Target Date: 03/11/25      Frequency of Treatment: biweekly  Duration of Treatment: 8-12 weeks    Education Assessment:   Learner/Method: Patient  Education Comments: PTRX    Risks and benefits of evaluation/treatment have been explained.   Patient/Family/caregiver agrees with Plan of Care.     Evaluation Time:     PT Eval, Low Complexity Minutes (01356): 15       Signing Clinician: Naomi Rodriguez PT

## 2025-01-25 ENCOUNTER — HEALTH MAINTENANCE LETTER (OUTPATIENT)
Age: 55
End: 2025-01-25

## 2025-02-16 ENCOUNTER — MYC MEDICAL ADVICE (OUTPATIENT)
Dept: FAMILY MEDICINE | Facility: CLINIC | Age: 55
End: 2025-02-16
Payer: COMMERCIAL

## 2025-02-16 DIAGNOSIS — I10 ESSENTIAL HYPERTENSION WITH GOAL BLOOD PRESSURE LESS THAN 140/90: ICD-10-CM

## 2025-02-16 DIAGNOSIS — E78.5 DYSLIPIDEMIA: ICD-10-CM

## 2025-02-17 RX ORDER — LOSARTAN POTASSIUM AND HYDROCHLOROTHIAZIDE 25; 100 MG/1; MG/1
TABLET ORAL
Qty: 90 TABLET | Refills: 0 | Status: SHIPPED | OUTPATIENT
Start: 2025-02-17

## 2025-02-17 RX ORDER — AMLODIPINE BESYLATE 5 MG/1
5 TABLET ORAL DAILY
Qty: 90 TABLET | Refills: 0 | Status: SHIPPED | OUTPATIENT
Start: 2025-02-17

## 2025-02-17 RX ORDER — ATORVASTATIN CALCIUM 20 MG/1
TABLET, FILM COATED ORAL
Qty: 90 TABLET | Refills: 2 | Status: SHIPPED | OUTPATIENT
Start: 2025-02-17

## 2025-03-21 ENCOUNTER — DOCUMENTATION ONLY (OUTPATIENT)
Dept: LAB | Facility: CLINIC | Age: 55
End: 2025-03-21
Payer: COMMERCIAL

## 2025-03-21 DIAGNOSIS — E78.5 HYPERLIPIDEMIA LDL GOAL <130: Chronic | ICD-10-CM

## 2025-03-21 DIAGNOSIS — E11.9 TYPE 2 DIABETES MELLITUS WITHOUT COMPLICATION, WITHOUT LONG-TERM CURRENT USE OF INSULIN (H): Primary | Chronic | ICD-10-CM

## 2025-03-21 DIAGNOSIS — Z00.00 PREVENTATIVE HEALTH CARE: ICD-10-CM

## 2025-03-21 NOTE — PROGRESS NOTES
Tedyd Shah has an upcoming lab appointment:    Future Appointments   Date Time Provider Department Center   3/28/2025 11:00 AM AN LAB [facility]         There is no order available. Please review and place either future orders or HMPO (Review of Health Maintenance Protocol Orders), as appropriate.    Health Maintenance Due   Topic    ANNUAL REVIEW OF HM ORDERS     HIV SCREENING     HEPATITIS C SCREENING     A1C     LIPID     MICROALBUMIN      Nancy Santos

## 2025-03-31 ENCOUNTER — TELEPHONE (OUTPATIENT)
Dept: FAMILY MEDICINE | Facility: CLINIC | Age: 55
End: 2025-03-31
Payer: COMMERCIAL

## 2025-03-31 DIAGNOSIS — I10 ESSENTIAL HYPERTENSION WITH GOAL BLOOD PRESSURE LESS THAN 140/90: ICD-10-CM

## 2025-03-31 DIAGNOSIS — E11.9 TYPE 2 DIABETES MELLITUS WITHOUT COMPLICATION, WITHOUT LONG-TERM CURRENT USE OF INSULIN (H): ICD-10-CM

## 2025-03-31 RX ORDER — HYDRALAZINE HYDROCHLORIDE 25 MG/1
25 TABLET, FILM COATED ORAL 2 TIMES DAILY
Qty: 180 TABLET | Refills: 1 | OUTPATIENT
Start: 2025-03-31

## 2025-03-31 RX ORDER — METFORMIN HYDROCHLORIDE 500 MG/1
TABLET, EXTENDED RELEASE ORAL
Qty: 90 TABLET | Refills: 0 | OUTPATIENT
Start: 2025-03-31

## 2025-03-31 NOTE — TELEPHONE ENCOUNTER
Medication Question or Refill    Contacts       Contact Date/Time Type Contact Phone/Fax    03/31/2025 10:56 AM CDT Phone (Incoming) Vasyl Shah (Self) [phone] (H)            What medication are you calling about (include dose and sig)?: hydralazine 25MG, Metformin 500 MG     Preferred Pharmacy:   [facility] - [address] [address]  Phone: [phone] Fax: [phone]          Controlled Substance Agreement on file:   CSA -- Patient Level:    CSA: None found at the patient level.       Who prescribed the medication?: Dr.Morgan Vick    Do you need a refill? Yes    When did you use the medication last? 03/31/2025    Patient offered an appointment? No    Do you have any questions or concerns?  No      Could we send this information to you in [facility] or would you prefer to receive a phone call?:   No preference   Okay to leave a detailed message?: Yes at Cell number on file:    Telephone Information:   Mobile [phone]

## 2025-03-31 NOTE — TELEPHONE ENCOUNTER
LM for patient to return call to clinic to schedule a follow up visit to discuss lab results.  May be virtual or in person.  Mel LIZAMA    [facility]

## 2025-03-31 NOTE — TELEPHONE ENCOUNTER
Hasmukh Vick MD  [facility]  Please call patient. Generally normal results but would like an appointment to discuss labs. We can do a video visit if patient able to self-monitor blood pressure.    Patient:  Teddy Shah  [phone] (home) [phone] (work)    Provider:  Hasmukh Vick MD MD  Please call/evisit with questions or concerns.

## 2025-04-01 NOTE — TELEPHONE ENCOUNTER
Patient is scheduled with Dr Vick on 4/18/25.Cynthia Valdes [facility]

## 2025-04-03 DIAGNOSIS — I10 ESSENTIAL HYPERTENSION WITH GOAL BLOOD PRESSURE LESS THAN 140/90: ICD-10-CM

## 2025-04-03 RX ORDER — HYDRALAZINE HYDROCHLORIDE 25 MG/1
25 TABLET, FILM COATED ORAL 2 TIMES DAILY
Qty: 180 TABLET | Refills: 1 | Status: SHIPPED | OUTPATIENT
Start: 2025-04-03

## 2025-05-01 ENCOUNTER — TRANSFERRED RECORDS (OUTPATIENT)
Dept: HEALTH INFORMATION MANAGEMENT | Facility: CLINIC | Age: 55
End: 2025-05-01
Payer: COMMERCIAL

## 2025-05-01 LAB — RETINOPATHY: NEGATIVE

## 2025-05-27 DIAGNOSIS — E11.9 TYPE 2 DIABETES MELLITUS WITHOUT COMPLICATION, WITHOUT LONG-TERM CURRENT USE OF INSULIN (H): ICD-10-CM

## 2025-05-27 RX ORDER — METFORMIN HYDROCHLORIDE 500 MG/1
500 TABLET, EXTENDED RELEASE ORAL
Qty: 90 TABLET | Refills: 0 | Status: SHIPPED | OUTPATIENT
Start: 2025-05-27

## 2025-07-05 ENCOUNTER — HEALTH MAINTENANCE LETTER (OUTPATIENT)
Age: 55
End: 2025-07-05

## 2025-08-26 DIAGNOSIS — I10 ESSENTIAL HYPERTENSION WITH GOAL BLOOD PRESSURE LESS THAN 140/90: ICD-10-CM

## 2025-08-26 DIAGNOSIS — E78.5 DYSLIPIDEMIA: ICD-10-CM

## 2025-08-26 DIAGNOSIS — E11.9 TYPE 2 DIABETES MELLITUS WITHOUT COMPLICATION, WITHOUT LONG-TERM CURRENT USE OF INSULIN (H): ICD-10-CM

## 2025-08-26 RX ORDER — METFORMIN HYDROCHLORIDE 500 MG/1
500 TABLET, EXTENDED RELEASE ORAL
Qty: 90 TABLET | Refills: 0 | Status: SHIPPED | OUTPATIENT
Start: 2025-08-26

## 2025-08-26 RX ORDER — HYDRALAZINE HYDROCHLORIDE 25 MG/1
25 TABLET, FILM COATED ORAL 2 TIMES DAILY
Qty: 180 TABLET | Refills: 0 | Status: SHIPPED | OUTPATIENT
Start: 2025-08-26

## 2025-08-26 RX ORDER — ATORVASTATIN CALCIUM 20 MG/1
20 TABLET, FILM COATED ORAL DAILY
Qty: 90 TABLET | Refills: 3 | OUTPATIENT
Start: 2025-08-26

## (undated) RX ORDER — GLYCOPYRROLATE 0.2 MG/ML
INJECTION, SOLUTION INTRAMUSCULAR; INTRAVENOUS
Status: DISPENSED
Start: 2021-11-29

## (undated) RX ORDER — LIDOCAINE HYDROCHLORIDE 10 MG/ML
INJECTION, SOLUTION EPIDURAL; INFILTRATION; INTRACAUDAL; PERINEURAL
Status: DISPENSED
Start: 2021-11-29

## (undated) RX ORDER — PROPOFOL 10 MG/ML
INJECTION, EMULSION INTRAVENOUS
Status: DISPENSED
Start: 2021-11-29